# Patient Record
Sex: FEMALE | Race: WHITE | Employment: OTHER | URBAN - NONMETROPOLITAN AREA
[De-identification: names, ages, dates, MRNs, and addresses within clinical notes are randomized per-mention and may not be internally consistent; named-entity substitution may affect disease eponyms.]

---

## 2017-01-19 DIAGNOSIS — R07.89 CHEST WALL PAIN: ICD-10-CM

## 2017-01-19 RX ORDER — AMITRIPTYLINE HYDROCHLORIDE 25 MG/1
TABLET, FILM COATED ORAL
Qty: 90 TABLET | Refills: 2 | Status: SHIPPED | OUTPATIENT
Start: 2017-01-19 | End: 2017-04-25 | Stop reason: ALTCHOICE

## 2017-04-14 DIAGNOSIS — E78.00 PURE HYPERCHOLESTEROLEMIA: ICD-10-CM

## 2017-04-14 DIAGNOSIS — I10 HTN, GOAL BELOW 140/90: ICD-10-CM

## 2017-04-17 RX ORDER — VALSARTAN 320 MG/1
TABLET ORAL
Qty: 90 TABLET | Refills: 3 | Status: SHIPPED | OUTPATIENT
Start: 2017-04-17 | End: 2018-04-03 | Stop reason: SDUPTHER

## 2017-04-17 RX ORDER — SIMVASTATIN 40 MG
TABLET ORAL
Qty: 90 TABLET | Refills: 3 | OUTPATIENT
Start: 2017-04-17

## 2017-04-17 RX ORDER — SIMVASTATIN 20 MG
20 TABLET ORAL EVERY EVENING
Qty: 90 TABLET | Refills: 5 | Status: SHIPPED | OUTPATIENT
Start: 2017-04-17 | End: 2018-04-26

## 2017-04-25 ENCOUNTER — OFFICE VISIT (OUTPATIENT)
Dept: FAMILY MEDICINE CLINIC | Age: 72
End: 2017-04-25

## 2017-04-25 VITALS
TEMPERATURE: 98.3 F | RESPIRATION RATE: 14 BRPM | SYSTOLIC BLOOD PRESSURE: 120 MMHG | HEIGHT: 62 IN | DIASTOLIC BLOOD PRESSURE: 64 MMHG | WEIGHT: 198 LBS | BODY MASS INDEX: 36.44 KG/M2 | HEART RATE: 63 BPM

## 2017-04-25 DIAGNOSIS — M70.71 HIP BURSITIS, RIGHT: ICD-10-CM

## 2017-04-25 DIAGNOSIS — R73.03 PREDIABETES: ICD-10-CM

## 2017-04-25 DIAGNOSIS — E66.9 OBESITY (BMI 35.0-39.9 WITHOUT COMORBIDITY): Primary | ICD-10-CM

## 2017-04-25 DIAGNOSIS — I10 HTN, GOAL BELOW 140/90: ICD-10-CM

## 2017-04-25 DIAGNOSIS — R73.9 HYPERGLYCEMIA: ICD-10-CM

## 2017-04-25 LAB — HBA1C MFR BLD: 5.8 %

## 2017-04-25 PROCEDURE — 3017F COLORECTAL CA SCREEN DOC REV: CPT | Performed by: FAMILY MEDICINE

## 2017-04-25 PROCEDURE — 83036 HEMOGLOBIN GLYCOSYLATED A1C: CPT | Performed by: FAMILY MEDICINE

## 2017-04-25 PROCEDURE — 1036F TOBACCO NON-USER: CPT | Performed by: FAMILY MEDICINE

## 2017-04-25 PROCEDURE — G8427 DOCREV CUR MEDS BY ELIG CLIN: HCPCS | Performed by: FAMILY MEDICINE

## 2017-04-25 PROCEDURE — G8417 CALC BMI ABV UP PARAM F/U: HCPCS | Performed by: FAMILY MEDICINE

## 2017-04-25 PROCEDURE — G8399 PT W/DXA RESULTS DOCUMENT: HCPCS | Performed by: FAMILY MEDICINE

## 2017-04-25 PROCEDURE — 3014F SCREEN MAMMO DOC REV: CPT | Performed by: FAMILY MEDICINE

## 2017-04-25 PROCEDURE — 99214 OFFICE O/P EST MOD 30 MIN: CPT | Performed by: FAMILY MEDICINE

## 2017-04-25 PROCEDURE — 1123F ACP DISCUSS/DSCN MKR DOCD: CPT | Performed by: FAMILY MEDICINE

## 2017-04-25 PROCEDURE — 1090F PRES/ABSN URINE INCON ASSESS: CPT | Performed by: FAMILY MEDICINE

## 2017-04-25 PROCEDURE — 4040F PNEUMOC VAC/ADMIN/RCVD: CPT | Performed by: FAMILY MEDICINE

## 2017-04-25 RX ORDER — CHOLECALCIFEROL (VITAMIN D3) 125 MCG
5000 CAPSULE ORAL DAILY
COMMUNITY

## 2017-04-26 ENCOUNTER — TELEPHONE (OUTPATIENT)
Dept: FAMILY MEDICINE CLINIC | Age: 72
End: 2017-04-26

## 2017-04-26 DIAGNOSIS — G89.29 CHRONIC MIDLINE LOW BACK PAIN WITHOUT SCIATICA: ICD-10-CM

## 2017-04-26 DIAGNOSIS — M25.551 CHRONIC HIP PAIN, RIGHT: Primary | ICD-10-CM

## 2017-04-26 DIAGNOSIS — G89.29 CHRONIC HIP PAIN, RIGHT: Primary | ICD-10-CM

## 2017-04-26 DIAGNOSIS — M54.50 CHRONIC MIDLINE LOW BACK PAIN WITHOUT SCIATICA: ICD-10-CM

## 2017-04-27 ENCOUNTER — TELEPHONE (OUTPATIENT)
Dept: FAMILY MEDICINE CLINIC | Age: 72
End: 2017-04-27

## 2017-08-16 ENCOUNTER — NURSE TRIAGE (OUTPATIENT)
Dept: ADMINISTRATIVE | Age: 72
End: 2017-08-16

## 2017-10-22 ENCOUNTER — HOSPITAL ENCOUNTER (EMERGENCY)
Age: 72
Discharge: HOME | End: 2017-10-22
Attending: FAMILY MEDICINE
Payer: MEDICARE

## 2017-10-22 ENCOUNTER — APPOINTMENT (OUTPATIENT)
Dept: GENERAL RADIOLOGY | Age: 72
End: 2017-10-22
Payer: MEDICARE

## 2017-10-22 VITALS
HEART RATE: 68 BPM | HEIGHT: 62 IN | TEMPERATURE: 98.3 F | DIASTOLIC BLOOD PRESSURE: 57 MMHG | OXYGEN SATURATION: 96 % | RESPIRATION RATE: 18 BRPM | BODY MASS INDEX: 34.04 KG/M2 | SYSTOLIC BLOOD PRESSURE: 146 MMHG | WEIGHT: 185 LBS

## 2017-10-22 DIAGNOSIS — R07.9 CHEST PAIN, UNSPECIFIED TYPE: Primary | ICD-10-CM

## 2017-10-22 LAB
ANION GAP SERPL CALCULATED.3IONS-SCNC: 14 MEQ/L (ref 8–16)
APTT: 32.6 SECONDS (ref 22–38)
BASOPHILS # BLD: 0.7 %
BASOPHILS ABSOLUTE: 0 THOU/MM3 (ref 0–0.1)
BUN BLDV-MCNC: 13 MG/DL (ref 7–22)
CALCIUM SERPL-MCNC: 9.3 MG/DL (ref 8.5–10.5)
CHLORIDE BLD-SCNC: 101 MEQ/L (ref 98–111)
CO2: 25 MEQ/L (ref 23–33)
CREAT SERPL-MCNC: 0.4 MG/DL (ref 0.4–1.2)
D-DIMER QUANTITATIVE: 416 NG/ML FEU (ref 0–500)
EKG ATRIAL RATE: 72 BPM
EKG P AXIS: 66 DEGREES
EKG P-R INTERVAL: 206 MS
EKG Q-T INTERVAL: 410 MS
EKG QRS DURATION: 134 MS
EKG QTC CALCULATION (BAZETT): 448 MS
EKG R AXIS: 15 DEGREES
EKG T AXIS: 108 DEGREES
EKG VENTRICULAR RATE: 72 BPM
EOSINOPHIL # BLD: 3.4 %
EOSINOPHILS ABSOLUTE: 0.2 THOU/MM3 (ref 0–0.4)
GFR SERPL CREATININE-BSD FRML MDRD: > 90 ML/MIN/1.73M2
GLUCOSE BLD-MCNC: 103 MG/DL (ref 70–108)
HCT VFR BLD CALC: 41.1 % (ref 37–47)
HEMOGLOBIN: 13.8 GM/DL (ref 12–16)
INR BLD: 0.97 (ref 0.85–1.13)
LYMPHOCYTES # BLD: 35.3 %
LYMPHOCYTES ABSOLUTE: 1.8 THOU/MM3 (ref 1–4.8)
MCH RBC QN AUTO: 29.2 PG (ref 27–31)
MCHC RBC AUTO-ENTMCNC: 33.6 GM/DL (ref 33–37)
MCV RBC AUTO: 86.9 FL (ref 81–99)
MONOCYTES # BLD: 9.2 %
MONOCYTES ABSOLUTE: 0.5 THOU/MM3 (ref 0.4–1.3)
NUCLEATED RED BLOOD CELLS: 0 /100 WBC
OSMOLALITY CALCULATION: 279.8 MOSMOL/KG (ref 275–300)
PDW BLD-RTO: 14 % (ref 11.5–14.5)
PLATELET # BLD: 145 THOU/MM3 (ref 130–400)
PMV BLD AUTO: 9 MCM (ref 7.4–10.4)
POTASSIUM SERPL-SCNC: 3.6 MEQ/L (ref 3.5–5.2)
RBC # BLD: 4.73 MILL/MM3 (ref 4.2–5.4)
RBC # BLD: NORMAL 10*6/UL
SEG NEUTROPHILS: 51.4 %
SEGMENTED NEUTROPHILS ABSOLUTE COUNT: 2.6 THOU/MM3 (ref 1.8–7.7)
SODIUM BLD-SCNC: 140 MEQ/L (ref 135–145)
TROPONIN T: < 0.01 NG/ML
WBC # BLD: 5.1 THOU/MM3 (ref 4.8–10.8)

## 2017-10-22 PROCEDURE — 85610 PROTHROMBIN TIME: CPT

## 2017-10-22 PROCEDURE — 71101 X-RAY EXAM UNILAT RIBS/CHEST: CPT

## 2017-10-22 PROCEDURE — 99285 EMERGENCY DEPT VISIT HI MDM: CPT

## 2017-10-22 PROCEDURE — 85379 FIBRIN DEGRADATION QUANT: CPT

## 2017-10-22 PROCEDURE — 85025 COMPLETE CBC W/AUTO DIFF WBC: CPT

## 2017-10-22 PROCEDURE — 93005 ELECTROCARDIOGRAM TRACING: CPT

## 2017-10-22 PROCEDURE — 85730 THROMBOPLASTIN TIME PARTIAL: CPT

## 2017-10-22 PROCEDURE — 80048 BASIC METABOLIC PNL TOTAL CA: CPT

## 2017-10-22 PROCEDURE — 84484 ASSAY OF TROPONIN QUANT: CPT

## 2017-10-22 PROCEDURE — 36415 COLL VENOUS BLD VENIPUNCTURE: CPT

## 2017-10-22 PROCEDURE — 2580000003 HC RX 258: Performed by: FAMILY MEDICINE

## 2017-10-22 RX ORDER — SODIUM CHLORIDE 9 MG/ML
INJECTION, SOLUTION INTRAVENOUS CONTINUOUS
Status: DISCONTINUED | OUTPATIENT
Start: 2017-10-22 | End: 2017-10-22 | Stop reason: HOSPADM

## 2017-10-22 RX ADMIN — SODIUM CHLORIDE: 9 INJECTION, SOLUTION INTRAVENOUS at 05:21

## 2017-10-22 ASSESSMENT — PAIN SCALES - GENERAL
PAINLEVEL_OUTOF10: 5
PAINLEVEL_OUTOF10: 5

## 2017-10-22 ASSESSMENT — ENCOUNTER SYMPTOMS
COUGH: 0
WHEEZING: 0
CHEST TIGHTNESS: 0
ABDOMINAL PAIN: 0

## 2017-10-22 ASSESSMENT — PAIN DESCRIPTION - PAIN TYPE: TYPE: ACUTE PAIN

## 2017-10-22 ASSESSMENT — PAIN DESCRIPTION - DESCRIPTORS: DESCRIPTORS: ACHING

## 2017-10-22 ASSESSMENT — PAIN DESCRIPTION - LOCATION: LOCATION: CHEST

## 2017-10-22 ASSESSMENT — PAIN DESCRIPTION - FREQUENCY: FREQUENCY: CONTINUOUS

## 2017-10-22 ASSESSMENT — PAIN DESCRIPTION - ORIENTATION: ORIENTATION: LEFT

## 2017-10-22 NOTE — ED TRIAGE NOTES
Patient presents to ER room 3 with report of left sided chest pain. States that the pain has been present intermittently since 1930 last night but states the pain significantly increased after waking up for the restroom early this morning. Patient denies any shortness of breath or diaphoresis. States she was initially concerned for indigestion but now is not so sure. Respirations even and unlabored at this time. Patient states that she is a patient of Dr Cindy Carlisle and believes she had a echo performed in April.

## 2017-10-22 NOTE — ED PROVIDER NOTES
indicated that the status of her paternal uncle is unknown. She indicated that the status of her neg hx is unknown.    family history includes Cancer in her sister; Heart Disease in her father; High Blood Pressure in her paternal uncle; Other in her mother; Stroke in her maternal grandmother and mother. SOCIAL HISTORY      reports that she has never smoked. She has never used smokeless tobacco. She reports that she does not drink alcohol or use drugs. PHYSICAL EXAM     INITIAL VITALS:  height is 5' 2\" (1.575 m) and weight is 185 lb (83.9 kg). Her oral temperature is 98.3 °F (36.8 °C). Her blood pressure is 146/57 (abnormal) and her pulse is 68. Her respiration is 18 and oxygen saturation is 96%. Physical Exam   Constitutional: She is oriented to person, place, and time. She appears well-developed and well-nourished. GCS 15   HENT:   Head: Normocephalic and atraumatic. Eyes: Conjunctivae are normal. Pupils are equal, round, and reactive to light. No scleral icterus. Neck: Normal range of motion. Neck supple. No JVD present. No tracheal deviation present. No thyromegaly present. Cardiovascular: Normal rate, regular rhythm and intact distal pulses. Pulmonary/Chest: Effort normal and breath sounds normal. She has no wheezes. She exhibits no tenderness. Abdominal: Soft. Bowel sounds are normal. There is no tenderness. There is no rebound and no guarding. Musculoskeletal: Normal range of motion. She exhibits no edema or deformity. Lymphadenopathy:     She has no cervical adenopathy. Neurological: She is alert and oriented to person, place, and time. She exhibits normal muscle tone. Skin: Skin is warm and dry. No erythema. Psychiatric: She has a normal mood and affect. Her behavior is normal.   Nursing note and vitals reviewed. DIFFERENTIAL DIAGNOSIS:   Chest pain, nonexertional    Evaluate for cardiac cause, consider PE    Possible musculoskeletal    DIAGNOSTIC RESULTS     EKG:  All EKG's are interpreted by the Emergency Department Physician who either signs or Co-signs this chart in the absence of a cardiologist.  EKG interpreted by Tano Schuler MD:    EKG showed Sinus rhythm with rate 72. QRS complexes show Normal axis, 134 ms conduction. ST-T waves show Changes secondary to left bundle branch block    Compared to old EKG, no change. RADIOLOGY: non-plain film images(s) such as CT, Ultrasound and MRI are read by the radiologist.  The patient had a 5 view X-ray of the Chest and left ribs which demonstrates Normal heart and mediastinum. Lungs were clear with no pneumonia. Visualized ribs appear intact with no fracture seen per my interpretation    [x] Visualized and interpreted by me   [] Radiologist's Report Reviewed   [] Discussed with Radiologist.        LABS:   Labs Reviewed   D-DIMER, QUANTITATIVE   CBC WITH AUTO DIFFERENTIAL   PROTIME-INR   APTT   BASIC METABOLIC PANEL   TROPONIN   ANION GAP   GLOMERULAR FILTRATION RATE, ESTIMATED   OSMOLALITY       EMERGENCY DEPARTMENT COURSE:   Vitals:    Vitals:    10/22/17 0459 10/22/17 0534   BP: (!) 169/63 (!) 146/57   Pulse: 78 68   Resp: 16 18   Temp: 98.3 °F (36.8 °C)    TempSrc: Oral    SpO2: 94% 96%   Weight: 185 lb (83.9 kg)    Height: 5' 2\" (1.575 m)      5:25 AM: The patient was seen and evaluated. Nursing notes reviewed    D-dimer normal going against PE    EKG unchanged    Troponin normal    Heart cath 2/11/16 showed patent coronary arteriesSo unlikely to have progressed in the short time interval    We'll treat for chest wall pain    Discharge home         CRITICAL CARE:   none     CONSULTS:  none    PROCEDURES:  None    FINAL IMPRESSION      1.  Chest pain, unspecified type          DISPOSITION/PLAN     Discharge home    PATIENT REFERRED TO:  DO Tiffanie Gambino [id number]  [phone]    In 1 week        DISCHARGE MEDICATIONS:  New Prescriptions    No medications on file       (Please note that

## 2017-10-24 ENCOUNTER — CARE COORDINATION (OUTPATIENT)
Dept: FAMILY MEDICINE CLINIC | Age: 72
End: 2017-10-24

## 2017-10-24 NOTE — CARE COORDINATION
Ambulatory Care Coordination ED Follow up Call       Reason for ED Visit:  Chest Pain  Care Management Risk Score: CMRS 0  How are you feeling? :     improved  Patient Reports the following:  none             Contact RNCC regarding any worsening symptoms from above. Did you call your PCP prior to going to the ED? No          Post Discharge Status:  What health concerns since you left the Emergency Room?  none    Do you have wounds that you are caring for at home? No    Do you have a follow up appt scheduled? yes    Review of Instructions:                                 Do you have any questions regarding your discharge instructions?:  No  Medications:    What questions do you have about your medications? N/A  Are you taking your medications as directed? If not - why? Yes   Can you afford your medications? yes  ADLS:  Do you need assistance of any kind at home? No   What assistance is needed? N/a    I spoke with the patient re: ed visit. Patient was seen and treated for chest pain. Patients denies current chest pain or palpations. Admits to shortness of breath when getting out of bed in the mornings. No dizziness or lightheadedness. Instructed patient on importance of early symptom recognition to prevent hospitalization and ED visits. No pain. Follow up with PCP has been scheduled. I offered an appointment with CNP, but patient requested to see PCP only. I advised patient to contact PCP office if needed. No further needs at this time.             FU appts/Provider:    Future Appointments  Date Time Provider Lesley Hdz   11/7/2017 11:00 AM Prasad Ramos, [address] [place] [address]   12/4/2017 1:00 PM Cecilia Collado MD [facility]   4/26/2018 11:00 AM [facility] Due   Topic Date Due    Hepatitis C screen  1945    DTaP/Tdap/Td vaccine (1 - Tdap) 06/24/1966    Pneumococcal low/med risk (2 of 2 - PPSV23) 08/03/2016    Flu vaccine (1) 09/01/2017    Breast cancer screen  11/02/2017     Patient advised to contact PCP office to have HM items/records faxed to PCP Office directly?   yes

## 2017-11-07 ENCOUNTER — OFFICE VISIT (OUTPATIENT)
Dept: FAMILY MEDICINE CLINIC | Age: 72
End: 2017-11-07
Payer: MEDICARE

## 2017-11-07 VITALS
BODY MASS INDEX: 35.5 KG/M2 | DIASTOLIC BLOOD PRESSURE: 62 MMHG | HEART RATE: 67 BPM | WEIGHT: 188 LBS | SYSTOLIC BLOOD PRESSURE: 142 MMHG | TEMPERATURE: 98.1 F | RESPIRATION RATE: 14 BRPM | HEIGHT: 61 IN
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
## 2017-11-07 DIAGNOSIS — M25.552 LEFT HIP PAIN: Primary | ICD-10-CM

## 2017-11-07 DIAGNOSIS — Z91.81 AT HIGH RISK FOR FALLS: ICD-10-CM

## 2017-11-07 DIAGNOSIS — M70.71 BURSITIS OF RIGHT HIP, UNSPECIFIED BURSA: ICD-10-CM

## 2017-11-07 DIAGNOSIS — R07.89 CHEST WALL PAIN: ICD-10-CM

## 2017-11-07 PROCEDURE — G8484 FLU IMMUNIZE NO ADMIN: HCPCS | Performed by: FAMILY MEDICINE

## 2017-11-07 PROCEDURE — 3014F SCREEN MAMMO DOC REV: CPT | Performed by: FAMILY MEDICINE

## 2017-11-07 PROCEDURE — 4040F PNEUMOC VAC/ADMIN/RCVD: CPT | Performed by: FAMILY MEDICINE

## 2017-11-07 PROCEDURE — 1123F ACP DISCUSS/DSCN MKR DOCD: CPT | Performed by: FAMILY MEDICINE

## 2017-11-07 PROCEDURE — 1036F TOBACCO NON-USER: CPT | Performed by: FAMILY MEDICINE

## 2017-11-07 PROCEDURE — G8427 DOCREV CUR MEDS BY ELIG CLIN: HCPCS | Performed by: FAMILY MEDICINE

## 2017-11-07 PROCEDURE — 3017F COLORECTAL CA SCREEN DOC REV: CPT | Performed by: FAMILY MEDICINE

## 2017-11-07 PROCEDURE — G8417 CALC BMI ABV UP PARAM F/U: HCPCS | Performed by: FAMILY MEDICINE

## 2017-11-07 PROCEDURE — G8399 PT W/DXA RESULTS DOCUMENT: HCPCS | Performed by: FAMILY MEDICINE

## 2017-11-07 PROCEDURE — 1090F PRES/ABSN URINE INCON ASSESS: CPT | Performed by: FAMILY MEDICINE

## 2017-11-07 PROCEDURE — 99214 OFFICE O/P EST MOD 30 MIN: CPT | Performed by: FAMILY MEDICINE

## 2017-11-07 RX ORDER — OMEPRAZOLE 20 MG/1
40 CAPSULE, DELAYED RELEASE ORAL DAILY
COMMUNITY
End: 2018-05-03

## 2017-11-07 RX ORDER — PREDNISONE 20 MG/1
40 TABLET ORAL EVERY MORNING
Qty: 10 TABLET | Refills: 0 | Status: SHIPPED | OUTPATIENT
Start: 2017-11-07 | End: 2017-11-12

## 2017-11-07 ASSESSMENT — PATIENT HEALTH QUESTIONNAIRE - PHQ9
SUM OF ALL RESPONSES TO PHQ QUESTIONS 1-9: 0
1. LITTLE INTEREST OR PLEASURE IN DOING THINGS: 0
SUM OF ALL RESPONSES TO PHQ9 QUESTIONS 1 & 2: 0
2. FEELING DOWN, DEPRESSED OR HOPELESS: 0

## 2017-11-07 ASSESSMENT — ENCOUNTER SYMPTOMS
WHEEZING: 0
COUGH: 0
SHORTNESS OF BREATH: 0

## 2017-11-07 NOTE — PROGRESS NOTES
Have you changed or started any medications since your last visit including any over-the-counter medicines, vitamins, or herbal medicines? no   Are you having any side effects from any of your medications? -  no  Have you stopped taking any of your medications? Is so, why? -  no    Have you seen any other physician or provider since your last visit? Yes - Records Obtained  Have you had any other diagnostic tests since your last visit? Yes - Records Obtained  Have you been seen in the emergency room and/or had an admission to a hospital since we last saw you? Yes - Records Obtained  Have you had your routine dental cleaning in the past 6 months? no    Have you activated your [employer] account? If not, what are your barriers? Yes     Patient Care Team:  Lamar Hernández DO as PCP - [address] [place] [address] DO as PCP - [facility] Attributed Provider  Enrique Weiss CNP as Nurse Practitioner (Nurse Practitioner)  Nickie Villalpando MD as Consulting Physician (Pulmonology)    Medical History Review  Past Medical, Family, and Social History     Defer to provider.

## 2017-11-07 NOTE — LETTER
[address]  [address] [address]  Phone: [phone]  Fax: [phone]    Raymond Sims DO        November 7, 2017     Patient: Jenifer Landry   YOB: 1945   Date of Visit: 11/7/2017       To Whom It May Concern: It is my medical opinion that Kirke Pica should have a goal weight of 150lbs. If you have any questions or concerns, please don't hesitate to call.     Sincerely,        Raymond Sims DO

## 2017-11-08 ENCOUNTER — HOSPITAL ENCOUNTER (OUTPATIENT)
Dept: GENERAL RADIOLOGY | Age: 72
Discharge: HOME | End: 2017-11-08
Payer: MEDICARE

## 2017-11-08 ENCOUNTER — HOSPITAL ENCOUNTER (OUTPATIENT)
Age: 72
Discharge: HOME | End: 2017-11-08
Payer: MEDICARE

## 2017-11-08 ENCOUNTER — TELEPHONE (OUTPATIENT)
Dept: FAMILY MEDICINE CLINIC | Age: 72
End: 2017-11-08

## 2017-11-08 DIAGNOSIS — M25.552 LEFT HIP PAIN: ICD-10-CM

## 2017-11-08 PROCEDURE — 73502 X-RAY EXAM HIP UNI 2-3 VIEWS: CPT

## 2017-11-08 NOTE — TELEPHONE ENCOUNTER
----- Message from Marion Porter, DO sent at 11/8/2017 12:02 PM EST -----  XRay with arthritis changes, but otherwise looked ok.   I would like to see how she does on the prednisone

## 2017-11-09 ENCOUNTER — HOSPITAL ENCOUNTER (OUTPATIENT)
Dept: CT IMAGING | Age: 72
Discharge: HOME | End: 2017-11-09
Payer: MEDICARE

## 2017-11-09 DIAGNOSIS — R10.84 GENERALIZED ABDOMINAL PAIN: ICD-10-CM

## 2017-11-09 PROCEDURE — 74177 CT ABD & PELVIS W/CONTRAST: CPT

## 2017-11-09 PROCEDURE — 6360000004 HC RX CONTRAST MEDICATION: Performed by: NURSE PRACTITIONER

## 2017-11-09 RX ADMIN — IOHEXOL 50 ML: 240 INJECTION, SOLUTION INTRATHECAL; INTRAVASCULAR; INTRAVENOUS; ORAL at 13:30

## 2017-11-09 RX ADMIN — IOPAMIDOL 85 ML: 755 INJECTION, SOLUTION INTRAVENOUS at 13:30

## 2017-11-13 ENCOUNTER — TELEPHONE (OUTPATIENT)
Dept: FAMILY MEDICINE CLINIC | Age: 72
End: 2017-11-13

## 2017-11-13 DIAGNOSIS — E27.8 LEFT ADRENAL MASS (HCC): Primary | ICD-10-CM

## 2017-11-29 ENCOUNTER — HOSPITAL ENCOUNTER (OUTPATIENT)
Dept: MRI IMAGING | Age: 72
Discharge: HOME | End: 2017-11-29
Payer: MEDICARE

## 2017-11-29 ENCOUNTER — TELEPHONE (OUTPATIENT)
Dept: FAMILY MEDICINE CLINIC | Age: 72
End: 2017-11-29

## 2017-11-29 DIAGNOSIS — E27.8 LEFT ADRENAL MASS (HCC): ICD-10-CM

## 2017-11-29 PROCEDURE — 74181 MRI ABDOMEN W/O CONTRAST: CPT

## 2017-12-04 ENCOUNTER — OFFICE VISIT (OUTPATIENT)
Dept: SURGERY | Age: 72
End: 2017-12-04
Payer: MEDICARE

## 2017-12-04 VITALS
WEIGHT: 192.4 LBS | OXYGEN SATURATION: 93 % | TEMPERATURE: 96.8 F | RESPIRATION RATE: 16 BRPM | HEIGHT: 62 IN | DIASTOLIC BLOOD PRESSURE: 80 MMHG | SYSTOLIC BLOOD PRESSURE: 120 MMHG | BODY MASS INDEX: 35.41 KG/M2 | HEART RATE: 90 BPM

## 2017-12-04 DIAGNOSIS — Z12.39 SCREENING BREAST EXAMINATION: Primary | ICD-10-CM

## 2017-12-04 DIAGNOSIS — D05.12 DUCTAL CARCINOMA IN SITU (DCIS) OF LEFT BREAST: ICD-10-CM

## 2017-12-04 PROCEDURE — G8417 CALC BMI ABV UP PARAM F/U: HCPCS | Performed by: SURGERY

## 2017-12-04 PROCEDURE — G8484 FLU IMMUNIZE NO ADMIN: HCPCS | Performed by: SURGERY

## 2017-12-04 PROCEDURE — G8427 DOCREV CUR MEDS BY ELIG CLIN: HCPCS | Performed by: SURGERY

## 2017-12-04 PROCEDURE — 1123F ACP DISCUSS/DSCN MKR DOCD: CPT | Performed by: SURGERY

## 2017-12-04 PROCEDURE — G8399 PT W/DXA RESULTS DOCUMENT: HCPCS | Performed by: SURGERY

## 2017-12-04 PROCEDURE — 1036F TOBACCO NON-USER: CPT | Performed by: SURGERY

## 2017-12-04 PROCEDURE — 1090F PRES/ABSN URINE INCON ASSESS: CPT | Performed by: SURGERY

## 2017-12-04 PROCEDURE — 3014F SCREEN MAMMO DOC REV: CPT | Performed by: SURGERY

## 2017-12-04 PROCEDURE — 3017F COLORECTAL CA SCREEN DOC REV: CPT | Performed by: SURGERY

## 2017-12-04 PROCEDURE — 99213 OFFICE O/P EST LOW 20 MIN: CPT | Performed by: SURGERY

## 2017-12-04 PROCEDURE — 4040F PNEUMOC VAC/ADMIN/RCVD: CPT | Performed by: SURGERY

## 2017-12-04 NOTE — PROGRESS NOTES
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 Radha Delarosa Left 12/18/14    Left Simple Mastectomy with [place] Lymph Node Biopsy - Dr. Lamb [facility]  46/28/0621    incarcertated herina repair    TUBAL LIGATION      UPPER GASTROINTESTINAL ENDOSCOPY      URETHRA SURGERY      stretched       Medications  Current Outpatient Prescriptions   Medication Sig Dispense Refill    omeprazole (PRILOSEC) 20 MG delayed release capsule Take 40 mg by mouth daily      vitamin B-12 (CYANOCOBALAMIN) 500 MCG tablet Take 5,000 mcg by mouth daily       valsartan (DIOVAN) 320 MG tablet TAKE 1 TABLET DAILY 90 tablet 3    simvastatin (ZOCOR) 20 MG tablet Take 1 tablet by mouth every evening 90 tablet 5    famotidine (PEPCID) 20 MG tablet Take 20 mg by mouth 2 times daily      Cholecalciferol (VITAMIN D3) 5000 UNITS TABS Take 1 tablet by mouth      oxybutynin (DITROPAN) 5 MG tablet Take 1 tablet by mouth daily 90 tablet 3    Coenzyme Q10 (CO Q 10) 100 MG CAPS Take 200 mg by mouth daily      amLODIPine (NORVASC) 5 MG tablet Take 5 mg by mouth daily      metoprolol (TOPROL-XL) 25 MG XL tablet Take 25 mg by mouth daily      Mag Oxide-Vit D3-Turmeric (MAGNESIUM-VITAMIN D3-TURMERIC) [phone] MG-UNIT-MG CAPS Take 1 tablet by mouth daily      Lactobacillus (ULTIMATE PROBIOTIC FORMULA PO) Take by mouth daily      aspirin 81 MG tablet Take 81 mg by mouth daily      Multiple Vitamins-Minerals (MULTIVITAMIN PO) Take by mouth daily       Flaxseed, Linseed, (FLAXSEED OIL PO) Take by mouth daily       TURMERIC PO Take 500 mg by mouth 2 times daily       OLIVE LEAF EXTRACT PO Take 450 mg by mouth daily       GLUCOSAMINE-CHONDROITIN PO Take by mouth daily       Calcium Citrate-Vitamin D (CALCIUM CITRATE + D PO) Take  by mouth daily. No current facility-administered medications for this visit. Allergies  Allergies   Allergen Reactions    Acrylic Polymer [Carbomer] Hives    Coal Tar Extract Itching    Gabapentin      \"mouth thick and swollen\"    Lipitor [Atorvastatin] Other (See Comments)     Myalgia      Other Other (See Comments)     novacaine - headaches    Tape [Adhesive Tape] Rash       Family History  Family History   Problem Relation Age of Onset    Stroke Mother     Other Mother      dementia    Heart Disease Father     Cancer Sister      skin    High Blood Pressure Paternal Uncle     Stroke Maternal Grandmother     Diabetes Neg Hx        Social History  Social History     Social History    Marital status:      Spouse name: N/A    Number of children: 3    Years of education: N/A     Occupational History    Not on file. Social History Main Topics    Smoking status: Never Smoker    Smokeless tobacco: Never Used    Alcohol use No    Drug use: No    Sexual activity: Not on file     Other Topics Concern    Not on file     Social History Narrative    No narrative on file           Review of Systems  Review of Systems   Constitutional: Negative for activity change, appetite change, fatigue and unexpected weight change. HENT: Positive for sinus pressure. Negative for sore throat and trouble swallowing. Eyes: Positive for itching. Negative for pain. Respiratory: Positive for cough, chest tightness and shortness of breath. Gastrointestinal: Negative for abdominal distention, abdominal pain, blood in stool, constipation and nausea. Endocrine: Negative for polydipsia and polyuria.    Genitourinary: Negative for #1 and #2 - a single lymph node bisected, #3, 4,  and 5 - tissue representing a single lymph node trisected.  ns    B - The container is labeled Marge Lee, left breast.  Received  fresh is a simple mastectomy specimen. The specimen measures 20 x 18 x  5.5 cm.   The specimen surface is tan.  There are no discrete lesions. The nipple is grossly unremarkable. Carl Sarpy is a suture designating  lateral border.  The deep margin is inked blue.  Sections through the  specimen reveal a mainly yellow fatty cut surface.  In the upper outer  quadrant is a well circumscribed nodule with central hemorrhage.  This  nodule measures grossly 1.1 x 0.7 x 1 cm.  The nodule is 1.2 cm from  the deep margin.  There is a metallic biopsy clip within the nodule. In the lower outer quadrant is a second biopsy site measuring 1.6 x 0.7  x 1.2 cm.  This area is about 2 cm from the deep margin and 2.5 cm from  the upper outer quadrant lesion.  No additional biopsy sites or lesions  are grossly identified.  Representative sections are submitted. Cassette #1 - nipple, #2 - deep margin of the upper outer quadrant  lesion, #3 and #4 - upper outer quadrant lesion, #5 - deep margin of  the lower outer quadrant biopsy site, #6 and #7 - lower outer quadrant  biopsy site, #8 - tissue between the upper outer lesion and lower outer  lesion, #9 - upper inner quadrant, #10 - lower inner quadrant. Ss  AMG/DKR:cac    Fixative:  10% neutral buffered formalin  Tissue removal time: 1604  Tissue fixation time: 1644  Total fixation time: 27 hours and 16 minutes    Microscopic Examination:  A.  Multiple levels of the sentinel lymph nodes (2) are examined.  No  malignancy is identified.     B.   Procedure  Total mastectomy (including nipple and skin)    Lymph Node Sampling  Collyer lymph node(s)    Specimen Laterality  Left    Tumor Site  Upper outer quadrant  Lower outer quadrant    Size (Extent) of DCIS  Site #1 - Upper Outer Quadrant:  Estimated size (choose a category based on lymph nodes  received with the specimen; immunohistochemistry and/or molecular  studies are not required)    Modifier  (sn) Only sentinel node(s) evaluated.  If 6 or more sentinel nodes  and/or nonsentinel nodes are removed, this modifier should not be used. Category (pN)  pN0: No regional lymph node metastasis identified histologically      Distant Metastasis (M)  Not applicable    Additional Pathologic Findings  Specify:  Previous biopsy sites are identified. Ancillary Studies  Performed at [place] [address] ([id number], 11/21/14):   Estrogen Receptor:  Positive (98%)   Progesterone Receptor:  Negative   HER-2/shakira:  Positive (score 3+)   Ki-67:  Borderline (11%)    Comment: This patient's previous biopsy (14-IR-11 from [place] [address]) demonstrated three areas in the left breast noted by a U clip  and a bowtie clip.  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 *This test was developed and its performance characteristics determined  by [address] [place] [address] has not been cleared or  approved by the U.S. Food and Drug Administration. Pursuant to the  requirements of CLIA, this laboratory has established and verified the  test's accuracy and precision.  Additional information about this type  of test is available upon request.      EKM:jcs  [id number]  [id number]  [id number]                                                  <Sign Out Dr. Gilbert Vora M.D., F.C.A.P.       [facility]/ [address] [place] [place] [address]  Printed on:  12/23/2014  [address] [place] [address], \[facility] [place] [facility]\""  Original print date: 12/23/2014   Lab and Collection

## 2017-12-05 ASSESSMENT — ENCOUNTER SYMPTOMS
SORE THROAT: 0
CHEST TIGHTNESS: 1
TROUBLE SWALLOWING: 0
CONSTIPATION: 0
NAUSEA: 0
BLOOD IN STOOL: 0
EYE PAIN: 0
COUGH: 1
ABDOMINAL PAIN: 0
ABDOMINAL DISTENTION: 0
SINUS PRESSURE: 1
SHORTNESS OF BREATH: 1
EYE ITCHING: 1

## 2017-12-07 ENCOUNTER — HOSPITAL ENCOUNTER (OUTPATIENT)
Dept: WOMENS IMAGING | Age: 72
Discharge: HOME | End: 2017-12-07
Payer: MEDICARE

## 2017-12-07 DIAGNOSIS — Z12.39 SCREENING BREAST EXAMINATION: ICD-10-CM

## 2017-12-07 DIAGNOSIS — Z00.6 ENCOUNTER FOR EXAMINATION FOR NORMAL COMPARISON OR CONTROL IN CLINICAL RESEARCH PROGRAM: ICD-10-CM

## 2017-12-07 PROCEDURE — 77063 BREAST TOMOSYNTHESIS BI: CPT

## 2017-12-07 PROCEDURE — 3209999900 MAM COMPARISON OF OUTSIDE IMAGES

## 2017-12-09 ENCOUNTER — HOSPITAL ENCOUNTER (OUTPATIENT)
Dept: MRI IMAGING | Age: 72
Discharge: HOME | End: 2017-12-09
Payer: MEDICARE

## 2017-12-09 DIAGNOSIS — Z00.6 EXAMINATION FOR NORMAL COMPARISON FOR CLINICAL RESEARCH: ICD-10-CM

## 2017-12-09 PROCEDURE — 3209999900 MRI COMPARISON OF OUTSIDE FILMS

## 2018-04-03 DIAGNOSIS — I10 HTN, GOAL BELOW 140/90: ICD-10-CM

## 2018-04-03 RX ORDER — VALSARTAN 320 MG/1
TABLET ORAL
Qty: 90 TABLET | Refills: 3 | Status: SHIPPED | OUTPATIENT
Start: 2018-04-03 | End: 2018-07-23

## 2018-04-24 ENCOUNTER — HOSPITAL ENCOUNTER (OUTPATIENT)
Age: 73
Discharge: HOME | End: 2018-04-24
Payer: MEDICARE

## 2018-04-24 ENCOUNTER — HOSPITAL ENCOUNTER (OUTPATIENT)
Dept: PULMONOLOGY | Age: 73
Discharge: HOME | End: 2018-04-24
Payer: MEDICARE

## 2018-04-24 ENCOUNTER — HOSPITAL ENCOUNTER (OUTPATIENT)
Dept: GENERAL RADIOLOGY | Age: 73
Discharge: HOME | End: 2018-04-24
Payer: MEDICARE

## 2018-04-24 DIAGNOSIS — R06.02 SOB (SHORTNESS OF BREATH): ICD-10-CM

## 2018-04-24 PROCEDURE — 94726 PLETHYSMOGRAPHY LUNG VOLUMES: CPT

## 2018-04-24 PROCEDURE — 71046 X-RAY EXAM CHEST 2 VIEWS: CPT

## 2018-04-24 PROCEDURE — 94060 EVALUATION OF WHEEZING: CPT

## 2018-04-24 PROCEDURE — 94729 DIFFUSING CAPACITY: CPT

## 2018-04-26 ENCOUNTER — OFFICE VISIT (OUTPATIENT)
Dept: FAMILY MEDICINE CLINIC | Age: 73
End: 2018-04-26
Payer: MEDICARE

## 2018-04-26 DIAGNOSIS — I25.10 CORONARY ARTERY DISEASE INVOLVING NATIVE CORONARY ARTERY OF NATIVE HEART WITHOUT ANGINA PECTORIS: ICD-10-CM

## 2018-04-26 DIAGNOSIS — I10 HTN, GOAL BELOW 140/90: ICD-10-CM

## 2018-04-26 DIAGNOSIS — E78.00 PURE HYPERCHOLESTEROLEMIA: Primary | ICD-10-CM

## 2018-04-26 DIAGNOSIS — R39.15 URGENCY OF URINATION: ICD-10-CM

## 2018-04-26 PROCEDURE — 4040F PNEUMOC VAC/ADMIN/RCVD: CPT | Performed by: FAMILY MEDICINE

## 2018-04-26 PROCEDURE — 1123F ACP DISCUSS/DSCN MKR DOCD: CPT | Performed by: FAMILY MEDICINE

## 2018-04-26 PROCEDURE — 1036F TOBACCO NON-USER: CPT | Performed by: FAMILY MEDICINE

## 2018-04-26 PROCEDURE — G8427 DOCREV CUR MEDS BY ELIG CLIN: HCPCS | Performed by: FAMILY MEDICINE

## 2018-04-26 PROCEDURE — 99214 OFFICE O/P EST MOD 30 MIN: CPT | Performed by: FAMILY MEDICINE

## 2018-04-26 PROCEDURE — 3017F COLORECTAL CA SCREEN DOC REV: CPT | Performed by: FAMILY MEDICINE

## 2018-04-26 PROCEDURE — G8399 PT W/DXA RESULTS DOCUMENT: HCPCS | Performed by: FAMILY MEDICINE

## 2018-04-26 PROCEDURE — 1090F PRES/ABSN URINE INCON ASSESS: CPT | Performed by: FAMILY MEDICINE

## 2018-04-26 PROCEDURE — G8417 CALC BMI ABV UP PARAM F/U: HCPCS | Performed by: FAMILY MEDICINE

## 2018-04-26 PROCEDURE — G8598 ASA/ANTIPLAT THER USED: HCPCS | Performed by: FAMILY MEDICINE

## 2018-04-26 RX ORDER — ROSUVASTATIN CALCIUM 10 MG/1
10 TABLET, COATED ORAL NIGHTLY
Qty: 30 TABLET | Refills: 3 | COMMUNITY
Start: 2018-04-26 | End: 2018-07-20 | Stop reason: SINTOL

## 2018-04-26 RX ORDER — METOPROLOL SUCCINATE 25 MG/1
25 TABLET, EXTENDED RELEASE ORAL DAILY
Qty: 90 TABLET | Refills: 3 | Status: SHIPPED | OUTPATIENT
Start: 2018-04-26 | End: 2019-06-12 | Stop reason: SDUPTHER

## 2018-05-03 DIAGNOSIS — R39.15 URGENCY OF URINATION: ICD-10-CM

## 2018-05-03 RX ORDER — OXYBUTYNIN CHLORIDE 10 MG/1
10 TABLET, EXTENDED RELEASE ORAL DAILY
COMMUNITY
End: 2021-06-30

## 2018-07-20 ENCOUNTER — NURSE ONLY (OUTPATIENT)
Dept: LAB | Age: 73
End: 2018-07-20

## 2018-07-20 ENCOUNTER — OFFICE VISIT (OUTPATIENT)
Dept: FAMILY MEDICINE CLINIC | Age: 73
End: 2018-07-20
Payer: MEDICARE

## 2018-07-20 VITALS
HEIGHT: 63 IN | TEMPERATURE: 98.3 F | RESPIRATION RATE: 16 BRPM | WEIGHT: 206.2 LBS | BODY MASS INDEX: 36.54 KG/M2 | HEART RATE: 77 BPM | DIASTOLIC BLOOD PRESSURE: 74 MMHG | SYSTOLIC BLOOD PRESSURE: 126 MMHG

## 2018-07-20 DIAGNOSIS — M25.50 POLYARTHRALGIA: Primary | ICD-10-CM

## 2018-07-20 DIAGNOSIS — R68.89 MULTIPLE COMPLAINTS: ICD-10-CM

## 2018-07-20 DIAGNOSIS — M79.10 MYALGIA: ICD-10-CM

## 2018-07-20 DIAGNOSIS — M25.50 POLYARTHRALGIA: ICD-10-CM

## 2018-07-20 LAB
ALBUMIN SERPL-MCNC: 4.3 G/DL (ref 3.5–5.1)
ALP BLD-CCNC: 81 U/L (ref 38–126)
ALT SERPL-CCNC: 26 U/L (ref 11–66)
ANION GAP SERPL CALCULATED.3IONS-SCNC: 12 MEQ/L (ref 8–16)
AST SERPL-CCNC: 32 U/L (ref 5–40)
BILIRUB SERPL-MCNC: 0.7 MG/DL (ref 0.3–1.2)
BUN BLDV-MCNC: 12 MG/DL (ref 7–22)
CALCIUM SERPL-MCNC: 10 MG/DL (ref 8.5–10.5)
CHLORIDE BLD-SCNC: 104 MEQ/L (ref 98–111)
CO2: 26 MEQ/L (ref 23–33)
CREAT SERPL-MCNC: 0.6 MG/DL (ref 0.4–1.2)
GFR SERPL CREATININE-BSD FRML MDRD: > 90 ML/MIN/1.73M2
GLUCOSE BLD-MCNC: 110 MG/DL (ref 70–108)
POTASSIUM SERPL-SCNC: 4.1 MEQ/L (ref 3.5–5.2)
SODIUM BLD-SCNC: 142 MEQ/L (ref 135–145)
TOTAL CK: 76 U/L (ref 30–135)
TOTAL PROTEIN: 7.8 G/DL (ref 6.1–8)

## 2018-07-20 PROCEDURE — 1036F TOBACCO NON-USER: CPT | Performed by: NURSE PRACTITIONER

## 2018-07-20 PROCEDURE — G8427 DOCREV CUR MEDS BY ELIG CLIN: HCPCS | Performed by: NURSE PRACTITIONER

## 2018-07-20 PROCEDURE — 1090F PRES/ABSN URINE INCON ASSESS: CPT | Performed by: NURSE PRACTITIONER

## 2018-07-20 PROCEDURE — 4040F PNEUMOC VAC/ADMIN/RCVD: CPT | Performed by: NURSE PRACTITIONER

## 2018-07-20 PROCEDURE — G8598 ASA/ANTIPLAT THER USED: HCPCS | Performed by: NURSE PRACTITIONER

## 2018-07-20 PROCEDURE — G8417 CALC BMI ABV UP PARAM F/U: HCPCS | Performed by: NURSE PRACTITIONER

## 2018-07-20 PROCEDURE — 3017F COLORECTAL CA SCREEN DOC REV: CPT | Performed by: NURSE PRACTITIONER

## 2018-07-20 PROCEDURE — 1123F ACP DISCUSS/DSCN MKR DOCD: CPT | Performed by: NURSE PRACTITIONER

## 2018-07-20 PROCEDURE — 99214 OFFICE O/P EST MOD 30 MIN: CPT | Performed by: NURSE PRACTITIONER

## 2018-07-20 PROCEDURE — G8399 PT W/DXA RESULTS DOCUMENT: HCPCS | Performed by: NURSE PRACTITIONER

## 2018-07-20 PROCEDURE — 1101F PT FALLS ASSESS-DOCD LE1/YR: CPT | Performed by: NURSE PRACTITIONER

## 2018-07-20 NOTE — PROGRESS NOTES
Chito Barillas is a 72 y.o. female that presents for Lower Back Pain (right hip. right ankle pain started 2 weeks ago no mech of injury, pt thought it could be her cholesterol med) and Headache (started last week dull, blurred vision in th am gradually gets better as the day goes on)        HPI:    Charis Akins has been having bilateral leg pains and muscle cramps. She does also have lower back pain, a lot of gas. When she goes to have a BM the pain in her back gets better. She also had some left upper arm pain. Headaches in the mornings and sometimes throughout the day. Also felt lightheaded. Vision is blurred in the mornings, usually later in the day it goes away. She has been to the eye doctor and she discussed this with them. Has been on the Crestor for about 3 months now, wonders if most of these complaints are due to the Crestor. She has been on Simvastatin in the past, and she tolerated it well. Dr. Bailey Kawasaki wanted it changed to the Crestor. He also doubled her BP medications. She is concerned that with the pain in her hip being relieved when she has a BM, concerned about colon cancer. She reports that she is having normal BM. She does take probiotics and this does help her bowels move regular, and the BM are soft. HTN    Does patient check BP regularly at home? - Yes - 175 systolic at the highest, usually in the 120's  Current Medication regimen - Diovan, Metoprolol  Tolerating medications well? - yes    Shortness of breath or chest pain? Yes - some episodes of SOB at night when she tries to lie down, she'll go lay down in the recliner and get some phlegm out of her throat and she'll feel better. Headache or visual complaints? Yes - some headaches and visual complaints  Neurologic changes like confusion? No  Extremity edema?  No    BP Readings from Last 3 Encounters:   07/20/18 126/74   12/04/17 120/80   11/07/17 (!) 142/62         I have reviewed the patient's past medical history, past surgical

## 2018-07-23 ENCOUNTER — TELEPHONE (OUTPATIENT)
Dept: FAMILY MEDICINE CLINIC | Age: 73
End: 2018-07-23

## 2018-07-23 ENCOUNTER — NURSE ONLY (OUTPATIENT)
Dept: LAB | Age: 73
End: 2018-07-23

## 2018-07-23 DIAGNOSIS — I10 HTN, GOAL BELOW 140/90: Primary | ICD-10-CM

## 2018-07-23 LAB
ALBUMIN SERPL-MCNC: 4.4 G/DL (ref 3.5–5.1)
ALP BLD-CCNC: 88 U/L (ref 38–126)
ALT SERPL-CCNC: 22 U/L (ref 11–66)
ANION GAP SERPL CALCULATED.3IONS-SCNC: 13 MEQ/L (ref 8–16)
AST SERPL-CCNC: 24 U/L (ref 5–40)
BILIRUB SERPL-MCNC: 0.4 MG/DL (ref 0.3–1.2)
BILIRUBIN DIRECT: < 0.2 MG/DL (ref 0–0.3)
BUN BLDV-MCNC: 11 MG/DL (ref 7–22)
CALCIUM SERPL-MCNC: 9.5 MG/DL (ref 8.5–10.5)
CHLORIDE BLD-SCNC: 102 MEQ/L (ref 98–111)
CHOLESTEROL, TOTAL: 197 MG/DL (ref 100–199)
CO2: 27 MEQ/L (ref 23–33)
CREAT SERPL-MCNC: 0.6 MG/DL (ref 0.4–1.2)
GFR SERPL CREATININE-BSD FRML MDRD: > 90 ML/MIN/1.73M2
GLUCOSE BLD-MCNC: 99 MG/DL (ref 70–108)
HDLC SERPL-MCNC: 77 MG/DL
LDL CHOLESTEROL CALCULATED: 94 MG/DL
POTASSIUM SERPL-SCNC: 4.3 MEQ/L (ref 3.5–5.2)
SODIUM BLD-SCNC: 142 MEQ/L (ref 135–145)
TOTAL PROTEIN: 7.2 G/DL (ref 6.1–8)
TRIGL SERPL-MCNC: 128 MG/DL (ref 0–199)

## 2018-07-23 RX ORDER — LOSARTAN POTASSIUM 100 MG/1
100 TABLET ORAL DAILY
Qty: 90 TABLET | Refills: 3 | Status: SHIPPED | OUTPATIENT
Start: 2018-07-23 | End: 2018-10-10 | Stop reason: SDUPTHER

## 2018-08-02 ENCOUNTER — OFFICE VISIT (OUTPATIENT)
Dept: FAMILY MEDICINE CLINIC | Age: 73
End: 2018-08-02
Payer: MEDICARE

## 2018-08-02 VITALS
SYSTOLIC BLOOD PRESSURE: 138 MMHG | TEMPERATURE: 98.1 F | BODY MASS INDEX: 36.86 KG/M2 | RESPIRATION RATE: 16 BRPM | WEIGHT: 208 LBS | HEIGHT: 63 IN | HEART RATE: 83 BPM | DIASTOLIC BLOOD PRESSURE: 86 MMHG

## 2018-08-02 DIAGNOSIS — I25.10 CORONARY ARTERY DISEASE INVOLVING NATIVE CORONARY ARTERY OF NATIVE HEART WITHOUT ANGINA PECTORIS: Primary | ICD-10-CM

## 2018-08-02 DIAGNOSIS — E78.00 PURE HYPERCHOLESTEROLEMIA: ICD-10-CM

## 2018-08-02 PROCEDURE — 3017F COLORECTAL CA SCREEN DOC REV: CPT | Performed by: FAMILY MEDICINE

## 2018-08-02 PROCEDURE — 1036F TOBACCO NON-USER: CPT | Performed by: FAMILY MEDICINE

## 2018-08-02 PROCEDURE — 1123F ACP DISCUSS/DSCN MKR DOCD: CPT | Performed by: FAMILY MEDICINE

## 2018-08-02 PROCEDURE — 99213 OFFICE O/P EST LOW 20 MIN: CPT | Performed by: FAMILY MEDICINE

## 2018-08-02 PROCEDURE — G8417 CALC BMI ABV UP PARAM F/U: HCPCS | Performed by: FAMILY MEDICINE

## 2018-08-02 PROCEDURE — G8598 ASA/ANTIPLAT THER USED: HCPCS | Performed by: FAMILY MEDICINE

## 2018-08-02 PROCEDURE — 4040F PNEUMOC VAC/ADMIN/RCVD: CPT | Performed by: FAMILY MEDICINE

## 2018-08-02 PROCEDURE — G8399 PT W/DXA RESULTS DOCUMENT: HCPCS | Performed by: FAMILY MEDICINE

## 2018-08-02 PROCEDURE — 1101F PT FALLS ASSESS-DOCD LE1/YR: CPT | Performed by: FAMILY MEDICINE

## 2018-08-02 PROCEDURE — 1090F PRES/ABSN URINE INCON ASSESS: CPT | Performed by: FAMILY MEDICINE

## 2018-08-02 PROCEDURE — G8427 DOCREV CUR MEDS BY ELIG CLIN: HCPCS | Performed by: FAMILY MEDICINE

## 2018-08-02 RX ORDER — SIMVASTATIN 10 MG
10 TABLET ORAL EVERY EVENING
Qty: 90 TABLET | Refills: 3 | Status: ON HOLD | OUTPATIENT
Start: 2018-08-02 | End: 2019-01-17

## 2018-08-02 NOTE — PROGRESS NOTES
Nanci Bai is a 72 y.o. female that presents for Follow-up (Pt presents for a 2 week f/u for myaligias/ joint pain. Pt states she is doing much better. )        HPI:    Lety was seen 2 weeks ago with diffuse muscle aching and headaches. She felt like this was due to crestor. This was stopped and she is noting that her symptoms are improving. Her headache has resolved at this standpoint. States that her legs are doing better, but still not all the way resolved. She does have a history of mild CAD. She was previously on simvastatin and states that she tolerate it well.       Health Maintenance   Topic Date Due    Hepatitis C screen  1945    DTaP/Tdap/Td vaccine (1 - Tdap) 06/24/1966    Shingles Vaccine (1 of 2 - 2 Dose Series) 06/24/1997    A1C test (Diabetic or Prediabetic)  04/25/2018    Flu vaccine (1) 09/01/2018    Breast cancer screen  12/07/2018    Potassium monitoring  07/23/2019    Creatinine monitoring  07/23/2019    Lipid screen  07/23/2023    Colon cancer screen colonoscopy  10/07/2024    DEXA (modify frequency per FRAX score)  Completed    Pneumococcal low/med risk  Completed       Past Medical History:   Diagnosis Date    Acid reflux     Arthritis     Back pain     Cancer ([address]     breast    Constipation     Hyperlipidemia     Hypertension     Kidney cysts     Lung mass 2013    left 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 Judy Givens Left 12/18/14    Left Simple Mastectomy with [place] Lymph Node Biopsy - Dr. Anthony Lua  87/99/3415    incarcertated herina repair    TUBAL LIGATION      UPPER GASTROINTESTINAL ENDOSCOPY      URETHRA SURGERY      stretched       Social History   Substance Use Topics    Smoking status: Never Smoker    Smokeless tobacco: Never Used    Alcohol use No       Family History   Problem Relation Age of Onset    Stroke Mother     Other Mother         dementia    Heart Disease Father     Cancer Sister         skin    High Blood Pressure Paternal Uncle     Stroke Maternal Grandmother     Diabetes Neg Hx          I have reviewed the patient's past medical history, past surgical history, allergies, medications, social and family history and I have made updates where appropriate. ROS        PHYSICAL EXAM:  /86   Pulse 83   Temp 98.1 °F (36.7 °C)   Resp 16   Ht 5' 2.5\" (1.588 m)   Wt 208 lb (94.3 kg)   BMI 37.44 kg/m²     General Appearance: well developed and well- nourished, in no acute distress  Head: normocephalic and atraumatic  ENT: external ear and ear canal normal bilaterally, nose without deformity, nasal mucosa and turbinates normal without polyps, oropharynx normal, dentition is normal for age, no lip or gum lesions noted  Neck: supple and non-tender without mass, no thyromegaly or thyroid nodules, no cervical lymphadenopathy  Pulmonary/Chest: clear to auscultation bilaterally- no wheezes, rales or rhonchi, normal air movement, no respiratory distress or retractions  Cardiovascular: normal rate, regular rhythm, normal S1 and S2, no murmurs, rubs, clicks, or gallops, distal pulses intact  Extremities: no cyanosis, clubbing or edema of the lower extremities  Psych:  Normal affect without evidence of depression or anxiety, insight and judgement are appropriate, memory appears intact  Skin: warm and dry, no rash or erythema      ASSESSMENT & PLAN  Jose Samuel was seen today for follow-up. Diagnoses and all orders for this visit:    Coronary artery disease involving native coronary artery of native heart without angina pectoris  -     simvastatin (ZOCOR) 10 MG tablet;  Take 1 tablet by mouth every evening    Pure hypercholesterolemia    -Will start simvastatin which she tolerated previously

## 2018-08-02 NOTE — PROGRESS NOTES
Visit Information    Have you changed or started any medications since your last visit including any over-the-counter medicines, vitamins, or herbal medicines? no   Are you having any side effects from any of your medications? -  no  Have you stopped taking any of your medications? Is so, why? -  yes - see med list    Have you seen any other physician or provider since your last visit? Yes - Records Obtained  Have you had any other diagnostic tests since your last visit? Yes - Records Obtained  Have you been seen in the emergency room and/or had an admission to a hospital since we last saw you? No  Have you had your routine dental cleaning in the past 6 months? yes - routine    Have you activated your [employer] account? If not, what are your barriers?  Yes     Patient Care Team:  Faibo Quiñonez DO as PCP - General  Fabio Quiñonez DO as PCP - S Attributed Provider  TESS Saucedo - CNP as Nurse Practitioner (Nurse Practitioner)  Zabrina De La Rosa MD as Consulting Physician (Pulmonology)    Medical History Review  Past Medical, Family, and Social History reviewed and does contribute to the patient presenting condition    Health Maintenance   Topic Date Due    Hepatitis C screen  1945    DTaP/Tdap/Td vaccine (1 - Tdap) 06/24/1966    Shingles Vaccine (1 of 2 - 2 Dose Series) 06/24/1997    A1C test (Diabetic or Prediabetic)  04/25/2018    Flu vaccine (1) 09/01/2018    Breast cancer screen  12/07/2018    Potassium monitoring  07/23/2019    Creatinine monitoring  07/23/2019    Lipid screen  07/23/2023    Colon cancer screen colonoscopy  10/07/2024    DEXA (modify frequency per FRAX score)  Completed    Pneumococcal low/med risk  Completed

## 2018-09-27 ENCOUNTER — OFFICE VISIT (OUTPATIENT)
Dept: FAMILY MEDICINE CLINIC | Age: 73
End: 2018-09-27
Payer: MEDICARE

## 2018-09-27 VITALS
TEMPERATURE: 97.6 F | RESPIRATION RATE: 16 BRPM | HEIGHT: 62 IN | HEART RATE: 68 BPM | SYSTOLIC BLOOD PRESSURE: 142 MMHG | DIASTOLIC BLOOD PRESSURE: 70 MMHG | WEIGHT: 213 LBS | BODY MASS INDEX: 39.2 KG/M2

## 2018-09-27 DIAGNOSIS — R51.9 RECURRENT OCCIPITAL HEADACHE: Primary | ICD-10-CM

## 2018-09-27 DIAGNOSIS — Z23 NEED FOR INFLUENZA VACCINATION: ICD-10-CM

## 2018-09-27 PROCEDURE — 1036F TOBACCO NON-USER: CPT | Performed by: FAMILY MEDICINE

## 2018-09-27 PROCEDURE — G8598 ASA/ANTIPLAT THER USED: HCPCS | Performed by: FAMILY MEDICINE

## 2018-09-27 PROCEDURE — G8399 PT W/DXA RESULTS DOCUMENT: HCPCS | Performed by: FAMILY MEDICINE

## 2018-09-27 PROCEDURE — G8427 DOCREV CUR MEDS BY ELIG CLIN: HCPCS | Performed by: FAMILY MEDICINE

## 2018-09-27 PROCEDURE — 1090F PRES/ABSN URINE INCON ASSESS: CPT | Performed by: FAMILY MEDICINE

## 2018-09-27 PROCEDURE — 99213 OFFICE O/P EST LOW 20 MIN: CPT | Performed by: FAMILY MEDICINE

## 2018-09-27 PROCEDURE — 3017F COLORECTAL CA SCREEN DOC REV: CPT | Performed by: FAMILY MEDICINE

## 2018-09-27 PROCEDURE — 90686 IIV4 VACC NO PRSV 0.5 ML IM: CPT | Performed by: FAMILY MEDICINE

## 2018-09-27 PROCEDURE — G0008 ADMIN INFLUENZA VIRUS VAC: HCPCS | Performed by: FAMILY MEDICINE

## 2018-09-27 PROCEDURE — G8417 CALC BMI ABV UP PARAM F/U: HCPCS | Performed by: FAMILY MEDICINE

## 2018-09-27 PROCEDURE — 4040F PNEUMOC VAC/ADMIN/RCVD: CPT | Performed by: FAMILY MEDICINE

## 2018-09-27 PROCEDURE — 1101F PT FALLS ASSESS-DOCD LE1/YR: CPT | Performed by: FAMILY MEDICINE

## 2018-09-27 PROCEDURE — 1123F ACP DISCUSS/DSCN MKR DOCD: CPT | Performed by: FAMILY MEDICINE

## 2018-09-27 RX ORDER — TIZANIDINE 2 MG/1
2 TABLET ORAL EVERY 8 HOURS PRN
Qty: 90 TABLET | Refills: 0 | Status: SHIPPED | OUTPATIENT
Start: 2018-09-27 | End: 2018-12-03 | Stop reason: ALTCHOICE

## 2018-09-27 NOTE — PROGRESS NOTES
CHOLECYSTECTOMY      COLONOSCOPY      ENDOMETRIAL BIOPSY      HERNIA REPAIR  47/37/5939    incarcertated herina repair    TUBAL LIGATION      UPPER GASTROINTESTINAL ENDOSCOPY      URETHRA SURGERY      stretched       Social History   Substance Use Topics    Smoking status: Never Smoker    Smokeless tobacco: Never Used    Alcohol use No       Family History   Problem Relation Age of Onset    Stroke Mother     Other Mother         dementia    Heart Disease Father     Cancer Sister         skin    High Blood Pressure Paternal Uncle     Stroke Maternal Grandmother     Diabetes Neg Hx          I have reviewed the patient's past medical history, past surgical history, allergies, medications, social and family history and I have made updates where appropriate.     ROS        PHYSICAL EXAM:  BP (!) 142/70   Pulse 68   Temp 97.6 °F (36.4 °C) (Oral)   Resp 16   Ht 5' 2\" (1.575 m)   Wt 213 lb (96.6 kg)   BMI 38.96 kg/m²     General Appearance: well developed and well- nourished, in no acute distress  Head: normocephalic and atraumatic  ENT: external ear and ear canal normal bilaterally, nose without deformity, nasal mucosa and turbinates normal without polyps, oropharynx normal, dentition is normal for age, no lip or gum lesions noted  Neck: supple and non-tender without mass, no thyromegaly or thyroid nodules, no cervical lymphadenopathy  Pulmonary/Chest: clear to auscultation bilaterally- no wheezes, rales or rhonchi, normal air movement, no respiratory distress or retractions  Cardiovascular: normal rate, regular rhythm, normal S1 and S2, no murmurs, rubs, clicks, or gallops, distal pulses intact  Abdomen: soft, non-tender, non-distended, no rebound or guarding, no masses or hernias noted, no hepatospleenomegaly  Extremities: no cyanosis, clubbing or edema of the lower extremities  Psych:  Normal affect without evidence of depression or anxiety, insight and judgement are appropriate, memory appears intact  Skin: warm and dry, no rash or erythema      ASSESSMENT & PLAN  Aftab Swann was seen today for other, other, headache, other and other. Diagnoses and all orders for this visit:    Recurrent occipital headache    Need for influenza vaccination  -     INFLUENZA, QUADV, 3 YRS AND OLDER, IM, PF, PREFILL SYR OR SDV, 0.5ML (FLUZONE QUADV, PF)    Other orders  -     tiZANidine (ZANAFLEX) 2 MG tablet; Take 1 tablet by mouth every 8 hours as needed (neck pain, headaches)    -No red flag sx  -Suspect this is more related to neck pain, will trial doing zanaflex and see if this helps with the symptoms.  -Will call in 2 weeks, if sx persist, will obtain imaging  -Patient advised to call immediately or go to ER if any worsening of symptoms      Return if symptoms worsen or fail to improve. Controlled Substances Monitoring:                     Aftab Swann received counseling on the following healthy behaviors: medication adherence  Reviewed prior labs and health maintenance. Continue current medications, diet and exercise. Discussed use, benefit, and side effects of prescribed medications. Barriers to medication compliance addressed. Patient given educational materials - see patient instructions. All patient questions answered. Patient voiced understanding.

## 2018-10-10 DIAGNOSIS — I10 HTN, GOAL BELOW 140/90: ICD-10-CM

## 2018-10-10 RX ORDER — LOSARTAN POTASSIUM 100 MG/1
100 TABLET ORAL DAILY
Qty: 90 TABLET | Refills: 3 | Status: SHIPPED | OUTPATIENT
Start: 2018-10-10 | End: 2019-09-09 | Stop reason: SDUPTHER

## 2018-10-11 ENCOUNTER — TELEPHONE (OUTPATIENT)
Dept: FAMILY MEDICINE CLINIC | Age: 73
End: 2018-10-11

## 2018-12-03 ENCOUNTER — OFFICE VISIT (OUTPATIENT)
Dept: SURGERY | Age: 73
End: 2018-12-03
Payer: MEDICARE

## 2018-12-03 VITALS
RESPIRATION RATE: 16 BRPM | SYSTOLIC BLOOD PRESSURE: 130 MMHG | HEART RATE: 73 BPM | OXYGEN SATURATION: 94 % | HEIGHT: 61 IN | WEIGHT: 221.4 LBS | TEMPERATURE: 97.3 F | BODY MASS INDEX: 41.8 KG/M2 | DIASTOLIC BLOOD PRESSURE: 70 MMHG

## 2018-12-03 DIAGNOSIS — D05.12 DUCTAL CARCINOMA IN SITU (DCIS) OF LEFT BREAST: Primary | ICD-10-CM

## 2018-12-03 PROCEDURE — 3017F COLORECTAL CA SCREEN DOC REV: CPT | Performed by: SURGERY

## 2018-12-03 PROCEDURE — 1123F ACP DISCUSS/DSCN MKR DOCD: CPT | Performed by: SURGERY

## 2018-12-03 PROCEDURE — G8399 PT W/DXA RESULTS DOCUMENT: HCPCS | Performed by: SURGERY

## 2018-12-03 PROCEDURE — 1101F PT FALLS ASSESS-DOCD LE1/YR: CPT | Performed by: SURGERY

## 2018-12-03 PROCEDURE — G8427 DOCREV CUR MEDS BY ELIG CLIN: HCPCS | Performed by: SURGERY

## 2018-12-03 PROCEDURE — G8417 CALC BMI ABV UP PARAM F/U: HCPCS | Performed by: SURGERY

## 2018-12-03 PROCEDURE — G8482 FLU IMMUNIZE ORDER/ADMIN: HCPCS | Performed by: SURGERY

## 2018-12-03 PROCEDURE — G8598 ASA/ANTIPLAT THER USED: HCPCS | Performed by: SURGERY

## 2018-12-03 PROCEDURE — 4040F PNEUMOC VAC/ADMIN/RCVD: CPT | Performed by: SURGERY

## 2018-12-03 PROCEDURE — 1036F TOBACCO NON-USER: CPT | Performed by: SURGERY

## 2018-12-03 PROCEDURE — 1090F PRES/ABSN URINE INCON ASSESS: CPT | Performed by: SURGERY

## 2018-12-03 PROCEDURE — 99213 OFFICE O/P EST LOW 20 MIN: CPT | Performed by: SURGERY

## 2018-12-05 ASSESSMENT — ENCOUNTER SYMPTOMS
CHEST TIGHTNESS: 1
COUGH: 0
SHORTNESS OF BREATH: 1
SORE THROAT: 0
BLOOD IN STOOL: 0
EYE ITCHING: 1
BACK PAIN: 0
ABDOMINAL DISTENTION: 0
SINUS PRESSURE: 0
NAUSEA: 0
TROUBLE SWALLOWING: 0
CONSTIPATION: 0
EYE PAIN: 0
ABDOMINAL PAIN: 0

## 2018-12-05 NOTE — PROGRESS NOTES
Linseed, (FLAXSEED OIL PO) Take by mouth daily       TURMERIC PO Take 500 mg by mouth 2 times daily       OLIVE LEAF EXTRACT PO Take 450 mg by mouth daily       GLUCOSAMINE-CHONDROITIN PO Take by mouth daily       Calcium Citrate-Vitamin D (CALCIUM CITRATE + D PO) Take  by mouth daily. No current facility-administered medications for this visit. Allergies  Allergies   Allergen Reactions    Acrylic Polymer [Carbomer] Hives    Coal Tar Extract Itching    Crestor [Rosuvastatin Calcium]      Myalgias    Gabapentin      \"mouth thick and swollen\"    Lipitor [Atorvastatin] Other (See Comments)     Myalgia      Myrbetriq [Mirabegron]     Other Other (See Comments)     novacaine - headaches    Tape [Adhesive Tape] Rash       Family History  Family History   Problem Relation Age of Onset    Stroke Mother     Other Mother         dementia    Heart Disease Father     Cancer Sister         skin    High Blood Pressure Paternal Uncle     Stroke Maternal Grandmother     Diabetes Neg Hx        Social History  Social History     Social History    Marital status:      Spouse name: N/A    Number of children: 3    Years of education: N/A     Occupational History    Not on file. Social History Main Topics    Smoking status: Never Smoker    Smokeless tobacco: Never Used    Alcohol use No    Drug use: No    Sexual activity: Not on file     Other Topics Concern    Not on file     Social History Narrative    No narrative on file           Review of Systems  Review of Systems   Constitutional: Positive for appetite change, fatigue and unexpected weight change. Negative for activity change. HENT: Negative for sinus pressure, sore throat and trouble swallowing. Eyes: Positive for itching. Negative for pain. Respiratory: Positive for chest tightness and shortness of breath. Negative for cough. Cardiovascular: Positive for chest pain and palpitations.    Gastrointestinal: Negative for Sampling  [facility] lymph node(s)    Specimen Laterality  Left    Tumor Site  Upper outer quadrant  Lower outer quadrant    Size (Extent) of DCIS  Site #1 - Upper Outer Quadrant:  Estimated size (extent) of DCIS (greatest dimension using gross and  microscopic evaluation): at least 2.2 cm    Site #2 - Lower Outer Quadrant:  Estimated size (extent) of DCIS (greatest dimension using gross and  microscopic evaluation): at least 1.6 cm    *Note:  Between the upper outer quadrant and lower outer quadrant  lesions, there is a distance of approximately 2.5 cm.  A representative  section taken between these two lesions (slide #8) demonstrates DCIS. Thus the extent of the DCIS in the specimen is most likely greater than  the larger lesion.     Histologic Type  Ductal carcinoma in situ.  Classified as Tis (DCIS) or Tis (Paget)        Architectural Patterns  [place]edo  Cribriform  Micropapillary  Papillary  Solid    Nuclear Grade  Grade III (high)    Necrosis  Present, focal (small foci or single cell necrosis)  Present, central (expansive \"comedo\" necrosis)    Margins  Site #1 - Upper Outer Quadrant:  Margin(s) uninvolved by DCIS   Distance from closest margin:   1.2 cm              Specify closest margin:  deep    Site #2 - Lower Outer Quadrant:  Margin(s) uninvolved by DCIS   Distance from closest margin:   2 cm              Specify closest margin:  deep    Lymph Nodes (required only if lymph nodes are present in the specimen)  Number of sentinel nodes examined:  2  Total number of nodes examined (sentinel and nonsentinel):  2  Number of lymph nodes with macrometastases ( > 0.2 cm):  0  Number of lymph nodes with micrometastases ( > 0.2 mm to 0.2 cm and/or  > 200 cells):  0  Number of lymph nodes with isolated tumor cells ( < 0.2 mm and =200  cells):  0    Method of Evaluation of [facility] Lymph Nodes  H&E, multiple levels    Pathologic Staging (pTNM)    Primary Tumor (pT)  pTis (DCIS): Ductal carcinoma in situ    * p63

## 2018-12-20 ENCOUNTER — HOSPITAL ENCOUNTER (OUTPATIENT)
Dept: WOMENS IMAGING | Age: 73
Discharge: HOME | End: 2018-12-20
Payer: MEDICARE

## 2018-12-20 DIAGNOSIS — Z12.31 VISIT FOR SCREENING MAMMOGRAM: ICD-10-CM

## 2018-12-20 PROCEDURE — 77067 SCR MAMMO BI INCL CAD: CPT

## 2018-12-20 PROCEDURE — 77063 BREAST TOMOSYNTHESIS BI: CPT

## 2018-12-27 ENCOUNTER — HOSPITAL ENCOUNTER (OUTPATIENT)
Dept: WOMENS IMAGING | Age: 73
Discharge: HOME | End: 2018-12-27
Payer: MEDICARE

## 2018-12-27 DIAGNOSIS — Z78.0 POSTMENOPAUSE: ICD-10-CM

## 2018-12-27 PROCEDURE — 77080 DXA BONE DENSITY AXIAL: CPT

## 2019-01-03 ENCOUNTER — NURSE ONLY (OUTPATIENT)
Dept: LAB | Age: 74
End: 2019-01-03

## 2019-01-03 LAB
CALCIUM SERPL-MCNC: 9.3 MG/DL (ref 8.5–10.5)
VITAMIN D 25-HYDROXY: 43 NG/ML (ref 30–100)

## 2019-01-16 ENCOUNTER — HOSPITAL ENCOUNTER (OUTPATIENT)
Age: 74
Setting detail: OBSERVATION
Discharge: HOME | End: 2019-01-17
Attending: INTERNAL MEDICINE | Admitting: INTERNAL MEDICINE
Payer: MEDICARE

## 2019-01-16 ENCOUNTER — APPOINTMENT (OUTPATIENT)
Dept: GENERAL RADIOLOGY | Age: 74
End: 2019-01-16
Payer: MEDICARE

## 2019-01-16 ENCOUNTER — NURSE TRIAGE (OUTPATIENT)
Dept: ADMINISTRATIVE | Age: 74
End: 2019-01-16

## 2019-01-16 DIAGNOSIS — I25.10 CORONARY ARTERY DISEASE INVOLVING NATIVE CORONARY ARTERY OF NATIVE HEART WITHOUT ANGINA PECTORIS: ICD-10-CM

## 2019-01-16 DIAGNOSIS — R07.9 CHEST PAIN, UNSPECIFIED TYPE: Primary | ICD-10-CM

## 2019-01-16 LAB
ALBUMIN SERPL-MCNC: 4.3 G/DL (ref 3.5–5.1)
ALP BLD-CCNC: 86 U/L (ref 38–126)
ALT SERPL-CCNC: 23 U/L (ref 11–66)
ANION GAP SERPL CALCULATED.3IONS-SCNC: 12 MEQ/L (ref 8–16)
AST SERPL-CCNC: 27 U/L (ref 5–40)
BACTERIA: NORMAL /HPF
BASOPHILS # BLD: 0.6 %
BASOPHILS ABSOLUTE: 0 THOU/MM3 (ref 0–0.1)
BILIRUB SERPL-MCNC: 0.5 MG/DL (ref 0.3–1.2)
BILIRUBIN DIRECT: < 0.2 MG/DL (ref 0–0.3)
BILIRUBIN URINE: NEGATIVE
BLOOD, URINE: NEGATIVE
BUN BLDV-MCNC: 10 MG/DL (ref 7–22)
CALCIUM SERPL-MCNC: 9.7 MG/DL (ref 8.5–10.5)
CASTS 2: NORMAL /LPF
CASTS UA: NORMAL /LPF
CHARACTER, URINE: CLEAR
CHLORIDE BLD-SCNC: 103 MEQ/L (ref 98–111)
CO2: 25 MEQ/L (ref 23–33)
COLOR: NORMAL
CREAT SERPL-MCNC: 0.5 MG/DL (ref 0.4–1.2)
CRYSTALS, UA: NORMAL
D-DIMER QUANTITATIVE: 431 NG/ML FEU (ref 0–500)
EKG ATRIAL RATE: 81 BPM
EKG P AXIS: 63 DEGREES
EKG P-R INTERVAL: 188 MS
EKG Q-T INTERVAL: 406 MS
EKG QRS DURATION: 146 MS
EKG QTC CALCULATION (BAZETT): 471 MS
EKG R AXIS: -26 DEGREES
EKG T AXIS: 102 DEGREES
EKG VENTRICULAR RATE: 81 BPM
EOSINOPHIL # BLD: 1.8 %
EOSINOPHILS ABSOLUTE: 0.1 THOU/MM3 (ref 0–0.4)
EPITHELIAL CELLS, UA: NORMAL /HPF
ERYTHROCYTE [DISTWIDTH] IN BLOOD BY AUTOMATED COUNT: 13.6 % (ref 11.5–14.5)
ERYTHROCYTE [DISTWIDTH] IN BLOOD BY AUTOMATED COUNT: 45.7 FL (ref 35–45)
GFR SERPL CREATININE-BSD FRML MDRD: > 90 ML/MIN/1.73M2
GLUCOSE BLD-MCNC: 93 MG/DL (ref 70–108)
GLUCOSE URINE: NEGATIVE MG/DL
HCT VFR BLD CALC: 43 % (ref 37–47)
HEMOGLOBIN: 13.8 GM/DL (ref 12–16)
IMMATURE GRANS (ABS): 0.02 THOU/MM3 (ref 0–0.07)
IMMATURE GRANULOCYTES: 0.3 %
INR BLD: 0.93 (ref 0.85–1.13)
KETONES, URINE: NEGATIVE
LEUKOCYTE ESTERASE, URINE: NEGATIVE
LIPASE: 36.5 U/L (ref 5.6–51.3)
LYMPHOCYTES # BLD: 31.4 %
LYMPHOCYTES ABSOLUTE: 2 THOU/MM3 (ref 1–4.8)
MCH RBC QN AUTO: 29.4 PG (ref 26–33)
MCHC RBC AUTO-ENTMCNC: 32.1 GM/DL (ref 32.2–35.5)
MCV RBC AUTO: 91.7 FL (ref 81–99)
MISCELLANEOUS 2: NORMAL
MONOCYTES # BLD: 10.6 %
MONOCYTES ABSOLUTE: 0.7 THOU/MM3 (ref 0.4–1.3)
NITRITE, URINE: NEGATIVE
NUCLEATED RED BLOOD CELLS: 0 /100 WBC
OSMOLALITY CALCULATION: 278.1 MOSMOL/KG (ref 275–300)
PH UA: 7.5
PLATELET # BLD: 177 THOU/MM3 (ref 130–400)
PMV BLD AUTO: 9.6 FL (ref 9.4–12.4)
POTASSIUM SERPL-SCNC: 3.7 MEQ/L (ref 3.5–5.2)
PRO-BNP: 37.9 PG/ML (ref 0–900)
PROTEIN UA: NEGATIVE
RBC # BLD: 4.69 MILL/MM3 (ref 4.2–5.4)
RBC URINE: NORMAL /HPF
RENAL EPITHELIAL, UA: NORMAL
SEG NEUTROPHILS: 55.3 %
SEGMENTED NEUTROPHILS ABSOLUTE COUNT: 3.6 THOU/MM3 (ref 1.8–7.7)
SODIUM BLD-SCNC: 140 MEQ/L (ref 135–145)
SPECIFIC GRAVITY, URINE: < 1.005 (ref 1–1.03)
TOTAL PROTEIN: 7.4 G/DL (ref 6.1–8)
TROPONIN T: < 0.01 NG/ML
TROPONIN T: < 0.01 NG/ML
UROBILINOGEN, URINE: 0.2 EU/DL
WBC # BLD: 6.5 THOU/MM3 (ref 4.8–10.8)
WBC UA: NORMAL /HPF
YEAST: NORMAL

## 2019-01-16 PROCEDURE — 36415 COLL VENOUS BLD VENIPUNCTURE: CPT

## 2019-01-16 PROCEDURE — 93005 ELECTROCARDIOGRAM TRACING: CPT | Performed by: INTERNAL MEDICINE

## 2019-01-16 PROCEDURE — 85025 COMPLETE CBC W/AUTO DIFF WBC: CPT

## 2019-01-16 PROCEDURE — 6370000000 HC RX 637 (ALT 250 FOR IP): Performed by: INTERNAL MEDICINE

## 2019-01-16 PROCEDURE — 85379 FIBRIN DEGRADATION QUANT: CPT

## 2019-01-16 PROCEDURE — 71101 X-RAY EXAM UNILAT RIBS/CHEST: CPT

## 2019-01-16 PROCEDURE — 6360000002 HC RX W HCPCS: Performed by: INTERNAL MEDICINE

## 2019-01-16 PROCEDURE — G0378 HOSPITAL OBSERVATION PER HR: HCPCS

## 2019-01-16 PROCEDURE — 99285 EMERGENCY DEPT VISIT HI MDM: CPT

## 2019-01-16 PROCEDURE — 87086 URINE CULTURE/COLONY COUNT: CPT

## 2019-01-16 PROCEDURE — 83880 ASSAY OF NATRIURETIC PEPTIDE: CPT

## 2019-01-16 PROCEDURE — 81001 URINALYSIS AUTO W/SCOPE: CPT

## 2019-01-16 PROCEDURE — 85610 PROTHROMBIN TIME: CPT

## 2019-01-16 PROCEDURE — 96372 THER/PROPH/DIAG INJ SC/IM: CPT

## 2019-01-16 PROCEDURE — 80076 HEPATIC FUNCTION PANEL: CPT

## 2019-01-16 PROCEDURE — 83690 ASSAY OF LIPASE: CPT

## 2019-01-16 PROCEDURE — 80048 BASIC METABOLIC PNL TOTAL CA: CPT

## 2019-01-16 PROCEDURE — 84484 ASSAY OF TROPONIN QUANT: CPT

## 2019-01-16 PROCEDURE — 93010 ELECTROCARDIOGRAM REPORT: CPT | Performed by: INTERNAL MEDICINE

## 2019-01-16 RX ORDER — SIMVASTATIN 10 MG
10 TABLET ORAL EVERY EVENING
Status: DISCONTINUED | OUTPATIENT
Start: 2019-01-16 | End: 2019-01-17

## 2019-01-16 RX ORDER — OXYBUTYNIN CHLORIDE 10 MG/1
10 TABLET, EXTENDED RELEASE ORAL DAILY
Status: DISCONTINUED | OUTPATIENT
Start: 2019-01-16 | End: 2019-01-17 | Stop reason: HOSPADM

## 2019-01-16 RX ORDER — ASPIRIN 81 MG/1
81 TABLET ORAL DAILY
Status: DISCONTINUED | OUTPATIENT
Start: 2019-01-17 | End: 2019-01-17 | Stop reason: HOSPADM

## 2019-01-16 RX ORDER — METOPROLOL SUCCINATE 25 MG/1
25 TABLET, EXTENDED RELEASE ORAL DAILY
Status: DISCONTINUED | OUTPATIENT
Start: 2019-01-16 | End: 2019-01-17 | Stop reason: HOSPADM

## 2019-01-16 RX ORDER — FAMOTIDINE 20 MG/1
20 TABLET, FILM COATED ORAL ONCE
Status: COMPLETED | OUTPATIENT
Start: 2019-01-16 | End: 2019-01-16

## 2019-01-16 RX ORDER — AMLODIPINE BESYLATE 5 MG/1
5 TABLET ORAL DAILY
Status: DISCONTINUED | OUTPATIENT
Start: 2019-01-16 | End: 2019-01-17 | Stop reason: HOSPADM

## 2019-01-16 RX ORDER — LOSARTAN POTASSIUM 100 MG/1
100 TABLET ORAL DAILY
Status: DISCONTINUED | OUTPATIENT
Start: 2019-01-16 | End: 2019-01-17 | Stop reason: HOSPADM

## 2019-01-16 RX ADMIN — ENOXAPARIN SODIUM 40 MG: 40 INJECTION SUBCUTANEOUS at 17:25

## 2019-01-16 RX ADMIN — SIMVASTATIN 10 MG: 10 TABLET, FILM COATED ORAL at 20:44

## 2019-01-16 RX ADMIN — FAMOTIDINE 20 MG: 20 TABLET, FILM COATED ORAL at 17:25

## 2019-01-16 ASSESSMENT — ENCOUNTER SYMPTOMS
EYE DISCHARGE: 0
SORE THROAT: 0
NAUSEA: 0
RHINORRHEA: 0
DIARRHEA: 0
BACK PAIN: 0
WHEEZING: 0
VOMITING: 0
COUGH: 0
SHORTNESS OF BREATH: 0
ABDOMINAL PAIN: 0
EYE PAIN: 0

## 2019-01-16 ASSESSMENT — PAIN SCALES - GENERAL
PAINLEVEL_OUTOF10: 0
PAINLEVEL_OUTOF10: 2

## 2019-01-16 ASSESSMENT — PAIN DESCRIPTION - LOCATION: LOCATION: BACK

## 2019-01-16 ASSESSMENT — PAIN DESCRIPTION - ORIENTATION: ORIENTATION: MID

## 2019-01-16 ASSESSMENT — PAIN DESCRIPTION - PAIN TYPE: TYPE: ACUTE PAIN

## 2019-01-17 ENCOUNTER — TELEPHONE (OUTPATIENT)
Dept: FAMILY MEDICINE CLINIC | Age: 74
End: 2019-01-17

## 2019-01-17 ENCOUNTER — APPOINTMENT (OUTPATIENT)
Dept: NON INVASIVE DIAGNOSTICS | Age: 74
End: 2019-01-17
Payer: MEDICARE

## 2019-01-17 VITALS
WEIGHT: 225 LBS | SYSTOLIC BLOOD PRESSURE: 123 MMHG | HEIGHT: 62 IN | HEART RATE: 68 BPM | BODY MASS INDEX: 41.41 KG/M2 | TEMPERATURE: 98.3 F | RESPIRATION RATE: 14 BRPM | DIASTOLIC BLOOD PRESSURE: 72 MMHG | OXYGEN SATURATION: 91 %

## 2019-01-17 LAB
ALBUMIN SERPL-MCNC: 3.9 G/DL (ref 3.5–5.1)
ALP BLD-CCNC: 74 U/L (ref 38–126)
ALT SERPL-CCNC: 22 U/L (ref 11–66)
ANION GAP SERPL CALCULATED.3IONS-SCNC: 14 MEQ/L (ref 8–16)
AST SERPL-CCNC: 29 U/L (ref 5–40)
BASOPHILS # BLD: 0.6 %
BASOPHILS ABSOLUTE: 0 THOU/MM3 (ref 0–0.1)
BILIRUB SERPL-MCNC: 0.7 MG/DL (ref 0.3–1.2)
BUN BLDV-MCNC: 12 MG/DL (ref 7–22)
CALCIUM SERPL-MCNC: 9.2 MG/DL (ref 8.5–10.5)
CHLORIDE BLD-SCNC: 104 MEQ/L (ref 98–111)
CHOLESTEROL, TOTAL: 222 MG/DL (ref 100–199)
CO2: 21 MEQ/L (ref 23–33)
CREAT SERPL-MCNC: 0.6 MG/DL (ref 0.4–1.2)
EOSINOPHIL # BLD: 2.6 %
EOSINOPHILS ABSOLUTE: 0.2 THOU/MM3 (ref 0–0.4)
ERYTHROCYTE [DISTWIDTH] IN BLOOD BY AUTOMATED COUNT: 13.9 % (ref 11.5–14.5)
ERYTHROCYTE [DISTWIDTH] IN BLOOD BY AUTOMATED COUNT: 46.9 FL (ref 35–45)
GFR SERPL CREATININE-BSD FRML MDRD: > 90 ML/MIN/1.73M2
GLUCOSE BLD-MCNC: 106 MG/DL (ref 70–108)
HCT VFR BLD CALC: 43.3 % (ref 37–47)
HDLC SERPL-MCNC: 65 MG/DL
HEMOGLOBIN: 13.6 GM/DL (ref 12–16)
IMMATURE GRANS (ABS): 0.02 THOU/MM3 (ref 0–0.07)
IMMATURE GRANULOCYTES: 0.3 %
LDL CHOLESTEROL CALCULATED: 121 MG/DL
LV EF: 53 %
LVEF MODALITY: NORMAL
LYMPHOCYTES # BLD: 33.7 %
LYMPHOCYTES ABSOLUTE: 2.2 THOU/MM3 (ref 1–4.8)
MCH RBC QN AUTO: 29.1 PG (ref 26–33)
MCHC RBC AUTO-ENTMCNC: 31.4 GM/DL (ref 32.2–35.5)
MCV RBC AUTO: 92.7 FL (ref 81–99)
MONOCYTES # BLD: 10.1 %
MONOCYTES ABSOLUTE: 0.7 THOU/MM3 (ref 0.4–1.3)
NUCLEATED RED BLOOD CELLS: 0 /100 WBC
ORGANISM: ABNORMAL
PLATELET # BLD: 188 THOU/MM3 (ref 130–400)
PMV BLD AUTO: 9.9 FL (ref 9.4–12.4)
POTASSIUM SERPL-SCNC: 3.9 MEQ/L (ref 3.5–5.2)
RBC # BLD: 4.67 MILL/MM3 (ref 4.2–5.4)
SEG NEUTROPHILS: 52.7 %
SEGMENTED NEUTROPHILS ABSOLUTE COUNT: 3.4 THOU/MM3 (ref 1.8–7.7)
SODIUM BLD-SCNC: 139 MEQ/L (ref 135–145)
TOTAL PROTEIN: 6.9 G/DL (ref 6.1–8)
TRIGL SERPL-MCNC: 182 MG/DL (ref 0–199)
URINE CULTURE REFLEX: ABNORMAL
WBC # BLD: 6.5 THOU/MM3 (ref 4.8–10.8)

## 2019-01-17 PROCEDURE — 6360000002 HC RX W HCPCS

## 2019-01-17 PROCEDURE — 3430000000 HC RX DIAGNOSTIC RADIOPHARMACEUTICAL: Performed by: INTERNAL MEDICINE

## 2019-01-17 PROCEDURE — 85025 COMPLETE CBC W/AUTO DIFF WBC: CPT

## 2019-01-17 PROCEDURE — A9500 TC99M SESTAMIBI: HCPCS | Performed by: INTERNAL MEDICINE

## 2019-01-17 PROCEDURE — 93017 CV STRESS TEST TRACING ONLY: CPT | Performed by: INTERNAL MEDICINE

## 2019-01-17 PROCEDURE — G0378 HOSPITAL OBSERVATION PER HR: HCPCS

## 2019-01-17 PROCEDURE — 99204 OFFICE O/P NEW MOD 45 MIN: CPT | Performed by: INTERNAL MEDICINE

## 2019-01-17 PROCEDURE — 80053 COMPREHEN METABOLIC PANEL: CPT

## 2019-01-17 PROCEDURE — 93306 TTE W/DOPPLER COMPLETE: CPT

## 2019-01-17 PROCEDURE — 96372 THER/PROPH/DIAG INJ SC/IM: CPT

## 2019-01-17 PROCEDURE — 36415 COLL VENOUS BLD VENIPUNCTURE: CPT

## 2019-01-17 PROCEDURE — 6360000002 HC RX W HCPCS: Performed by: INTERNAL MEDICINE

## 2019-01-17 PROCEDURE — 6370000000 HC RX 637 (ALT 250 FOR IP): Performed by: INTERNAL MEDICINE

## 2019-01-17 PROCEDURE — 80061 LIPID PANEL: CPT

## 2019-01-17 PROCEDURE — 78452 HT MUSCLE IMAGE SPECT MULT: CPT

## 2019-01-17 RX ORDER — SIMVASTATIN 20 MG
20 TABLET ORAL EVERY EVENING
Status: DISCONTINUED | OUTPATIENT
Start: 2019-01-17 | End: 2019-01-17 | Stop reason: HOSPADM

## 2019-01-17 RX ORDER — SIMVASTATIN 10 MG
20 TABLET ORAL EVERY EVENING
Qty: 90 TABLET | Refills: 0
Start: 2019-01-17 | End: 2019-02-12 | Stop reason: SDUPTHER

## 2019-01-17 RX ADMIN — AMLODIPINE BESYLATE 5 MG: 5 TABLET ORAL at 08:20

## 2019-01-17 RX ADMIN — Medication 11 MILLICURIE: at 11:15

## 2019-01-17 RX ADMIN — Medication 34.5 MILLICURIE: at 12:15

## 2019-01-17 RX ADMIN — ASPIRIN 81 MG: 81 TABLET, COATED ORAL at 13:41

## 2019-01-17 RX ADMIN — Medication 30 ML: at 13:41

## 2019-01-17 RX ADMIN — ENOXAPARIN SODIUM 40 MG: 40 INJECTION SUBCUTANEOUS at 13:41

## 2019-01-17 RX ADMIN — OXYBUTYNIN CHLORIDE 10 MG: 10 TABLET, EXTENDED RELEASE ORAL at 08:20

## 2019-01-17 ASSESSMENT — PAIN SCALES - GENERAL
PAINLEVEL_OUTOF10: 2
PAINLEVEL_OUTOF10: 2
PAINLEVEL_OUTOF10: 0

## 2019-01-17 ASSESSMENT — PAIN DESCRIPTION - PAIN TYPE: TYPE: ACUTE PAIN

## 2019-01-17 ASSESSMENT — PAIN DESCRIPTION - LOCATION
LOCATION: HEAD
LOCATION: CHEST

## 2019-01-23 ENCOUNTER — OFFICE VISIT (OUTPATIENT)
Dept: FAMILY MEDICINE CLINIC | Age: 74
End: 2019-01-23
Payer: MEDICARE

## 2019-01-23 VITALS
BODY MASS INDEX: 40.18 KG/M2 | WEIGHT: 226.8 LBS | SYSTOLIC BLOOD PRESSURE: 142 MMHG | RESPIRATION RATE: 16 BRPM | DIASTOLIC BLOOD PRESSURE: 80 MMHG | HEART RATE: 96 BPM | HEIGHT: 63 IN | TEMPERATURE: 98.2 F

## 2019-01-23 DIAGNOSIS — R07.89 ATYPICAL CHEST PAIN: Primary | ICD-10-CM

## 2019-01-23 DIAGNOSIS — E66.01 MORBID OBESITY WITH BMI OF 40.0-44.9, ADULT (HCC): ICD-10-CM

## 2019-01-23 DIAGNOSIS — G89.28 POST-MASTECTOMY PAIN SYNDROME: ICD-10-CM

## 2019-01-23 PROCEDURE — 99495 TRANSJ CARE MGMT MOD F2F 14D: CPT | Performed by: FAMILY MEDICINE

## 2019-01-23 RX ORDER — DULOXETIN HYDROCHLORIDE 30 MG/1
30 CAPSULE, DELAYED RELEASE ORAL DAILY
Qty: 30 CAPSULE | Refills: 3 | Status: SHIPPED | OUTPATIENT
Start: 2019-01-23 | End: 2019-01-23 | Stop reason: SDUPTHER

## 2019-01-23 RX ORDER — DULOXETIN HYDROCHLORIDE 30 MG/1
30 CAPSULE, DELAYED RELEASE ORAL DAILY
Qty: 30 CAPSULE | Refills: 0 | Status: SHIPPED | OUTPATIENT
Start: 2019-01-23 | End: 2019-02-12 | Stop reason: ALTCHOICE

## 2019-01-23 ASSESSMENT — PATIENT HEALTH QUESTIONNAIRE - PHQ9
2. FEELING DOWN, DEPRESSED OR HOPELESS: 0
SUM OF ALL RESPONSES TO PHQ QUESTIONS 1-9: 0
SUM OF ALL RESPONSES TO PHQ9 QUESTIONS 1 & 2: 0
1. LITTLE INTEREST OR PLEASURE IN DOING THINGS: 0
SUM OF ALL RESPONSES TO PHQ QUESTIONS 1-9: 0

## 2019-01-24 ENCOUNTER — NURSE ONLY (OUTPATIENT)
Dept: LAB | Age: 74
End: 2019-01-24

## 2019-01-29 ENCOUNTER — TELEPHONE (OUTPATIENT)
Dept: FAMILY MEDICINE CLINIC | Age: 74
End: 2019-01-29

## 2019-02-12 ENCOUNTER — OFFICE VISIT (OUTPATIENT)
Dept: CARDIOLOGY CLINIC | Age: 74
End: 2019-02-12
Payer: MEDICARE

## 2019-02-12 VITALS
WEIGHT: 221 LBS | SYSTOLIC BLOOD PRESSURE: 152 MMHG | BODY MASS INDEX: 39.15 KG/M2 | HEART RATE: 74 BPM | DIASTOLIC BLOOD PRESSURE: 78 MMHG

## 2019-02-12 DIAGNOSIS — Z90.12 HISTORY OF LEFT MASTECTOMY: ICD-10-CM

## 2019-02-12 DIAGNOSIS — Z85.3 HISTORY OF BREAST CANCER: ICD-10-CM

## 2019-02-12 DIAGNOSIS — I10 ESSENTIAL HYPERTENSION: ICD-10-CM

## 2019-02-12 DIAGNOSIS — I25.10 CORONARY ARTERY DISEASE INVOLVING NATIVE CORONARY ARTERY OF NATIVE HEART WITHOUT ANGINA PECTORIS: Primary | ICD-10-CM

## 2019-02-12 DIAGNOSIS — E78.5 HYPERLIPIDEMIA, UNSPECIFIED HYPERLIPIDEMIA TYPE: ICD-10-CM

## 2019-02-12 DIAGNOSIS — R07.89 ATYPICAL CHEST PAIN: ICD-10-CM

## 2019-02-12 PROCEDURE — 99213 OFFICE O/P EST LOW 20 MIN: CPT | Performed by: NURSE PRACTITIONER

## 2019-02-12 PROCEDURE — G8417 CALC BMI ABV UP PARAM F/U: HCPCS | Performed by: NURSE PRACTITIONER

## 2019-02-12 PROCEDURE — G8399 PT W/DXA RESULTS DOCUMENT: HCPCS | Performed by: NURSE PRACTITIONER

## 2019-02-12 PROCEDURE — G8598 ASA/ANTIPLAT THER USED: HCPCS | Performed by: NURSE PRACTITIONER

## 2019-02-12 PROCEDURE — 3017F COLORECTAL CA SCREEN DOC REV: CPT | Performed by: NURSE PRACTITIONER

## 2019-02-12 PROCEDURE — 1123F ACP DISCUSS/DSCN MKR DOCD: CPT | Performed by: NURSE PRACTITIONER

## 2019-02-12 PROCEDURE — 1101F PT FALLS ASSESS-DOCD LE1/YR: CPT | Performed by: NURSE PRACTITIONER

## 2019-02-12 PROCEDURE — 1090F PRES/ABSN URINE INCON ASSESS: CPT | Performed by: NURSE PRACTITIONER

## 2019-02-12 PROCEDURE — G8482 FLU IMMUNIZE ORDER/ADMIN: HCPCS | Performed by: NURSE PRACTITIONER

## 2019-02-12 PROCEDURE — 4040F PNEUMOC VAC/ADMIN/RCVD: CPT | Performed by: NURSE PRACTITIONER

## 2019-02-12 PROCEDURE — 1036F TOBACCO NON-USER: CPT | Performed by: NURSE PRACTITIONER

## 2019-02-12 PROCEDURE — G8427 DOCREV CUR MEDS BY ELIG CLIN: HCPCS | Performed by: NURSE PRACTITIONER

## 2019-02-12 RX ORDER — SIMVASTATIN 10 MG
20 TABLET ORAL EVERY EVENING
Qty: 90 TABLET | Refills: 3 | Status: SHIPPED | OUTPATIENT
Start: 2019-02-12 | End: 2019-02-18 | Stop reason: SDUPTHER

## 2019-02-14 DIAGNOSIS — I25.10 CORONARY ARTERY DISEASE INVOLVING NATIVE CORONARY ARTERY OF NATIVE HEART WITHOUT ANGINA PECTORIS: ICD-10-CM

## 2019-02-14 RX ORDER — SIMVASTATIN 20 MG
20 TABLET ORAL EVERY EVENING
Qty: 90 TABLET | Refills: 3 | Status: CANCELLED | OUTPATIENT
Start: 2019-02-14

## 2019-02-18 ENCOUNTER — OFFICE VISIT (OUTPATIENT)
Dept: FAMILY MEDICINE CLINIC | Age: 74
End: 2019-02-18
Payer: MEDICARE

## 2019-02-18 VITALS
WEIGHT: 229 LBS | HEART RATE: 72 BPM | BODY MASS INDEX: 40.57 KG/M2 | SYSTOLIC BLOOD PRESSURE: 130 MMHG | DIASTOLIC BLOOD PRESSURE: 76 MMHG | HEIGHT: 63 IN | RESPIRATION RATE: 16 BRPM | TEMPERATURE: 98.2 F

## 2019-02-18 DIAGNOSIS — G62.9 NEUROPATHY: Primary | ICD-10-CM

## 2019-02-18 DIAGNOSIS — I25.10 CORONARY ARTERY DISEASE INVOLVING NATIVE CORONARY ARTERY OF NATIVE HEART WITHOUT ANGINA PECTORIS: ICD-10-CM

## 2019-02-18 DIAGNOSIS — G89.18 POST-MASTECTOMY PAIN: ICD-10-CM

## 2019-02-18 PROCEDURE — 1101F PT FALLS ASSESS-DOCD LE1/YR: CPT | Performed by: FAMILY MEDICINE

## 2019-02-18 PROCEDURE — 99213 OFFICE O/P EST LOW 20 MIN: CPT | Performed by: FAMILY MEDICINE

## 2019-02-18 PROCEDURE — 1036F TOBACCO NON-USER: CPT | Performed by: FAMILY MEDICINE

## 2019-02-18 PROCEDURE — 1123F ACP DISCUSS/DSCN MKR DOCD: CPT | Performed by: FAMILY MEDICINE

## 2019-02-18 PROCEDURE — 4040F PNEUMOC VAC/ADMIN/RCVD: CPT | Performed by: FAMILY MEDICINE

## 2019-02-18 PROCEDURE — 3017F COLORECTAL CA SCREEN DOC REV: CPT | Performed by: FAMILY MEDICINE

## 2019-02-18 PROCEDURE — G8482 FLU IMMUNIZE ORDER/ADMIN: HCPCS | Performed by: FAMILY MEDICINE

## 2019-02-18 PROCEDURE — G8598 ASA/ANTIPLAT THER USED: HCPCS | Performed by: FAMILY MEDICINE

## 2019-02-18 PROCEDURE — G8399 PT W/DXA RESULTS DOCUMENT: HCPCS | Performed by: FAMILY MEDICINE

## 2019-02-18 PROCEDURE — G8427 DOCREV CUR MEDS BY ELIG CLIN: HCPCS | Performed by: FAMILY MEDICINE

## 2019-02-18 PROCEDURE — 1090F PRES/ABSN URINE INCON ASSESS: CPT | Performed by: FAMILY MEDICINE

## 2019-02-18 PROCEDURE — G8417 CALC BMI ABV UP PARAM F/U: HCPCS | Performed by: FAMILY MEDICINE

## 2019-02-18 RX ORDER — OMEPRAZOLE 20 MG/1
40 CAPSULE, DELAYED RELEASE ORAL DAILY
COMMUNITY
End: 2021-06-22 | Stop reason: SDUPTHER

## 2019-02-19 RX ORDER — SIMVASTATIN 20 MG
20 TABLET ORAL EVERY EVENING
Qty: 90 TABLET | Refills: 3 | Status: SHIPPED | OUTPATIENT
Start: 2019-02-19 | End: 2020-02-14

## 2019-02-20 ENCOUNTER — OFFICE VISIT (OUTPATIENT)
Dept: RHEUMATOLOGY | Age: 74
End: 2019-02-20
Payer: MEDICARE

## 2019-02-20 VITALS
WEIGHT: 230.8 LBS | OXYGEN SATURATION: 94 % | DIASTOLIC BLOOD PRESSURE: 74 MMHG | SYSTOLIC BLOOD PRESSURE: 136 MMHG | BODY MASS INDEX: 40.89 KG/M2 | HEART RATE: 83 BPM | HEIGHT: 63 IN

## 2019-02-20 DIAGNOSIS — M85.89 OSTEOPENIA OF MULTIPLE SITES: Primary | ICD-10-CM

## 2019-02-20 DIAGNOSIS — R53.83 OTHER FATIGUE: ICD-10-CM

## 2019-02-20 PROCEDURE — 99214 OFFICE O/P EST MOD 30 MIN: CPT | Performed by: NURSE PRACTITIONER

## 2019-02-20 PROCEDURE — G8482 FLU IMMUNIZE ORDER/ADMIN: HCPCS | Performed by: NURSE PRACTITIONER

## 2019-02-20 PROCEDURE — G8417 CALC BMI ABV UP PARAM F/U: HCPCS | Performed by: NURSE PRACTITIONER

## 2019-02-20 PROCEDURE — 4040F PNEUMOC VAC/ADMIN/RCVD: CPT | Performed by: NURSE PRACTITIONER

## 2019-02-20 PROCEDURE — G8427 DOCREV CUR MEDS BY ELIG CLIN: HCPCS | Performed by: NURSE PRACTITIONER

## 2019-02-20 PROCEDURE — 1036F TOBACCO NON-USER: CPT | Performed by: NURSE PRACTITIONER

## 2019-02-20 PROCEDURE — 1090F PRES/ABSN URINE INCON ASSESS: CPT | Performed by: NURSE PRACTITIONER

## 2019-02-20 PROCEDURE — 3017F COLORECTAL CA SCREEN DOC REV: CPT | Performed by: NURSE PRACTITIONER

## 2019-02-20 PROCEDURE — 1101F PT FALLS ASSESS-DOCD LE1/YR: CPT | Performed by: NURSE PRACTITIONER

## 2019-02-20 PROCEDURE — G8399 PT W/DXA RESULTS DOCUMENT: HCPCS | Performed by: NURSE PRACTITIONER

## 2019-02-20 PROCEDURE — 1123F ACP DISCUSS/DSCN MKR DOCD: CPT | Performed by: NURSE PRACTITIONER

## 2019-02-20 PROCEDURE — G8598 ASA/ANTIPLAT THER USED: HCPCS | Performed by: NURSE PRACTITIONER

## 2019-02-20 ASSESSMENT — ENCOUNTER SYMPTOMS
CONSTIPATION: 1
PHOTOPHOBIA: 0
COUGH: 0
SHORTNESS OF BREATH: 1
TROUBLE SWALLOWING: 1
EYE REDNESS: 0
ABDOMINAL PAIN: 1
BACK PAIN: 1
EYES NEGATIVE: 1
EYE PAIN: 0

## 2019-02-21 ENCOUNTER — TELEPHONE (OUTPATIENT)
Dept: RHEUMATOLOGY | Age: 74
End: 2019-02-21

## 2019-02-21 ENCOUNTER — NURSE ONLY (OUTPATIENT)
Dept: LAB | Age: 74
End: 2019-02-21

## 2019-02-21 DIAGNOSIS — M85.89 OSTEOPENIA OF MULTIPLE SITES: ICD-10-CM

## 2019-02-21 DIAGNOSIS — R53.83 OTHER FATIGUE: ICD-10-CM

## 2019-02-21 LAB
C-REACTIVE PROTEIN: 0.51 MG/DL (ref 0–1)
SEDIMENTATION RATE, ERYTHROCYTE: 17 MM/HR (ref 0–20)
TSH SERPL DL<=0.05 MIU/L-ACNC: 1.72 UIU/ML (ref 0.4–4.2)

## 2019-02-25 ENCOUNTER — HOSPITAL ENCOUNTER (OUTPATIENT)
Age: 74
Setting detail: SPECIMEN
Discharge: HOME | End: 2019-02-25
Payer: MEDICARE

## 2019-02-25 PROCEDURE — 82340 ASSAY OF CALCIUM IN URINE: CPT

## 2019-02-25 PROCEDURE — 84300 ASSAY OF URINE SODIUM: CPT

## 2019-02-25 PROCEDURE — 82570 ASSAY OF URINE CREATININE: CPT

## 2019-02-26 DIAGNOSIS — M85.89 OSTEOPENIA OF MULTIPLE SITES: ICD-10-CM

## 2019-02-26 LAB
CALCIUM URINE: 108 MG/24HR (ref 100–240)
CALCIUM URINE: 6.3 MG/DL
CREATININE URINE: 1.2 GM/24HR
CREATININE URINE: 70.9 MG/DL
HOURS COLLECTED: 24 HRS
SODIUM URINE: 123 MEQ/24HR (ref 75–200)
SODIUM URINE: 72 MEQ/L
URINE VOLUME MEASURE: 1710 ML
URINE VOLUME, 24 HOUR: 1710 ML
URINE VOLUME, 24 HOUR: 1710 ML

## 2019-02-26 PROCEDURE — 81050 URINALYSIS VOLUME MEASURE: CPT

## 2019-03-12 ENCOUNTER — TELEPHONE (OUTPATIENT)
Dept: FAMILY MEDICINE CLINIC | Age: 74
End: 2019-03-12

## 2019-06-12 ENCOUNTER — OFFICE VISIT (OUTPATIENT)
Dept: CARDIOLOGY CLINIC | Age: 74
End: 2019-06-12
Payer: MEDICARE

## 2019-06-12 VITALS
WEIGHT: 228 LBS | HEIGHT: 62 IN | DIASTOLIC BLOOD PRESSURE: 72 MMHG | HEART RATE: 72 BPM | BODY MASS INDEX: 41.96 KG/M2 | SYSTOLIC BLOOD PRESSURE: 142 MMHG

## 2019-06-12 DIAGNOSIS — I10 ESSENTIAL HYPERTENSION: Primary | ICD-10-CM

## 2019-06-12 DIAGNOSIS — E78.5 HYPERLIPIDEMIA, UNSPECIFIED HYPERLIPIDEMIA TYPE: ICD-10-CM

## 2019-06-12 PROCEDURE — 99213 OFFICE O/P EST LOW 20 MIN: CPT | Performed by: INTERNAL MEDICINE

## 2019-06-12 PROCEDURE — 1036F TOBACCO NON-USER: CPT | Performed by: INTERNAL MEDICINE

## 2019-06-12 PROCEDURE — 4040F PNEUMOC VAC/ADMIN/RCVD: CPT | Performed by: INTERNAL MEDICINE

## 2019-06-12 PROCEDURE — 1090F PRES/ABSN URINE INCON ASSESS: CPT | Performed by: INTERNAL MEDICINE

## 2019-06-12 PROCEDURE — G8427 DOCREV CUR MEDS BY ELIG CLIN: HCPCS | Performed by: INTERNAL MEDICINE

## 2019-06-12 PROCEDURE — G8598 ASA/ANTIPLAT THER USED: HCPCS | Performed by: INTERNAL MEDICINE

## 2019-06-12 PROCEDURE — 1123F ACP DISCUSS/DSCN MKR DOCD: CPT | Performed by: INTERNAL MEDICINE

## 2019-06-12 PROCEDURE — G8417 CALC BMI ABV UP PARAM F/U: HCPCS | Performed by: INTERNAL MEDICINE

## 2019-06-12 PROCEDURE — 3017F COLORECTAL CA SCREEN DOC REV: CPT | Performed by: INTERNAL MEDICINE

## 2019-06-12 PROCEDURE — G8399 PT W/DXA RESULTS DOCUMENT: HCPCS | Performed by: INTERNAL MEDICINE

## 2019-06-12 RX ORDER — METOPROLOL SUCCINATE 25 MG/1
25 TABLET, EXTENDED RELEASE ORAL DAILY
Qty: 90 TABLET | Refills: 3 | Status: SHIPPED | OUTPATIENT
Start: 2019-06-12 | End: 2020-06-08

## 2019-06-12 NOTE — PROGRESS NOTES
and neck pain. Neurological: Negative for numbness and headaches. Psychiatric/Behavioral: Negative for agitation, confusion, decreased concentration and dysphoric mood. Objective:     Ht 5' 2\" (1.575 m)   Wt 228 lb (103.4 kg)   BMI 41.70 kg/m²     Wt Readings from Last 3 Encounters:   06/12/19 228 lb (103.4 kg)   02/20/19 230 lb 12.8 oz (104.7 kg)   02/18/19 229 lb (103.9 kg)     BP Readings from Last 3 Encounters:   02/20/19 136/74   02/18/19 130/76   02/12/19 (!) 152/78       Nursing note and vitals reviewed. Physical Exam   Constitutional: Oriented to person, place, and time. Appears well-developed and well-nourished. HENT:   Head: Normocephalic and atraumatic. Eyes: EOM are normal. Pupils are equal, round, and reactive to light. Neck: Normal range of motion. Neck supple. No JVD present. Cardiovascular: Normal rate, regular rhythm, normal heart sounds and intact distal pulses. No murmur heard. Pulmonary/Chest: Effort normal and breath sounds normal. No respiratory distress. No wheezes. No rales. Abdominal: Soft. Bowel sounds are normal. No distension. There is no tenderness. Musculoskeletal: Normal range of motion. No edema. Neurological: Alert and oriented to person, place, and time. No cranial nerve deficit. Coordination normal.   Skin: Skin is warm and dry. Psychiatric: Normal mood and affect.        Lab Results   Component Value Date    CKTOTAL 76 07/20/2018       Lab Results   Component Value Date    WBC 6.5 01/17/2019    RBC 4.67 01/17/2019    HGB 13.6 01/17/2019    HCT 43.3 01/17/2019    MCV 92.7 01/17/2019    MCH 29.1 01/17/2019    MCHC 31.4 01/17/2019    RDW 13.9 04/06/2018     01/17/2019    MPV 9.9 01/17/2019       Lab Results   Component Value Date     01/17/2019    K 3.9 01/17/2019     01/17/2019    CO2 21 01/17/2019    BUN 12 01/17/2019    LABALBU 3.9 01/17/2019    CREATININE 0.6 01/17/2019    CALCIUM 9.2 01/17/2019    LABGLOM >90 01/17/2019 GLUCOSE 106 01/17/2019    GLUCOSE 93 04/06/2018       Lab Results   Component Value Date    ALKPHOS 74 01/17/2019    ALT 22 01/17/2019    AST 29 01/17/2019    PROT 6.9 01/17/2019    BILITOT 0.7 01/17/2019    BILIDIR <0.2 01/16/2019    LABALBU 3.9 01/17/2019       No results found for: MG    Lab Results   Component Value Date    INR 0.93 01/16/2019    INR 0.97 10/22/2017         Lab Results   Component Value Date    LABA1C 5.8 04/25/2017       Lab Results   Component Value Date    TRIG 182 01/17/2019    HDL 65 01/17/2019    LDLCALC 121 01/17/2019    LDLDIRECT 142 04/06/2018       Lab Results   Component Value Date    TSH 1.720 02/21/2019         Testing Reviewed:      I have individually reviewed the cardiac test below:    ECHO:   Results for orders placed during the hospital encounter of 01/16/19   Echocardiogram 2D/ M-Mode/ Colorflow/ Do    Narrative Transthoracic Echocardiography Report (TTE)     Demographics      Patient Name   Isidro Console Gender              Female                  M      MR #           [id number]         Race                                                       Ethnicity      Account #      [de-identified]         Room Number         0020      Accession      [id number]         Date of Study       01/17/2019   Number      Date of Birth  1945        Referring Physician Ridge Gonzalez MD      Age            72 year(s)        Cesia Gold MD                                    Physician     Procedure    Type of Study      TTE procedure:ECHOCARDIOGRAM COMPLETE 2D W DOPPLER W COLOR. Procedure Date  Date: 01/17/2019 Start: 10:41 AM    Study Location: Bedside  Technical Quality: Adequate visualization    Indications:Chest pain.     Additional Medical History:Chest pain, Hyperlipidemia, Hypertension,  Abnormal stress, Breast cancer, Left mastectomy    Patient Status: Routine    Height: 61.81 inches Weight: 224.87 pounds BSA: 2 m^2 BMI: 41.38 kg/m^2    BP: 106/60 mmHg    Allergies    - See Epic. Conclusions      Summary   Ejection fraction was estimated at 50-55%. There were no regional left ventricular wall motion abnormalities and wall   thickness was within normal limits. Doppler parameters were consistent with abnormal left ventricular   relaxation (grade 1 diastolic dysfunction). There was trace aortic regurgitation. There was mild tricuspid regurgitation. IVC is within normal limits with normal respiratory phasic changes. Signature      ----------------------------------------------------------------   Electronically signed by Luci Woodard MD (Interpreting   physician) on 01/17/2019 at 04:57 PM   ----------------------------------------------------------------      Findings      Mitral Valve   The mitral valve structure was normal with normal leaflet separation. DOPPLER: The transmitral velocity was within the normal range with no   evidence for mitral stenosis. There was no evidence of mitral   regurgitation. Aortic Valve   The aortic valve was trileaflet with normal thickness and cuspal   separation. DOPPLER: Transaortic velocity was within the normal range with   no evidence of aortic stenosis. There was trace aortic regurgitation. Tricuspid Valve   The tricuspid valve structure was normal with normal leaflet separation. DOPPLER: There was no evidence of tricuspid stenosis. There was mild   tricuspid regurgitation. Pulmonic Valve   The pulmonic valve leaflets were not well seen. DOPPLER: The transpulmonic   velocity was within the normal range with mild regurgitation. Left Atrium   Left atrial size was normal.      Left Ventricle   Normal left ventricle size and systolic function.  Ejection fraction was estimated at 50-55%. There were no regional left ventricular wall motion   abnormalities and wall thickness was within normal limits. Doppler parameters were consistent with abnormal left ventricular   relaxation (grade 1 diastolic dysfunction). Right Atrium   Right atrial size was normal.      Right Ventricle   The right ventricular size was normal with normal systolic function and   wall thickness. Pericardial Effusion   The pericardium was normal in appearance with no evidence of a pericardial   effusion. Pleural Effusion   No evidence of pleural effusion. Aorta / Great Vessels   IVC is within normal limits with normal respiratory phasic changes.      M-Mode/2D Measurements & Calculations      LV Diastolic    LV Systolic Dimension: 3  AV Cusp Separation: 2 cmLA   Dimension: 4.1  cm                        Dimension: 2.6 cmAO Root   cm              LV Volume Diastolic: 64.7 Dimension: 3 cmLA Area: 17.6   LV FS:26.8 %    ml                        cm^2   LV PW           LV Volume Systolic: 35 ml   Diastolic: 0.7  LV EDV/LV EDV Index: 74.2   cm              ml/37 m^2LV ESV/LV ESV   Septum          Index: 35 ml/18 m^2       RV Diastolic Dimension: 2.5 cm   Diastolic: 1 cm EF Calculated: 52.8 %                                             LA/Aorta: 0.87                                                LA volume/Index: 52.8 ml /26m^2     Doppler Measurements & Calculations      MV Peak E-Wave: 75.1 cm/s  AV Peak Velocity: 148 LVOT Peak Velocity: 118   MV Peak A-Wave: 96.1 cm/s  cm/s                  cm/s   MV E/A Ratio: 0.78         AV Peak Gradient:     LVOT Peak Gradient: 6   MV Peak Gradient: 2.26     8.76 mmHg             mmHg   mmHg                                                    TV Peak E-Wave: 62.9 cm/s   MV Deceleration Time: 232                        TV Peak A-Wave: 61.6 cm/s   msec                              AV P1/2t: 556 msec    TV Peak Gradient: 1.58                              IVRT: 99 msec         mmHg   MV E' Septal Velocity: 7.8                       TR Velocity:271 cm/s   cm/s                                             TR Gradient:29.38 mmHg   MV A' Septal Velocity:     AV DVI (Vmax):0.8     PV Peak Velocity: 104   10.1 cm/s                                        cm/s   MV E' Lateral Velocity:                          PV Peak Gradient: 4.33   5.8 cm/s                                         mmHg   MV A' Lateral Velocity:   12.5 cm/s   E/E' septal: 9.63                                NV ED Velocity: 122 cm/s   E/E' lateral: 12.95     [email or web address] [id number]        Assessment/Plan   HTN  HLD  Lexiscan negative for ischemia  Preserved EF  LBBB  No major issues since hospitalization. Check vitals and monitor. Continue ASA, CCB, BB, Losartan. Discussed diet/exercise/BP/weight loss/health lifestyle choices/lipids; the patient understands the goals and will try to comply.     Disposition:  12 month         Electronically signed by Sky Leon MD   6/12/2019 at 2:37 PM

## 2019-07-15 ENCOUNTER — OFFICE VISIT (OUTPATIENT)
Dept: FAMILY MEDICINE CLINIC | Age: 74
End: 2019-07-15
Payer: MEDICARE

## 2019-07-15 VITALS
WEIGHT: 229.2 LBS | RESPIRATION RATE: 16 BRPM | DIASTOLIC BLOOD PRESSURE: 70 MMHG | TEMPERATURE: 97.6 F | BODY MASS INDEX: 42.18 KG/M2 | HEIGHT: 62 IN | SYSTOLIC BLOOD PRESSURE: 124 MMHG | HEART RATE: 76 BPM

## 2019-07-15 DIAGNOSIS — Z00.00 ROUTINE GENERAL MEDICAL EXAMINATION AT A HEALTH CARE FACILITY: Primary | ICD-10-CM

## 2019-07-15 PROCEDURE — 1123F ACP DISCUSS/DSCN MKR DOCD: CPT | Performed by: FAMILY MEDICINE

## 2019-07-15 PROCEDURE — 3017F COLORECTAL CA SCREEN DOC REV: CPT | Performed by: FAMILY MEDICINE

## 2019-07-15 PROCEDURE — 4040F PNEUMOC VAC/ADMIN/RCVD: CPT | Performed by: FAMILY MEDICINE

## 2019-07-15 PROCEDURE — G0439 PPPS, SUBSEQ VISIT: HCPCS | Performed by: FAMILY MEDICINE

## 2019-07-15 PROCEDURE — G8598 ASA/ANTIPLAT THER USED: HCPCS | Performed by: FAMILY MEDICINE

## 2019-07-15 ASSESSMENT — PATIENT HEALTH QUESTIONNAIRE - PHQ9
SUM OF ALL RESPONSES TO PHQ QUESTIONS 1-9: 0
SUM OF ALL RESPONSES TO PHQ QUESTIONS 1-9: 0

## 2019-07-15 ASSESSMENT — LIFESTYLE VARIABLES: HOW OFTEN DO YOU HAVE A DRINK CONTAINING ALCOHOL: 0

## 2019-07-15 NOTE — PATIENT INSTRUCTIONS
Personalized Preventive Plan for Milena Ron - 7/15/2019  Medicare offers a range of preventive health benefits. Some of the tests and screenings are paid in full while other may be subject to a deductible, co-insurance, and/or copay. Some of these benefits include a comprehensive review of your medical history including lifestyle, illnesses that may run in your family, and various assessments and screenings as appropriate. After reviewing your medical record and screening and assessments performed today your provider may have ordered immunizations, labs, imaging, and/or referrals for you. A list of these orders (if applicable) as well as your Preventive Care list are included within your After Visit Summary for your review. Other Preventive Recommendations:    · A preventive eye exam performed by an eye specialist is recommended every 1-2 years to screen for glaucoma; cataracts, macular degeneration, and other eye disorders. · A preventive dental visit is recommended every 6 months. · Try to get at least 150 minutes of exercise per week or 10,000 steps per day on a pedometer . · Order or download the FREE \"Exercise & Physical Activity: Your Everyday Guide\" from [employer]. Call 3-240.127.1141 or search [employer] online. · You need 9590-9570 mg of calcium and 5599-3224 IU of vitamin D per day. It is possible to meet your calcium requirement with diet alone, but a vitamin D supplement is usually necessary to meet this goal.  · When exposed to the sun, use a sunscreen that protects against both UVA and UVB radiation with an SPF of 30 or greater. Reapply every 2 to 3 hours or after sweating, drying off with a towel, or swimming. · Always wear a seat belt when traveling in a car. Always wear a helmet when riding a bicycle or motorcycle.

## 2019-08-19 ENCOUNTER — OFFICE VISIT (OUTPATIENT)
Dept: FAMILY MEDICINE CLINIC | Age: 74
End: 2019-08-19
Payer: MEDICARE

## 2019-08-19 VITALS
RESPIRATION RATE: 18 BRPM | WEIGHT: 230 LBS | HEART RATE: 72 BPM | SYSTOLIC BLOOD PRESSURE: 132 MMHG | HEIGHT: 62 IN | TEMPERATURE: 97.7 F | BODY MASS INDEX: 42.33 KG/M2 | DIASTOLIC BLOOD PRESSURE: 65 MMHG

## 2019-08-19 DIAGNOSIS — M77.50 TENDONITIS OF FOOT: Primary | ICD-10-CM

## 2019-08-19 DIAGNOSIS — R21 RASH: ICD-10-CM

## 2019-08-19 PROCEDURE — 99213 OFFICE O/P EST LOW 20 MIN: CPT | Performed by: FAMILY MEDICINE

## 2019-08-19 PROCEDURE — G8598 ASA/ANTIPLAT THER USED: HCPCS | Performed by: FAMILY MEDICINE

## 2019-08-19 PROCEDURE — G8417 CALC BMI ABV UP PARAM F/U: HCPCS | Performed by: FAMILY MEDICINE

## 2019-08-19 PROCEDURE — G8399 PT W/DXA RESULTS DOCUMENT: HCPCS | Performed by: FAMILY MEDICINE

## 2019-08-19 PROCEDURE — 4040F PNEUMOC VAC/ADMIN/RCVD: CPT | Performed by: FAMILY MEDICINE

## 2019-08-19 PROCEDURE — 1090F PRES/ABSN URINE INCON ASSESS: CPT | Performed by: FAMILY MEDICINE

## 2019-08-19 PROCEDURE — 1036F TOBACCO NON-USER: CPT | Performed by: FAMILY MEDICINE

## 2019-08-19 PROCEDURE — 1123F ACP DISCUSS/DSCN MKR DOCD: CPT | Performed by: FAMILY MEDICINE

## 2019-08-19 PROCEDURE — G8427 DOCREV CUR MEDS BY ELIG CLIN: HCPCS | Performed by: FAMILY MEDICINE

## 2019-08-19 PROCEDURE — 3017F COLORECTAL CA SCREEN DOC REV: CPT | Performed by: FAMILY MEDICINE

## 2019-08-19 RX ORDER — PREDNISONE 10 MG/1
TABLET ORAL
Qty: 30 TABLET | Refills: 0 | Status: SHIPPED | OUTPATIENT
Start: 2019-08-19 | End: 2019-09-23

## 2019-08-19 NOTE — PROGRESS NOTES
rubs, clicks, or gallops, distal pulses intact  Extremities: no cyanosis, clubbing or edema of the lower extremities  Psych:  Normal affect without evidence of depression oranxiety, insight and judgement are appropriate, memory appears intact  Skin: warm and dry, +hyperpigmented patch with scaling on the left proximal forearm, no pain noted  Physical Exam   Musculoskeletal:        Right foot: There is normal range of motion, no tenderness, no bony tenderness, no swelling and no deformity. ASSESSMENT & PLAN  Uri Mckeon was seen today for 6 month follow-up, tingling and other. Diagnoses and all orders for this visit:    Tendonitis of foot  -     predniSONE (DELTASONE) 10 MG tablet; Take 4 tabs PO x 3 days, then take 3 tabs PO x 3 days, then take 2 tabs PO x 3 days, then take 1 tab PO x 3 days    Rash  -     betamethasone valerate (VALISONE) 0.1 % cream; Apply topically 2 times daily PRN. -foot pain consistent with tendonitis, advised on Ice, conservative care. Will do prednisone taper, advised to call if sx persisting, next step would be podiatry referral.  -Trial steroid cream for rash, if sx persist, will obtain bx    Return in about 6 months (around 2/19/2020). Controlled Substance Monitoring:    Acute and Chronic Pain Monitoring:   No flowsheet data found. Uri Mckeon received counseling on the following healthy behaviors: medication adherence  Reviewed prior labs and health maintenance. Continue current medications, diet and exercise. Discussed use, benefit, and side effects of prescribed medications. Barriers to medication compliance addressed. Patient given educational materials - see patient instructions. All patient questions answered. Patient voiced understanding.

## 2019-08-26 ENCOUNTER — NURSE ONLY (OUTPATIENT)
Dept: LAB | Age: 74
End: 2019-08-26

## 2019-08-26 LAB
ALBUMIN SERPL-MCNC: 4.5 G/DL (ref 3.5–5.1)
ALP BLD-CCNC: 86 U/L (ref 38–126)
ALT SERPL-CCNC: 23 U/L (ref 11–66)
AST SERPL-CCNC: 28 U/L (ref 5–40)
BILIRUB SERPL-MCNC: 0.4 MG/DL (ref 0.3–1.2)
BILIRUBIN DIRECT: < 0.2 MG/DL (ref 0–0.3)
TOTAL PROTEIN: 7.3 G/DL (ref 6.1–8)

## 2019-09-09 ENCOUNTER — TELEPHONE (OUTPATIENT)
Dept: FAMILY MEDICINE CLINIC | Age: 74
End: 2019-09-09

## 2019-09-09 DIAGNOSIS — M77.50 TENDONITIS OF FOOT: Primary | ICD-10-CM

## 2019-09-09 DIAGNOSIS — I10 HTN, GOAL BELOW 140/90: ICD-10-CM

## 2019-09-09 RX ORDER — AMLODIPINE BESYLATE 5 MG/1
5 TABLET ORAL DAILY
Qty: 90 TABLET | Refills: 3 | Status: SHIPPED | OUTPATIENT
Start: 2019-09-09 | End: 2020-06-30 | Stop reason: SDUPTHER

## 2019-09-09 RX ORDER — LOSARTAN POTASSIUM 100 MG/1
100 TABLET ORAL DAILY
Qty: 90 TABLET | Refills: 3 | Status: SHIPPED | OUTPATIENT
Start: 2019-09-09 | End: 2020-06-30 | Stop reason: SDUPTHER

## 2019-09-09 NOTE — TELEPHONE ENCOUNTER
Patient called and stated that while she was taking the prednisone for her feet the pain was better. Now that she's off of it the pain is coming back again, especially in the right foot. Please advise. [employer] if needed. DOLV 8/19/19.

## 2019-09-23 ENCOUNTER — OFFICE VISIT (OUTPATIENT)
Dept: FAMILY MEDICINE CLINIC | Age: 74
End: 2019-09-23
Payer: MEDICARE

## 2019-09-23 VITALS
SYSTOLIC BLOOD PRESSURE: 142 MMHG | TEMPERATURE: 98.4 F | BODY MASS INDEX: 39.4 KG/M2 | HEIGHT: 64 IN | OXYGEN SATURATION: 94 % | DIASTOLIC BLOOD PRESSURE: 89 MMHG | WEIGHT: 230.8 LBS | RESPIRATION RATE: 16 BRPM | HEART RATE: 72 BPM

## 2019-09-23 DIAGNOSIS — J06.9 VIRAL UPPER RESPIRATORY TRACT INFECTION: Primary | ICD-10-CM

## 2019-09-23 PROCEDURE — 1090F PRES/ABSN URINE INCON ASSESS: CPT | Performed by: FAMILY MEDICINE

## 2019-09-23 PROCEDURE — 99213 OFFICE O/P EST LOW 20 MIN: CPT | Performed by: FAMILY MEDICINE

## 2019-09-23 PROCEDURE — G8427 DOCREV CUR MEDS BY ELIG CLIN: HCPCS | Performed by: FAMILY MEDICINE

## 2019-09-23 PROCEDURE — 1123F ACP DISCUSS/DSCN MKR DOCD: CPT | Performed by: FAMILY MEDICINE

## 2019-09-23 PROCEDURE — 4040F PNEUMOC VAC/ADMIN/RCVD: CPT | Performed by: FAMILY MEDICINE

## 2019-09-23 PROCEDURE — 3017F COLORECTAL CA SCREEN DOC REV: CPT | Performed by: FAMILY MEDICINE

## 2019-09-23 PROCEDURE — G8399 PT W/DXA RESULTS DOCUMENT: HCPCS | Performed by: FAMILY MEDICINE

## 2019-09-23 PROCEDURE — G8417 CALC BMI ABV UP PARAM F/U: HCPCS | Performed by: FAMILY MEDICINE

## 2019-09-23 PROCEDURE — 1036F TOBACCO NON-USER: CPT | Performed by: FAMILY MEDICINE

## 2019-09-23 PROCEDURE — G8598 ASA/ANTIPLAT THER USED: HCPCS | Performed by: FAMILY MEDICINE

## 2019-09-23 RX ORDER — GUAIFENESIN AND CODEINE PHOSPHATE 100; 10 MG/5ML; MG/5ML
5 SOLUTION ORAL EVERY 6 HOURS PRN
Qty: 120 ML | Refills: 0 | Status: SHIPPED | OUTPATIENT
Start: 2019-09-23 | End: 2019-09-30

## 2019-12-05 ENCOUNTER — OFFICE VISIT (OUTPATIENT)
Dept: SURGERY | Age: 74
End: 2019-12-05
Payer: MEDICARE

## 2019-12-05 VITALS
SYSTOLIC BLOOD PRESSURE: 138 MMHG | TEMPERATURE: 97.1 F | OXYGEN SATURATION: 92 % | BODY MASS INDEX: 42.71 KG/M2 | WEIGHT: 232.1 LBS | HEART RATE: 70 BPM | HEIGHT: 62 IN | DIASTOLIC BLOOD PRESSURE: 70 MMHG | RESPIRATION RATE: 18 BRPM

## 2019-12-05 DIAGNOSIS — Z12.31 SCREENING MAMMOGRAM, ENCOUNTER FOR: ICD-10-CM

## 2019-12-05 DIAGNOSIS — D05.12 DUCTAL CARCINOMA IN SITU (DCIS) OF LEFT BREAST: Primary | ICD-10-CM

## 2019-12-05 PROCEDURE — G8427 DOCREV CUR MEDS BY ELIG CLIN: HCPCS | Performed by: SURGERY

## 2019-12-05 PROCEDURE — G8482 FLU IMMUNIZE ORDER/ADMIN: HCPCS | Performed by: SURGERY

## 2019-12-05 PROCEDURE — G8399 PT W/DXA RESULTS DOCUMENT: HCPCS | Performed by: SURGERY

## 2019-12-05 PROCEDURE — 99213 OFFICE O/P EST LOW 20 MIN: CPT | Performed by: SURGERY

## 2019-12-05 PROCEDURE — 4040F PNEUMOC VAC/ADMIN/RCVD: CPT | Performed by: SURGERY

## 2019-12-05 PROCEDURE — 1090F PRES/ABSN URINE INCON ASSESS: CPT | Performed by: SURGERY

## 2019-12-05 PROCEDURE — G8598 ASA/ANTIPLAT THER USED: HCPCS | Performed by: SURGERY

## 2019-12-05 PROCEDURE — G8417 CALC BMI ABV UP PARAM F/U: HCPCS | Performed by: SURGERY

## 2019-12-05 PROCEDURE — 3017F COLORECTAL CA SCREEN DOC REV: CPT | Performed by: SURGERY

## 2019-12-05 PROCEDURE — 1123F ACP DISCUSS/DSCN MKR DOCD: CPT | Performed by: SURGERY

## 2019-12-05 PROCEDURE — 1036F TOBACCO NON-USER: CPT | Performed by: SURGERY

## 2019-12-05 ASSESSMENT — ENCOUNTER SYMPTOMS
EYE PAIN: 0
NAUSEA: 0
ABDOMINAL PAIN: 0
COUGH: 0
ABDOMINAL DISTENTION: 0
BLOOD IN STOOL: 0
SINUS PRESSURE: 0
CONSTIPATION: 0
BACK PAIN: 0
TROUBLE SWALLOWING: 0
SORE THROAT: 0

## 2019-12-08 ASSESSMENT — ENCOUNTER SYMPTOMS
CHEST TIGHTNESS: 0
EYE ITCHING: 0
SHORTNESS OF BREATH: 0

## 2020-01-02 ENCOUNTER — HOSPITAL ENCOUNTER (OUTPATIENT)
Dept: WOMENS IMAGING | Age: 75
Discharge: HOME | End: 2020-01-02
Payer: MEDICARE

## 2020-01-02 PROCEDURE — 77067 SCR MAMMO BI INCL CAD: CPT

## 2020-02-16 RX ORDER — SIMVASTATIN 20 MG
TABLET ORAL
Qty: 90 TABLET | Refills: 1 | Status: SHIPPED | OUTPATIENT
Start: 2020-02-16 | End: 2021-06-30 | Stop reason: SDUPTHER

## 2020-02-18 ENCOUNTER — NURSE ONLY (OUTPATIENT)
Dept: LAB | Age: 75
End: 2020-02-18

## 2020-02-18 ENCOUNTER — OFFICE VISIT (OUTPATIENT)
Dept: FAMILY MEDICINE CLINIC | Age: 75
End: 2020-02-18
Payer: MEDICARE

## 2020-02-18 VITALS
HEIGHT: 61 IN | RESPIRATION RATE: 12 BRPM | BODY MASS INDEX: 44.75 KG/M2 | SYSTOLIC BLOOD PRESSURE: 137 MMHG | WEIGHT: 237 LBS | TEMPERATURE: 97.8 F | HEART RATE: 80 BPM | DIASTOLIC BLOOD PRESSURE: 85 MMHG

## 2020-02-18 PROBLEM — E66.01 MORBID OBESITY WITH BMI OF 40.0-44.9, ADULT (HCC): Status: ACTIVE | Noted: 2020-02-18

## 2020-02-18 PROBLEM — I42.2 OTHER HYPERTROPHIC CARDIOMYOPATHY (HCC): Status: ACTIVE | Noted: 2020-02-18

## 2020-02-18 LAB
ANION GAP SERPL CALCULATED.3IONS-SCNC: 15 MEQ/L (ref 8–16)
AVERAGE GLUCOSE: 129 MG/DL (ref 70–126)
BUN BLDV-MCNC: 10 MG/DL (ref 7–22)
CALCIUM SERPL-MCNC: 9.7 MG/DL (ref 8.5–10.5)
CHLORIDE BLD-SCNC: 103 MEQ/L (ref 98–111)
CHOLESTEROL, TOTAL: 220 MG/DL (ref 100–199)
CO2: 22 MEQ/L (ref 23–33)
CREAT SERPL-MCNC: 0.6 MG/DL (ref 0.4–1.2)
GFR SERPL CREATININE-BSD FRML MDRD: > 90 ML/MIN/1.73M2
GLUCOSE BLD-MCNC: 122 MG/DL (ref 70–108)
HBA1C MFR BLD: 6.3 % (ref 4.4–6.4)
HDLC SERPL-MCNC: 57 MG/DL
LDL CHOLESTEROL CALCULATED: 120 MG/DL
POTASSIUM SERPL-SCNC: 4.1 MEQ/L (ref 3.5–5.2)
SODIUM BLD-SCNC: 140 MEQ/L (ref 135–145)
TRIGL SERPL-MCNC: 213 MG/DL (ref 0–199)

## 2020-02-18 PROCEDURE — G8427 DOCREV CUR MEDS BY ELIG CLIN: HCPCS | Performed by: FAMILY MEDICINE

## 2020-02-18 PROCEDURE — 99214 OFFICE O/P EST MOD 30 MIN: CPT | Performed by: FAMILY MEDICINE

## 2020-02-18 PROCEDURE — G8482 FLU IMMUNIZE ORDER/ADMIN: HCPCS | Performed by: FAMILY MEDICINE

## 2020-02-18 PROCEDURE — G8399 PT W/DXA RESULTS DOCUMENT: HCPCS | Performed by: FAMILY MEDICINE

## 2020-02-18 PROCEDURE — 1090F PRES/ABSN URINE INCON ASSESS: CPT | Performed by: FAMILY MEDICINE

## 2020-02-18 PROCEDURE — G8417 CALC BMI ABV UP PARAM F/U: HCPCS | Performed by: FAMILY MEDICINE

## 2020-02-18 PROCEDURE — 1123F ACP DISCUSS/DSCN MKR DOCD: CPT | Performed by: FAMILY MEDICINE

## 2020-02-18 PROCEDURE — 3017F COLORECTAL CA SCREEN DOC REV: CPT | Performed by: FAMILY MEDICINE

## 2020-02-18 PROCEDURE — 4040F PNEUMOC VAC/ADMIN/RCVD: CPT | Performed by: FAMILY MEDICINE

## 2020-02-18 PROCEDURE — 1036F TOBACCO NON-USER: CPT | Performed by: FAMILY MEDICINE

## 2020-02-18 RX ORDER — FLUTICASONE PROPIONATE 50 MCG
2 SPRAY, SUSPENSION (ML) NASAL DAILY
Qty: 1 BOTTLE | Refills: 0 | Status: SHIPPED | OUTPATIENT
Start: 2020-02-18 | End: 2020-06-30

## 2020-02-18 ASSESSMENT — PATIENT HEALTH QUESTIONNAIRE - PHQ9
SUM OF ALL RESPONSES TO PHQ QUESTIONS 1-9: 1
1. LITTLE INTEREST OR PLEASURE IN DOING THINGS: 1
2. FEELING DOWN, DEPRESSED OR HOPELESS: 0
SUM OF ALL RESPONSES TO PHQ QUESTIONS 1-9: 1
SUM OF ALL RESPONSES TO PHQ9 QUESTIONS 1 & 2: 1

## 2020-02-18 NOTE — PROGRESS NOTES
Jeffery Cervantes is a 69 y.o. female that presents for     Chief Complaint   Patient presents with    6 Month Follow-Up     wanting to discuss testing for kidney stone in 2017   [facility] Health Maintenance     saw testing due on My Chart wanting to discuss what she needs to complete    Tingling     tingling in back of neck and both hands for past 2 months     Nasal Congestion     waking in the night with phlegm in throat        /85   Pulse 80   Temp 97.8 °F (36.6 °C) (Oral)   Resp 12   Ht 5' 1\" (1.549 m)   Wt 237 lb (107.5 kg)   BMI 44.78 kg/m²       HPI:    States that she has a tingling sensation in the back of the neck and in her hands for the past 6+ months. Sx are intermittent. No pain associated with this. Worse with using her hands. Has a history of CTS bilaterally. Notes that she has a lot of nasal congestion with post nasal drainage. HTN    Does patient check BP regularly at home? - No  Current Medication regimen - metoprolol, losartan, norvasc  Tolerating medications well? - yes    Shortness of breath or chest pain? No  Headache or visual complaints? No  Neurologic changes like confusion? No  Extremity edema?  No    BP Readings from Last 3 Encounters:   02/18/20 137/85   12/05/19 138/70   09/23/19 (!) 142/89         Health Maintenance   Topic Date Due    Hepatitis C screen  1945    DTaP/Tdap/Td vaccine (1 - Tdap) 06/24/1958    Shingles Vaccine (2 of 3) 06/23/2014    A1C test (Diabetic or Prediabetic)  04/25/2018    Lipid screen  01/17/2020    Potassium monitoring  01/17/2020    Creatinine monitoring  01/17/2020    Annual Wellness Visit (AWV)  07/15/2020    Breast cancer screen  01/02/2021    Colon cancer screen colonoscopy  09/10/2029    DEXA (modify frequency per FRAX score)  Completed    Flu vaccine  Completed    Pneumococcal 65+ years Vaccine  Completed    Hepatitis A vaccine  Aged Out    Hepatitis B vaccine  Aged Out    Hib vaccine  Aged C/ Travis Robe 21 understanding.

## 2020-06-01 ENCOUNTER — TELEPHONE (OUTPATIENT)
Dept: FAMILY MEDICINE CLINIC | Age: 75
End: 2020-06-01

## 2020-06-01 NOTE — TELEPHONE ENCOUNTER
Pt called stating at her last office visit there was discussion about getting a referral to orthopedic for the carpal tunnel. At that time pt had stated she wanted to wait. Pt is ready to be referred to ortho. Please advise.

## 2020-06-08 RX ORDER — METOPROLOL SUCCINATE 25 MG/1
TABLET, EXTENDED RELEASE ORAL
Qty: 90 TABLET | Refills: 3 | Status: SHIPPED | OUTPATIENT
Start: 2020-06-08 | End: 2020-06-30 | Stop reason: SDUPTHER

## 2020-06-30 ENCOUNTER — OFFICE VISIT (OUTPATIENT)
Dept: CARDIOLOGY CLINIC | Age: 75
End: 2020-06-30
Payer: MEDICARE

## 2020-06-30 PROCEDURE — 99213 OFFICE O/P EST LOW 20 MIN: CPT | Performed by: NURSE PRACTITIONER

## 2020-06-30 PROCEDURE — G8417 CALC BMI ABV UP PARAM F/U: HCPCS | Performed by: NURSE PRACTITIONER

## 2020-06-30 PROCEDURE — 3017F COLORECTAL CA SCREEN DOC REV: CPT | Performed by: NURSE PRACTITIONER

## 2020-06-30 PROCEDURE — G8427 DOCREV CUR MEDS BY ELIG CLIN: HCPCS | Performed by: NURSE PRACTITIONER

## 2020-06-30 PROCEDURE — 93000 ELECTROCARDIOGRAM COMPLETE: CPT | Performed by: NURSE PRACTITIONER

## 2020-06-30 PROCEDURE — 1036F TOBACCO NON-USER: CPT | Performed by: NURSE PRACTITIONER

## 2020-06-30 PROCEDURE — 1090F PRES/ABSN URINE INCON ASSESS: CPT | Performed by: NURSE PRACTITIONER

## 2020-06-30 PROCEDURE — 4040F PNEUMOC VAC/ADMIN/RCVD: CPT | Performed by: NURSE PRACTITIONER

## 2020-06-30 PROCEDURE — G8399 PT W/DXA RESULTS DOCUMENT: HCPCS | Performed by: NURSE PRACTITIONER

## 2020-06-30 PROCEDURE — 1123F ACP DISCUSS/DSCN MKR DOCD: CPT | Performed by: NURSE PRACTITIONER

## 2020-06-30 RX ORDER — METOPROLOL SUCCINATE 25 MG/1
25 TABLET, EXTENDED RELEASE ORAL DAILY
Qty: 90 TABLET | Refills: 3 | Status: SHIPPED | OUTPATIENT
Start: 2020-06-30 | End: 2021-07-07 | Stop reason: SDUPTHER

## 2020-06-30 RX ORDER — AMLODIPINE BESYLATE 5 MG/1
5 TABLET ORAL DAILY
Qty: 90 TABLET | Refills: 3 | Status: SHIPPED | OUTPATIENT
Start: 2020-06-30 | End: 2021-07-07 | Stop reason: SDUPTHER

## 2020-06-30 RX ORDER — LOSARTAN POTASSIUM 100 MG/1
100 TABLET ORAL DAILY
Qty: 90 TABLET | Refills: 3 | Status: SHIPPED | OUTPATIENT
Start: 2020-06-30 | End: 2021-07-07 | Stop reason: SDUPTHER

## 2020-06-30 NOTE — PROGRESS NOTES
[facility] PROFESSIONAL SERVICES  [facility] [place] [id number] [facility]   [address] [address]   Dept: [phone]   Dept Fax: [phone]   Loc: [phone]      Chief Complaint   Patient presents with    Follow-up     Year F/U in 69 y/o female with history of HTN, HLP, preserved EF, known LBBB, and left sided mastectomy. Denies chest pain, palpitations, sob, YASMANI, lightheadedness, dizziness or syncope.      Cardiologist:  Dr. Taqueria Ruiz:   No fever, no chills, No fatigue or weight loss  Pulmonary:    No dyspnea, no wheezing  Cardiac:    Denies recent chest pain   GI:     No nausea or vomiting, no abdominal pain  Neuro:    No dizziness or light headedness  Musculoskeletal:  No recent active issues  Extremities:   No edema, good peripheral pulses      Past Medical History:   Diagnosis Date    Acid reflux     Arthritis     Back pain     Cancer ([address]     breast left    Constipation     Hyperlipidemia     Hypertension     Kidney cysts     Lung mass 2013    left lung no longer there (when they went to do biopsy it was gone)    Ulcer        Allergies   Allergen Reactions    Acrylic Polymer [Carbomer] Hives    Coal Tar Extract Itching    Crestor [Rosuvastatin Calcium]      Myalgias    Gabapentin      \"mouth thick and swollen\"    Lipitor [Atorvastatin] Other (See Comments)     Myalgia      Myrbetriq [Mirabegron]     Other Other (See Comments)     novacaine - headaches    Duloxetine Other (See Comments) and Nausea Only     Lightheaded, leg spasms, upset stomach    Tape Kelsie Gallus Tape] Rash       Current Outpatient Medications   Medication Sig Dispense Refill    amLODIPine (NORVASC) 5 MG tablet Take 1 tablet by mouth daily 90 tablet 3    losartan (COZAAR) 100 MG tablet Take 1 tablet by mouth daily 90 tablet 3    metoprolol succinate (TOPROL XL) 25 MG extended release tablet Take 1 tablet by mouth daily 90 tablet 3    simvastatin (ZOCOR) 20 MG tablet TAKE 1 TABLET EVERY EVENING 90 tablet 1    betamethasone valerate (VALISONE) 0.1 % cream Apply topically 2 times daily PRN. 1 Tube 1    Calcium Citrate-Vitamin D (CALCIUM CITRATE+D3 PO) Take by mouth daily      NONFORMULARY Flaxseed oil      Glucosamine-Chondroit-Vit C-Mn (GLUCOSAMINE CHONDR 1500 COMPLX PO) Take by mouth      NONFORMULARY Magnesium- Vit D3 - Turmeric  1 daily      NONFORMULARY Olive Leaf Extract 450 mg daily      NONFORMULARY Turmeric PO      omeprazole (PRILOSEC) 20 MG delayed release capsule Take 40 mg by mouth daily      oxybutynin (DITROPAN-XL) 10 MG extended release tablet Take 10 mg by mouth daily      vitamin B-12 (CYANOCOBALAMIN) 500 MCG tablet Take 5,000 mcg by mouth daily       Coenzyme Q10 (CO Q 10) 100 MG CAPS Take 200 mg by mouth daily      Lactobacillus (ULTIMATE PROBIOTIC FORMULA PO) Take by mouth daily      aspirin 81 MG tablet Take 81 mg by mouth daily      Multiple Vitamins-Minerals (MULTIVITAMIN PO) Take by mouth daily        No current facility-administered medications for this visit.         Social History     Socioeconomic History    Marital status:      Spouse name: Not on file    Number of children: 3    Years of education: Not on file    Highest education level: Not on file   Occupational History    Not on file   Social Needs    Financial resource strain: Not on file    Food insecurity     Worry: Not on file     Inability: Not on file   [employer] needs     Medical: Not on file     Non-medical: Not on file   Tobacco Use    Smoking status: Never Smoker    Smokeless tobacco: Never Used   Substance and Sexual Activity    Alcohol use: No    Drug use: No    Sexual activity: Not on file   Lifestyle    Physical activity     Days per week: Not on file     Minutes per session: Not on file    Stress: Not on file   Relationships    Social connections     Talks on phone: Not on file     Gets together: Not on file     Attends Adventism service: Not on file Active member of club or organization: Not on file     Attends meetings of clubs or organizations: Not on file     Relationship status: Not on file    Intimate partner violence     Fear of current or ex partner: Not on file     Emotionally abused: Not on file     Physically abused: Not on file     Forced sexual activity: Not on file   Other Topics Concern    Not on file   Social History Narrative    Not on file       Family History   Problem Relation Age of Onset    Stroke Mother     Other Mother         dementia    Osteoporosis Mother     Heart Disease Father     Cancer Sister         skin    High Blood Pressure Paternal Uncle     Stroke Maternal Grandmother     Other Maternal Grandmother         dementia    Diabetes Neg Hx        Blood pressure (!) 142/79, pulse 71, height 5' 1.5\" (1.562 m), weight 234 lb (106.1 kg), not currently breastfeeding. General:   Well developed, well nourished  Lungs:   Clear to auscultation  Heart:    Normal S1 S2, No murmur, rubs, or gallops  Abdomen:   Soft, non tender, no organomegalies, positive bowel sounds  Extremities:   No edema, no cyanosis, good peripheral pulses  Neurological:   Awake, alert, oriented. No obvious focal deficits  Musculoskeletal:  No obvious deformities    EKG: SR no acute abnormalities       Diagnosis Orders   1. SOB (shortness of breath)  EKG 12 Lead   2. HTN, goal below 140/90  losartan (COZAAR) 100 MG tablet       Orders Placed This Encounter   Procedures    EKG 12 Lead     Order Specific Question:   Reason for Exam?     Answer: Other   HTN  HLP  Lexiscan negative for ischemia  Preserved EF  Known LBBB  No chest pain or sob. No YASMANI. HR, BP controlled  Continue norvasc, asa, losartan, toprol, zocor   Follows with PCP for lipids, hepatic panel     Discussed use, benefit, and side effects of prescribed medications. All patient questions answered. Pt voiced understanding. Instructed to continue current medications, diet and exercise.  Continue risk factor modification and medical management. Patient agreed with treatment plan. Follow up as directed.     Continue Dr Khoa Lucero current treatment plan  Follow up with Dr Brittaney Emerson as scheduled or sooner if needed

## 2020-07-01 VITALS
WEIGHT: 234 LBS | DIASTOLIC BLOOD PRESSURE: 79 MMHG | HEART RATE: 71 BPM | HEIGHT: 62 IN | SYSTOLIC BLOOD PRESSURE: 142 MMHG | BODY MASS INDEX: 43.06 KG/M2

## 2020-07-10 ENCOUNTER — HOSPITAL ENCOUNTER (OUTPATIENT)
Dept: PHYSICAL THERAPY | Age: 75
Setting detail: THERAPIES SERIES
Discharge: HOME | End: 2020-07-10
Payer: MEDICARE

## 2020-07-10 PROCEDURE — 97162 PT EVAL MOD COMPLEX 30 MIN: CPT

## 2020-07-10 PROCEDURE — 97110 THERAPEUTIC EXERCISES: CPT

## 2020-07-10 NOTE — FLOWSHEET NOTE
** PLEASE SIGN, DATE AND TIME CERTIFICATION BELOW AND RETURN TO [facility] OUTPATIENT REHABILITATION (FAX #: [phone]). ATTEST/CO-SIGN IF ACCESSING VIA [employer]. THANK YOU.**    I certify that I have examined the patient below and determined that Physical Medicine and Rehabilitation service is necessary and that I approve the established plan of care for up to 90 days or as specifically noted. Attestation, signature or co-signature of physician indicates approval of certification requirements.    ________________________ ____________ __________  Physician Signature   Date   Time  [address] [facility]  PHYSICAL THERAPY  [x] EVALUATION  [] DAILY NOTE [] PROGRESS NOTE [] DISCHARGE NOTE    [x] [address] [facility]   [] [place] [address]    [] [address] [facility]   [] Luisa Yu    Date: 7/10/2020  Patient Name:  Jorge Orozco  : 1945  MRN: [id number]    Referring Practitioner Thomasine Riedel, PA-C   Diagnosis Spondylolisthesis, lumbar region [M43.16]    Treatment Diagnosis Back pain with standing and walking   Date of Evaluation 7/10/20    Additional Pertinent History Breast cancer with left mastectomy, HTN, back pain, shortness of breath, hypertrophic cardiomyopathy. Bundle branch block. Functional Outcome Measure Used Modified Oswestry   Functional Outcome Score 34%      Insurance: Payor: Kalpana Pacheco /  /  / , Merline Palin   Authorization Information: Unlimited visits based on medical necessity, no precertification, aquatic yes, modalities yes, iontophoresis, HP and CP are not covered. Visit # 1, 1/10 for progress note   Visits Allowed:    Recertification Date:    Physician Follow-Up:    PT isometrics and strengthening with modalities as needed 2-3x per week for 6 weeks. SUBJECTIVE: Notes pain at right lateral lumbar sacral region > left. Notes pain along right>left inguinal region as well. Sometimes aching along right lateral thigh. Standing, walking, grocery shopping, walking on unlevel ground grass/garden can flare the symptoms up. Note also with putting dishes into sink / rotating type movement can also flare her up. Notes sometimes when she goes up a step it feels like hip will give out. Descending steps she goes down backwards due to fear of leg going out from under her. Prefers to sit vs standing and walking. Sleeping usually on left side or sleeping in recliner chair. Medication: Ibuprofen as needed. Usually in pm before she goes to bed. Patient has been to chiropractor in October, November for 15 treatments for adjustments, TENS, US. Noted some ease of symptoms but not lasting. Social/Functional History and Current Status:  Medications and Allergies have been reviewed and are listed on Medical History Questionnaire. Jacqueline Borrero lives with spouse in a single story home with stairs and no handrail to enter. Task Previous Current   ADLs  Independent Independent   Ambulation Independent Independent   Transfers Modified Independent Modified Independent   Recreation Independent Independent   Community Integration Independent Independent   Driving Active  Active    Work Occupation: retired  Retired Retired enjoys baking, crafts, gardening, Ipad games     OBJECTIVE:    Pain: 2-3/10 sitting, 4/10 with walking into clinic area. Palpation Note tenderness at right lumbosacral region>left,    Observation Note mild gaurding with walking into clinic with decreased wt shift through RLE. Intermittent sharp pain lumbar with walking. Better in AM Worse as day goes on as reported by patient. Posture Note increased sacral angle in standing, accentuated lumbar lordosis. Note iliac crest level with equal wt shift through LEs. Note patient stands on lateral border of feet with slight supination. Moderate forward head and neck. Right shoulder lower than left. Patient right hand dominant.         Range of Motion Note 40% restriction in lumbar flexion 10-12 inches fingertips from floor. Hip flexion with SKTC right 100 degrees. Left 100 degrees, SLR right 45 degrees, left 60 degrees, hip ER/IR WNLS,     Strength Strength RLE/LLE WNLS 5/5. Core strength decreased with decreased ability to maintain optimal lumbar neutral decreased awarenss. Decreased core engagement. Sensation  tingling bilateral hands/fingers in AM. Intermittent through day. Sensation in LEs WNLS. Bed Mobility Modified independent. Transfers Modified independent use of armrests of chair to sit<>stand   Ambulation Patient ambulates with decreased wt shift through RLE. Slight forward flexed trunk           Special Tests Negative SLR test right/left. Movement testing: repeated no change in symptoms endrange pain with flexion and extension lumbar spine. TREATMENT   Precautions: History of cancer, no US, spondylolisthesis NO EXTENSION flexion responder. Pain: 2/10 right inguinal 0/10 back pain    X in shaded column indicates activity completed today   Modalities Parameters/  Location  Notes   HP with interferential estim Bilateral lumbar sacral  Head of bed inclined slightly 30 degrees. Manual Therapy Time/Technique  Notes   Manual therapy: muscle inhibition. Bilateral lumbar region,sacral                  Exercise/Intervention Sets/Sec  Notes   Abdominal isometric 1 5x x Exhale on contraction, inhale on relaxation of abdominal muscles. Marches (dead bugs)  1 10x  x Core engagement with only hip articulation. No movement of lumbar spine. Core work: hooklying                     Flexibility: SKCHUCK, Hemant KTAISHA       Hamstring stretch                     Nustep         Specific Interventions Next Treatment: HP with interferential estim bilateral lumbosacral region, manual therapy with massage muscle inhibition to bilateral thoracic/lumbar paraspinals. Core engagement and strengthening ex.  Flexibility with SKTC, Diag KTC, hamstring stretch. Nustep/     Activity/Treatment Tolerance:  [x]  Patient tolerated treatment well  []  Patient limited by fatigue  [x]  Patient limited by pain   []  Patient limited by medical complications  []  Other:     Assessment: Patient referred to PT with diagnosis of spondylolisthesis. Patient has increased sacral angle and accentuated compression and lordotic curve of lumbar spine. Note muscle tightness bilateral lumbar region along paraspinals. Note decreased core strength, LE proximal strength with decreased ease with transfers sit<>stand. Will follow 2x per week to address deficits. Body Structures/Functions/Activity Limitations: impaired activity tolerance, impaired ROM, impaired strength and pain  Prognosis: good    GOALS:  Patient Goal: Patient would like to have less back pain and be able to move better with tolerating standing and walking more. Short Term Goal Time Frame: 4 weeks  Short Term Goals:  1. Patient to report of decreased pain levels with standing and walking from 4-6/10 to no greater than 2/10.   2. Patient to demonstrate improved forward flexion with fingertips from 12 inches from floor to 6 inches fingertips from floor. 3. Patient to demonstrate increased hamstring flexibility with RLE 45 degrees to 60 degrees with increased ease with mobility. 4.Patient to demonstrate increase proximal LE strength as well as core strength with increased stability and strength at abdominals trunk muscles. .  Long Term Goal Time Frame: 8 weeks  Long Term Goals:  1. Patient to demonstrate knowledge of proper body mechanics in order to decrease stress on lumbar spine. 2. Oswestry 34% to 20%   3. Patient to demonstrate independent in HEP with decreased pain levels, improved functional mobility, and increased tolerance for standing and walking.      Patient mariel Hamlin (marches)    [x]  HEP/Education Completed: Plan of Care,   []  No new Education completed  []  Reviewed Prior HEP      []  Patient verbalized and/or demonstrated understanding of education provided. []  Patient unable to verbalize and/or demonstrate understanding of education provided. Will continue education. []  Barriers to learning: none    PLAN:  Treatment Recommendations: Strengthening, Range of Motion, Functional Mobility Training, Transfer Training, Endurance Training, Gait Training, Neuromuscular Re-education, Manual Therapy - Soft Tissue Mobilization, Pain Management, Home Exercise Program, Patient Education and Aquatics    [x]  Plan of care initiated. Plan to see patient 2x times per week for 8 weeks to address the treatment planned outlined above.   []  Continue with current plan of care  []  Modify plan of care as follows:    []  Hold pending physician visit  []  Discharge    Time In 1302   Time Out 1343       Timed Code Minutes: Minutes Units   ADL (98680)     Aquatics (84149)     Gait (97416)     Manual Therapy (46639)     Massage (19120)     Neuro (70777)     Th. Activities (23782)     Th. Exercise (17160) 15 1   Ultrasound (43592)     Ionto (46409)     Manual E-Stim (78314)          TOTAL SESSION TIME: 41 min       Electronically Signed by: Tamar Aviles

## 2020-07-14 ENCOUNTER — HOSPITAL ENCOUNTER (OUTPATIENT)
Dept: PHYSICAL THERAPY | Age: 75
Setting detail: THERAPIES SERIES
Discharge: HOME | End: 2020-07-14
Payer: MEDICARE

## 2020-07-14 PROCEDURE — 97110 THERAPEUTIC EXERCISES: CPT

## 2020-07-14 PROCEDURE — 97032 APPL MODALITY 1+ESTIM EA 15: CPT

## 2020-07-14 NOTE — FLOWSHEET NOTE
[address] [facility]  PHYSICAL THERAPY  [] EVALUATION  [x] DAILY NOTE [] PROGRESS NOTE [] DISCHARGE NOTE    [x] OUTPATIENT REHABILITATION CENTER [facility]   [] [place] [address]    [] [facility]   [] Ramona Day    Date: 2020  Patient Name:  Noemi Pringle  : 1945  MRN: [id number]    Referring Practitioner Lexii Delcid PA-C   Diagnosis Spondylolisthesis, lumbar region [M43.16]    Treatment Diagnosis Back pain with standing and walking   Date of Evaluation 7/10/20    Additional Pertinent History Breast cancer with left mastectomy, HTN, back pain, shortness of breath, hypertrophic cardiomyopathy. Bundle branch block. Functional Outcome Measure Used Modified Oswestry   Functional Outcome Score 34%      Insurance: Payor: Cali Barton /  /  / ,  Nikhil Garcia   Authorization Information: Unlimited visits based on medical necessity, no precertification, aquatic yes, modalities yes, iontophoresis, HP and CP are not covered. Visit # 2, 2/10 for progress note   Visits Allowed:    Recertification Date:    Physician Follow-Up:    PT isometrics and strengthening with modalities as needed 2-3x per week for 6 weeks. SUBJECTIVE: Reports having L hip pain today in addition to LB pain      TREATMENT   Precautions: History of cancer, no US, spondylolisthesis NO EXTENSION flexion responder. Pain: 2/10 right inguinal 0/10 back pain    X in shaded column indicates activity completed today   Modalities Parameters/  Location  Notes   HP with interferential estim Bilateral lumbar sacral x Head of bed inclined slightly 30 degrees. Manual Therapy Time/Technique  Notes   Manual therapy: muscle inhibition. Bilateral lumbar region,sacral                  Exercise/Intervention Sets/Sec  Notes   NuStep  X 5 min  x Load 5       .            Core work: hooklying:glute sets, pelvic tilts, abdominal sets  X 10 ea x    Marching  X 10 ea x    LTR   X 10 ea way x Flexibility: SKTC, Diag KTC 3 x 15 sec ea  x    Hamstring stretch 3 x 15 sec ea  x                           Specific Intervention manual therapy with massage muscle inhibition to bilateral thoracic; /lumbar paraspinals. Core engagement and strengthening ex. Activity/Treatment Tolerance:  [x]  Patient tolerated treatment well  []  Patient limited by fatigue  []  Patient limited by pain   []  Patient limited by medical complications  []  Other:     Assessment:Reports reduced pain after session    GOALS:  Patient Goal: Patient would like to have less back pain and be able to move better with tolerating standing and walking more. Short Term Goal Time Frame: 4 weeks  Short Term Goals:  1. Patient to report of decreased pain levels with standing and walking from 4-6/10 to no greater than 2/10.   2. Patient to demonstrate improved forward flexion with fingertips from 12 inches from floor to 6 inches fingertips from floor. 3. Patient to demonstrate increased hamstring flexibility with RLE 45 degrees to 60 degrees with increased ease with mobility. 4.Patient to demonstrate increase proximal LE strength as well as core strength with increased stability and strength at abdominals trunk muscles. .  Long Term Goal Time Frame: 8 weeks  Long Term Goals:  1. Patient to demonstrate knowledge of proper body mechanics in order to decrease stress on lumbar spine. 2. Oswestry 34% to 20%   3. Patient to demonstrate independent in HEP with decreased pain levels, improved functional mobility, and increased tolerance for standing and walking. Patient Loomismariel Tijerina ([facility])    [x]  HEP/Education Completed: Plan of Care,   []  No new Education completed  []  Reviewed Prior HEP      []  Patient verbalized and/or demonstrated understanding of education provided. []  Patient unable to verbalize and/or demonstrate understanding of education provided. Will continue education.   []  Barriers to learning: none    PLAN:  Treatment Recommendations: Strengthening, Range of Motion, Functional Mobility Training, Transfer Training, Endurance Training, Gait Training, Neuromuscular Re-education, Manual Therapy - Soft Tissue Mobilization, Pain Management, Home Exercise Program, Patient Education and Aquatics    []  Plan of care initiated. Plan to see patient 2x times per week for 8 weeks to address the treatment planned outlined above.   [x]  Continue with current plan of care  []  Modify plan of care as follows:    []  Hold pending physician visit  []  Discharge    Time In 1130   Time Out 1210       Timed Code Minutes: Minutes Units   ADL (45020)     Aquatics (32108)     Gait (62804)     Manual Therapy (05898)     Massage (04998)     Neuro (11788)     Th. Activities (66163)     Th. Exercise (23203) 25 2   Ultrasound (71998)     Ionto (59324)     Manual E-Stim (70444) 15 1        TOTAL SESSION TIME: 40 min       Electronically Signed by: Luis Enrique Russ

## 2020-07-16 ENCOUNTER — HOSPITAL ENCOUNTER (OUTPATIENT)
Dept: PHYSICAL THERAPY | Age: 75
Setting detail: THERAPIES SERIES
Discharge: HOME | End: 2020-07-16
Payer: MEDICARE

## 2020-07-16 PROCEDURE — 97110 THERAPEUTIC EXERCISES: CPT

## 2020-07-16 PROCEDURE — 97032 APPL MODALITY 1+ESTIM EA 15: CPT

## 2020-07-16 NOTE — FLOWSHEET NOTE
Flexibility: SKTC, Diag KTC 3 x 15 sec ea  x    Hamstring stretch 3 x 15 sec ea  x At steps                          Specific Intervention manual therapy with massage muscle inhibition to bilateral thoracic; /lumbar paraspinals. Core engagement and strengthening ex. Activity/Treatment Tolerance:  [x]  Patient tolerated treatment well  []  Patient limited by fatigue  []  Patient limited by pain   []  Patient limited by medical complications  []  Other:     Assessment:Reports reduced pain after session    GOALS:  Patient Goal: Patient would like to have less back pain and be able to move better with tolerating standing and walking more. Short Term Goal Time Frame: 4 weeks  Short Term Goals:  1. Patient to report of decreased pain levels with standing and walking from 4-6/10 to no greater than 2/10.   2. Patient to demonstrate improved forward flexion with fingertips from 12 inches from floor to 6 inches fingertips from floor. 3. Patient to demonstrate increased hamstring flexibility with RLE 45 degrees to 60 degrees with increased ease with mobility. 4.Patient to demonstrate increase proximal LE strength as well as core strength with increased stability and strength at abdominals trunk muscles. .  Long Term Goal Time Frame: 8 weeks  Long Term Goals:  1. Patient to demonstrate knowledge of proper body mechanics in order to decrease stress on lumbar spine. 2. Oswestry 34% to 20%   3. Patient to demonstrate independent in HEP with decreased pain levels, improved functional mobility, and increased tolerance for standing and walking. Patient mariel [place] (marches)    [x]  HEP/Education Completed: Plan of Care,   []  No new Education completed  []  Reviewed Prior HEP      []  Patient verbalized and/or demonstrated understanding of education provided. []  Patient unable to verbalize and/or demonstrate understanding of education provided. Will continue education.   []  Barriers to learning: none    PLAN:  Treatment Recommendations: Strengthening, Range of Motion, Functional Mobility Training, Transfer Training, Endurance Training, Gait Training, Neuromuscular Re-education, Manual Therapy - Soft Tissue Mobilization, Pain Management, Home Exercise Program, Patient Education and Aquatics    []  Plan of care initiated. Plan to see patient 2x times per week for 8 weeks to address the treatment planned outlined above.   [x]  Continue with current plan of care  []  Modify plan of care as follows:    []  Hold pending physician visit  []  Discharge    Time In 1130   Time Out 1210       Timed Code Minutes: Minutes Units   ADL (33257)     Aquatics (56134)     Gait (79480)     Manual Therapy (70482)     Massage (35125)     Neuro (30596)     Th. Activities (08784)     Th. Exercise (70522) 25 2   Ultrasound (55890)     Ionto (95800)     Manual E-Stim (01957) 15 1        TOTAL SESSION TIME: 40 min       Electronically Signed by: Naveed Gómez

## 2020-07-21 ENCOUNTER — HOSPITAL ENCOUNTER (OUTPATIENT)
Dept: PHYSICAL THERAPY | Age: 75
Setting detail: THERAPIES SERIES
Discharge: HOME | End: 2020-07-21
Payer: MEDICARE

## 2020-07-21 PROCEDURE — 97110 THERAPEUTIC EXERCISES: CPT

## 2020-07-21 NOTE — PROGRESS NOTES
[facility] [facility]  PHYSICAL THERAPY  [] EVALUATION  [x] DAILY NOTE [] PROGRESS NOTE [] DISCHARGE NOTE    [x] [facility] [place]   [] [place] [address]    [] [facility]   [] Shamika Ambrosio    Date: 2020  Patient Name:  Thao Dueñas  : 1945  MRN: [id number]    Referring Practitioner Geoff Barros PA-C   Diagnosis Spondylolisthesis, lumbar region [M43.16]    Treatment Diagnosis Back pain with standing and walking   Date of Evaluation 7/10/20    Additional Pertinent History Breast cancer with left mastectomy, HTN, back pain, shortness of breath, hypertrophic cardiomyopathy. Bundle branch block. Functional Outcome Measure Used Modified Oswestry   Functional Outcome Score 34%      Insurance: Payor: Kasia Rios /  /  / [employer]   Authorization Information: Unlimited visits based on medical necessity, no precertification, aquatic yes, modalities yes, iontophoresis, HP and CP are not covered. Visit # 4, 4/10 for progress note   Visits Allowed:    Recertification Date:    Physician Follow-Up:    PT isometrics and strengthening with modalities as needed 2-3x per week for 6 weeks. SUBJECTIVE: Patient reports of no change in therapy. Noting more symptoms at back following sessions and notes by end of day can hardly get around. TREATMENT   Precautions: History of cancer, no US, spondylolisthesis NO EXTENSION flexion responder. Pain:  3/10 back pain knee pain with turning /twisting 3/10     X in shaded column indicates activity completed today   Modalities Parameters/  Location  Notes   HP with interferential estim Bilateral lumbar sacral, intensity 12  Head of bed inclined slightly 30 degrees. Manual Therapy Time/Technique  Notes   Manual therapy: muscle inhibition.   Bilateral lumbar region,sacral                  Exercise/Intervention Sets/Sec  Notes   NuStep  X 5 min  x Load 3 with heat pack to lumbar spine.         .           Core work: hooklying:glute sets, pelvic tilts, abdominal sets  X 10 ea     Marching  X 10 ea     LTR   X 10 ea way     Flexibility: SKTC, Diag KTC 3 x 15 sec ea      Hamstring stretch 3 x 15 sec ea   At steps   Seated core work:    x HP to lumbar spine and 2 inch step under feet   Marches   x10 x    Hip adduction with ball   x10  x    Hip abducton with resistance band  x10 x Orange band   Seated shoulder flexion, abduction, horizontal abduction,  x10  x    Standing core work   x    Heelraises  x10 x    Marches   x10 x                         Seated forward flexion for pain management Hold 15 -30 seconds 5x  Lean of forearms on thighs. Specific Intervention manual therapy with massage muscle inhibition to bilateral thoracic; /lumbar paraspinals. Core engagement and strengthening ex. Discussed therap pool with patient. Activity/Treatment Tolerance:Limited session tolerance with modifications made due to left knee and back pain. [x]  Patient tolerated treatment well  []  Patient limited by fatigue  [x]  Patient limited by pain   []  Patient limited by medical complications  []  Other:     Assessment:Reports pain remained 2-3/10 without increased pain. Modification to seated stabilization ex this session and 2 standing stabilization ex tolerated only due to knee and back pain. Patient to call physician regarding knee pain. Discussed with patient she may benefit from therapeutic pool sessions to manage symptoms and progress in core work instead of land based visits due to pain at back and knee. GOALS:  Patient Goal: Patient would like to have less back pain and be able to move better with tolerating standing and walking more. Short Term Goal Time Frame: 4 weeks  Short Term Goals:  1.  Patient to report of decreased pain levels with standing and walking from 4-6/10 to no greater than 2/10.   2. Patient to demonstrate improved forward flexion with fingertips from 12

## 2020-07-21 NOTE — PROGRESS NOTES
[address] [facility]  PHYSICAL THERAPY  [] EVALUATION  [x] DAILY NOTE [] PROGRESS NOTE [] DISCHARGE NOTE    [x] [facility] [place]   [] [place] [address]    [] [facility]   [] Garett December    Date: 2020  Patient Name:  Kendrick Aguilera  : 1945  MRN: [id number]    Referring Practitioner Mateus Quiroz PA-C   Diagnosis Spondylolisthesis, lumbar region [M43.16]    Treatment Diagnosis Back pain with standing and walking   Date of Evaluation 7/10/20    Additional Pertinent History Breast cancer with left mastectomy, HTN, back pain, shortness of breath, hypertrophic cardiomyopathy. Bundle branch block. Functional Outcome Measure Used Modified Oswestry   Functional Outcome Score 34%      Insurance: Payor: Dutch Matos /  /  / [address]   Authorization Information: Unlimited visits based on medical necessity, no precertification, aquatic yes, modalities yes, iontophoresis, HP and CP are not covered. Visit # 4, 4/10 for progress note   Visits Allowed:    Recertification Date:    Physician Follow-Up:    PT isometrics and strengthening with modalities as needed 2-3x per week for 6 weeks. SUBJECTIVE: Patient reports of no change in therapy. Noting more symptoms at back following sessions and notes by end of day can hardly get around. TREATMENT   Precautions: History of cancer, no US, spondylolisthesis NO EXTENSION flexion responder. Pain:  3/10 back pain knee pain with turning /twisting 3/10     X in shaded column indicates activity completed today   Modalities Parameters/  Location  Notes   HP with interferential estim Bilateral lumbar sacral, intensity 12  Head of bed inclined slightly 30 degrees. Manual Therapy Time/Technique  Notes   Manual therapy: muscle inhibition.   Bilateral lumbar region,sacral                  Exercise/Intervention Sets/Sec  Notes   NuStep  X 5 min  x Load 3 with heat pack to lumbar spine.         .           Core work: hooklying:glute sets, pelvic tilts, abdominal sets  X 10 ea     Marching  X 10 ea     LTR   X 10 ea way     Flexibility: SKTC, Diag KTC 3 x 15 sec ea      Hamstring stretch 3 x 15 sec ea   At steps   Seated core work:     HP to lumbar spine and 2 inch step under feet   Marches   x10 x    Hip adduction with ball   x10  x    Hip abducton with resistance band  x10 x Orange band   Seated shoulder flexion, abduction, horizontal abduction,  x10  x    Standing core work       Heelraises  x10     Marches   x10                          Seated forward flexion for pain management Hold 15 -30 seconds 5x  Lean of forearms on thighs. Specific Intervention manual therapy with massage muscle inhibition to bilateral thoracic; /lumbar paraspinals. Core engagement and strengthening ex. Activity/Treatment Tolerance:  [x]  Patient tolerated treatment well  []  Patient limited by fatigue  [x]  Patient limited by pain   []  Patient limited by medical complications  []  Other:     Assessment:Reports reduced pain after session    GOALS:  Patient Goal: Patient would like to have less back pain and be able to move better with tolerating standing and walking more. Short Term Goal Time Frame: 4 weeks  Short Term Goals:  1. Patient to report of decreased pain levels with standing and walking from 4-6/10 to no greater than 2/10.   2. Patient to demonstrate improved forward flexion with fingertips from 12 inches from floor to 6 inches fingertips from floor. 3. Patient to demonstrate increased hamstring flexibility with RLE 45 degrees to 60 degrees with increased ease with mobility. 4.Patient to demonstrate increase proximal LE strength as well as core strength with increased stability and strength at abdominals trunk muscles. .  Long Term Goal Time Frame: 8 weeks  Long Term Goals:  1.  Patient to demonstrate knowledge of proper body mechanics in order to decrease stress on lumbar

## 2020-07-24 ENCOUNTER — APPOINTMENT (OUTPATIENT)
Dept: PHYSICAL THERAPY | Age: 75
End: 2020-07-24
Payer: MEDICARE

## 2020-07-24 NOTE — PROGRESS NOTES
[facility]  Therapy Contact Note      Date: 2020  Patient Name: Alfreda Sandoval        MRN: [id number]    Account Number: [de-identified]  : 1945  (70 y.o.)  Gender: female           Patient contacted PT on 2020 and would like to hold PT this week till follow up with physician. 2 appointments for this week taken off schedule. Patient will resume PT next week depending on follow up with physician.     Christian Squibb, [address]

## 2020-07-27 ENCOUNTER — APPOINTMENT (OUTPATIENT)
Dept: PHYSICAL THERAPY | Age: 75
End: 2020-07-27
Payer: MEDICARE

## 2020-07-31 ENCOUNTER — HOSPITAL ENCOUNTER (OUTPATIENT)
Dept: PHYSICAL THERAPY | Age: 75
Setting detail: THERAPIES SERIES
Discharge: HOME | End: 2020-07-31
Payer: MEDICARE

## 2020-07-31 NOTE — DISCHARGE SUMMARY
[facility]    Patient Name: Chen Burden        CSN: [id number]   YOB: 1945  Gender: female          Patient is discharged from Physical Therapy services at this time. See last note for details related to results of therapy and goal achievement. Reason for discharge: Patient placed on hold till follow up with physician. Patient contacted PT on 7/31/2020 and patient to be discharged from PT due to torn meniscus at knee.  Obie Smith, [address]

## 2021-04-16 ENCOUNTER — HOSPITAL ENCOUNTER (OUTPATIENT)
Dept: MRI IMAGING | Age: 76
Discharge: HOME | End: 2021-04-16

## 2021-04-16 DIAGNOSIS — Z00.6 EXAMINATION FOR NORMAL COMPARISON OR CONTROL IN CLINICAL RESEARCH: ICD-10-CM

## 2021-04-21 ENCOUNTER — HOSPITAL ENCOUNTER (OUTPATIENT)
Dept: WOMENS IMAGING | Age: 76
Discharge: HOME | End: 2021-04-21
Payer: MEDICARE

## 2021-04-21 DIAGNOSIS — Z12.31 VISIT FOR SCREENING MAMMOGRAM: ICD-10-CM

## 2021-04-21 PROCEDURE — 77065 DX MAMMO INCL CAD UNI: CPT

## 2021-04-21 PROCEDURE — 77063 BREAST TOMOSYNTHESIS BI: CPT

## 2021-04-27 ENCOUNTER — HOSPITAL ENCOUNTER (OUTPATIENT)
Dept: WOMENS IMAGING | Age: 76
Discharge: HOME | End: 2021-04-27
Payer: MEDICARE

## 2021-04-27 DIAGNOSIS — Z78.0 POSTMENOPAUSAL: ICD-10-CM

## 2021-04-27 DIAGNOSIS — M85.80 OSTEOPENIA, UNSPECIFIED LOCATION: ICD-10-CM

## 2021-04-27 PROCEDURE — 77080 DXA BONE DENSITY AXIAL: CPT

## 2021-06-07 ENCOUNTER — TELEPHONE (OUTPATIENT)
Dept: FAMILY MEDICINE CLINIC | Age: 76
End: 2021-06-07

## 2021-06-07 DIAGNOSIS — E78.5 HYPERLIPIDEMIA, UNSPECIFIED HYPERLIPIDEMIA TYPE: ICD-10-CM

## 2021-06-07 DIAGNOSIS — E66.9 OBESITY (BMI 35.0-39.9 WITHOUT COMORBIDITY): ICD-10-CM

## 2021-06-07 DIAGNOSIS — I10 HTN, GOAL BELOW 140/90: Primary | ICD-10-CM

## 2021-06-07 NOTE — TELEPHONE ENCOUNTER
Future Appointments   Date Time Provider Lesley Andreina   6/22/2021  1:00 PM DO MARIO Farias [facility] [place]   6/30/2021  2:15 PM [facility]         Appointment scheduled as above.

## 2021-06-07 NOTE — TELEPHONE ENCOUNTER
----- Message from Kita Enamorado sent at 6/7/2021 11:53 AM EDT -----  Subject: Appointment Request    Reason for Call: Routine Physical Exam    QUESTIONS  Type of Appointment? Established Patient  Reason for appointment request? Other - Message saying Beatrice Russ does not book   for this appointment  Additional Information for Provider? Patient wanting to book an   appointment for a full check up w/ some bloodwork to go over A1C, calcium   and cholesterol. 15th or 30th would not be a good time. ---------------------------------------------------------------------------  --------------  Katheryn BESS  What is the best way for the office to contact you? OK to leave message on   voicemail  Preferred Call Back Phone Number? [phone]  ---------------------------------------------------------------------------  --------------  SCRIPT ANSWERS  Relationship to Patient? Self  Appointment reason? Well Care/Follow Ups  Select a Well Care/Follow Ups appointment reason? Adult Physical Exam   [Medicare Annual Wellness, AWV, PAP, Pelvic]  (If the patient has Medicare as their primary insurance coverage ask this   question) Are you requesting a Medicare Annual Wellness Visit? No  (Is the patient requesting a pap smear with their physical exam?)? No  (Is the patient requesting their annual physical and does not need PAP or   AWV per above?)? Yes   Have you been diagnosed with, awaiting test results for, or told that you   are suspected of having COVID-19 (Coronavirus)? (If patient has tested   negative or was tested as a requirement for work, school, or travel and   not based on symptoms, answer no)? No  Do you currently have flu-like symptoms including fever or chills, cough,   shortness of breath, difficulty breathing, or new loss of taste or smell? No  Have you had close contact with someone with COVID-19 in the last 14 days? No  (Service Expert  click yes below to proceed with [email or web address] As Usual   Scheduling)?  Yes

## 2021-06-07 NOTE — TELEPHONE ENCOUNTER
----- Message from Manjeet Siddiqi sent at 6/7/2021 11:55 AM EDT -----  Subject: Referral Request    QUESTIONS   Reason for referral request? Wanting to check cholesterol, a1c and calcium     Has the physician seen you for this condition before? No   Preferred Specialist (if applicable)? Do you already have an appointment scheduled? No  Additional Information for Provider? Wanting the calcium results by the   15th.  ---------------------------------------------------------------------------  --------------  CALL BACK INFO  What is the best way for the office to contact you? Do not leave any   message, patient will call back for answer  Preferred Call Back Phone Number?  [phone]

## 2021-06-09 ENCOUNTER — HOSPITAL ENCOUNTER (OUTPATIENT)
Age: 76
Discharge: HOME | End: 2021-06-09
Payer: MEDICARE

## 2021-06-09 LAB
ALBUMIN SERPL-MCNC: 4.1 G/DL (ref 3.5–5.1)
ALP BLD-CCNC: 98 U/L (ref 38–126)
ALT SERPL-CCNC: 49 U/L (ref 11–66)
ANION GAP SERPL CALCULATED.3IONS-SCNC: 10 MEQ/L (ref 8–16)
AST SERPL-CCNC: 60 U/L (ref 5–40)
AVERAGE GLUCOSE: 135 MG/DL (ref 70–126)
BASOPHILS # BLD: 1.1 %
BASOPHILS ABSOLUTE: 0.1 THOU/MM3 (ref 0–0.1)
BILIRUB SERPL-MCNC: 0.6 MG/DL (ref 0.3–1.2)
BUN BLDV-MCNC: 10 MG/DL (ref 7–22)
CALCIUM SERPL-MCNC: 9.8 MG/DL (ref 8.5–10.5)
CHLORIDE BLD-SCNC: 103 MEQ/L (ref 98–111)
CHOLESTEROL, TOTAL: 199 MG/DL (ref 100–199)
CO2: 25 MEQ/L (ref 23–33)
CREAT SERPL-MCNC: 0.5 MG/DL (ref 0.4–1.2)
EOSINOPHIL # BLD: 3.6 %
EOSINOPHILS ABSOLUTE: 0.2 THOU/MM3 (ref 0–0.4)
ERYTHROCYTE [DISTWIDTH] IN BLOOD BY AUTOMATED COUNT: 14 % (ref 11.5–14.5)
ERYTHROCYTE [DISTWIDTH] IN BLOOD BY AUTOMATED COUNT: 47.5 FL (ref 35–45)
GFR SERPL CREATININE-BSD FRML MDRD: > 90 ML/MIN/1.73M2
GLUCOSE BLD-MCNC: 113 MG/DL (ref 70–108)
HBA1C MFR BLD: 6.5 % (ref 4.4–6.4)
HCT VFR BLD CALC: 44.7 % (ref 37–47)
HDLC SERPL-MCNC: 62 MG/DL
HEMOGLOBIN: 13.9 GM/DL (ref 12–16)
IMMATURE GRANS (ABS): 0.01 THOU/MM3 (ref 0–0.07)
IMMATURE GRANULOCYTES: 0.2 %
LDL CHOLESTEROL CALCULATED: 105 MG/DL
LYMPHOCYTES # BLD: 35.5 %
LYMPHOCYTES ABSOLUTE: 2.2 THOU/MM3 (ref 1–4.8)
MCH RBC QN AUTO: 28.6 PG (ref 26–33)
MCHC RBC AUTO-ENTMCNC: 31.1 GM/DL (ref 32.2–35.5)
MCV RBC AUTO: 92 FL (ref 81–99)
MONOCYTES # BLD: 7.7 %
MONOCYTES ABSOLUTE: 0.5 THOU/MM3 (ref 0.4–1.3)
NUCLEATED RED BLOOD CELLS: 0 /100 WBC
PLATELET # BLD: 150 THOU/MM3 (ref 130–400)
PMV BLD AUTO: 10.6 FL (ref 9.4–12.4)
POTASSIUM SERPL-SCNC: 3.8 MEQ/L (ref 3.5–5.2)
RBC # BLD: 4.86 MILL/MM3 (ref 4.2–5.4)
SEG NEUTROPHILS: 51.9 %
SEGMENTED NEUTROPHILS ABSOLUTE COUNT: 3.2 THOU/MM3 (ref 1.8–7.7)
SODIUM BLD-SCNC: 138 MEQ/L (ref 135–145)
TOTAL PROTEIN: 7.1 G/DL (ref 6.1–8)
TRIGL SERPL-MCNC: 159 MG/DL (ref 0–199)
WBC # BLD: 6.1 THOU/MM3 (ref 4.8–10.8)

## 2021-06-09 PROCEDURE — 36415 COLL VENOUS BLD VENIPUNCTURE: CPT

## 2021-06-09 PROCEDURE — 80053 COMPREHEN METABOLIC PANEL: CPT

## 2021-06-09 PROCEDURE — 80061 LIPID PANEL: CPT

## 2021-06-09 PROCEDURE — 83036 HEMOGLOBIN GLYCOSYLATED A1C: CPT

## 2021-06-09 PROCEDURE — 85025 COMPLETE CBC W/AUTO DIFF WBC: CPT

## 2021-06-22 ENCOUNTER — OFFICE VISIT (OUTPATIENT)
Dept: FAMILY MEDICINE CLINIC | Age: 76
End: 2021-06-22
Payer: MEDICARE

## 2021-06-22 VITALS
TEMPERATURE: 96.7 F | HEART RATE: 80 BPM | BODY MASS INDEX: 44.72 KG/M2 | SYSTOLIC BLOOD PRESSURE: 124 MMHG | HEIGHT: 62 IN | OXYGEN SATURATION: 96 % | DIASTOLIC BLOOD PRESSURE: 82 MMHG | RESPIRATION RATE: 16 BRPM | WEIGHT: 243 LBS

## 2021-06-22 DIAGNOSIS — E78.5 HYPERLIPIDEMIA, UNSPECIFIED HYPERLIPIDEMIA TYPE: ICD-10-CM

## 2021-06-22 DIAGNOSIS — E11.9 DIET-CONTROLLED TYPE 2 DIABETES MELLITUS (HCC): ICD-10-CM

## 2021-06-22 DIAGNOSIS — I10 HTN, GOAL BELOW 140/90: Primary | ICD-10-CM

## 2021-06-22 DIAGNOSIS — G56.03 BILATERAL CARPAL TUNNEL SYNDROME: ICD-10-CM

## 2021-06-22 PROBLEM — I42.2 OTHER HYPERTROPHIC CARDIOMYOPATHY (HCC): Status: RESOLVED | Noted: 2020-02-18 | Resolved: 2021-06-22

## 2021-06-22 PROCEDURE — G8399 PT W/DXA RESULTS DOCUMENT: HCPCS | Performed by: FAMILY MEDICINE

## 2021-06-22 PROCEDURE — 3044F HG A1C LEVEL LT 7.0%: CPT | Performed by: FAMILY MEDICINE

## 2021-06-22 PROCEDURE — G8427 DOCREV CUR MEDS BY ELIG CLIN: HCPCS | Performed by: FAMILY MEDICINE

## 2021-06-22 PROCEDURE — G8417 CALC BMI ABV UP PARAM F/U: HCPCS | Performed by: FAMILY MEDICINE

## 2021-06-22 PROCEDURE — 4040F PNEUMOC VAC/ADMIN/RCVD: CPT | Performed by: FAMILY MEDICINE

## 2021-06-22 PROCEDURE — 1123F ACP DISCUSS/DSCN MKR DOCD: CPT | Performed by: FAMILY MEDICINE

## 2021-06-22 PROCEDURE — 2022F DILAT RTA XM EVC RTNOPTHY: CPT | Performed by: FAMILY MEDICINE

## 2021-06-22 PROCEDURE — 1036F TOBACCO NON-USER: CPT | Performed by: FAMILY MEDICINE

## 2021-06-22 PROCEDURE — 99214 OFFICE O/P EST MOD 30 MIN: CPT | Performed by: FAMILY MEDICINE

## 2021-06-22 PROCEDURE — 3017F COLORECTAL CA SCREEN DOC REV: CPT | Performed by: FAMILY MEDICINE

## 2021-06-22 PROCEDURE — 1090F PRES/ABSN URINE INCON ASSESS: CPT | Performed by: FAMILY MEDICINE

## 2021-06-22 RX ORDER — OMEPRAZOLE 20 MG/1
20 CAPSULE, DELAYED RELEASE ORAL DAILY
Qty: 90 CAPSULE | Refills: 3 | Status: SHIPPED | OUTPATIENT
Start: 2021-06-22 | End: 2021-06-30 | Stop reason: SDUPTHER

## 2021-06-22 SDOH — ECONOMIC STABILITY: FOOD INSECURITY: WITHIN THE PAST 12 MONTHS, THE FOOD YOU BOUGHT JUST DIDN'T LAST AND YOU DIDN'T HAVE MONEY TO GET MORE.: NEVER TRUE

## 2021-06-22 SDOH — ECONOMIC STABILITY: FOOD INSECURITY: WITHIN THE PAST 12 MONTHS, YOU WORRIED THAT YOUR FOOD WOULD RUN OUT BEFORE YOU GOT MONEY TO BUY MORE.: NEVER TRUE

## 2021-06-22 ASSESSMENT — PATIENT HEALTH QUESTIONNAIRE - PHQ9
1. LITTLE INTEREST OR PLEASURE IN DOING THINGS: 0
SUM OF ALL RESPONSES TO PHQ9 QUESTIONS 1 & 2: 0
SUM OF ALL RESPONSES TO PHQ QUESTIONS 1-9: 0
2. FEELING DOWN, DEPRESSED OR HOPELESS: 0
DEPRESSION UNABLE TO ASSESS: FUNCTIONAL CAPACITY MOTIVATION LIMITS ACCURACY

## 2021-06-22 ASSESSMENT — SOCIAL DETERMINANTS OF HEALTH (SDOH): HOW HARD IS IT FOR YOU TO PAY FOR THE VERY BASICS LIKE FOOD, HOUSING, MEDICAL CARE, AND HEATING?: NOT VERY HARD

## 2021-06-22 NOTE — PROGRESS NOTES
Jas  is a 70 y.o. female that presents for     Chief Complaint   Patient presents with    Follow-up     fatigue mild headache     Discuss Labs     tingling in hands       /82   Pulse 80   Temp 96.7 °F (35.9 °C)   Resp 16   Ht 5' 1.5\" (1.562 m)   Wt 243 lb (110.2 kg)   SpO2 96%   BMI 45.17 kg/m²       HPI    Notes that she is still having CTS symptoms. Tends to notice more sx at night. A1c 6.5 on recent labs. No previous hx of DM2. No hx of DM2 in 2st degree relatives. Weight has been steadily increasing. HTN    Does patient check BP regularly at home? - No  Current Medication regimen - Norvasc, losartan, metoprolol  Tolerating medications well? - yes    Shortness of breath or chest pain? No  Headache or visual complaints? No  Neurologic changes like confusion? No  Extremity edema?  No    BP Readings from Last 3 Encounters:   06/22/21 124/82   06/30/20 (!) 142/79   02/18/20 137/85         Health Maintenance   Topic Date Due    Hepatitis C screen  Never done    Diabetic foot exam  Never done    Diabetic retinal exam  Never done    Diabetic microalbuminuria test  Never done    DTaP/Tdap/Td vaccine (1 - Tdap) Never done    Shingles Vaccine (2 of 3) 06/23/2014    Annual Wellness Visit (AWV)  Never done    Breast cancer screen  04/21/2022    A1C test (Diabetic or Prediabetic)  06/09/2022    Lipid screen  06/09/2022    Potassium monitoring  06/09/2022    Creatinine monitoring  06/09/2022    Colon cancer screen colonoscopy  09/10/2029    DEXA (modify frequency per FRAX score)  Completed    Flu vaccine  Completed    Pneumococcal 65+ years Vaccine  Completed    COVID-19 Vaccine  Completed    Hepatitis A vaccine  Aged Out    Hepatitis B vaccine  Aged Out    Hib vaccine  Aged Out    Meningococcal (ACWY) vaccine  Aged Out       Past Medical History:   Diagnosis Date    Acid reflux     Arthritis     Back pain     Breast cancer ([address] 2014    left    Cancer ([address] breast left    Constipation     Hyperlipidemia     Hypertension     Kidney cysts     Lung mass 2013    left lung no longer there (when they went to do biopsy it was gone)    Ulcer        Past Surgical History:   Procedure Laterality Date    APPENDECTOMY  11/20/2014    Dr. Darlene Salazar Left 12/18/14    Left Simple Mastectomy with [place] Lymph Node Biopsy - Dr. Leon Hwang  23/06/7303    incarcertated herina repair    TUBAL LIGATION      UPPER GASTROINTESTINAL ENDOSCOPY      URETHRA SURGERY      stretched       Social History     Tobacco Use    Smoking status: Never Smoker    Smokeless tobacco: Never Used   Vaping Use    Vaping Use: Never used   Substance Use Topics    Alcohol use: No    Drug use: No       Family History   Problem Relation Age of Onset    Stroke Mother     Other Mother         dementia    Osteoporosis Mother     Heart Disease Father     Cancer Sister         skin    High Blood Pressure Paternal Uncle     Stroke Maternal Grandmother     Other Maternal Grandmother         dementia    Diabetes Neg Hx          I have reviewed the patient's past medical history, past surgical history, allergies, medications, social and family history and I have made updates where appropriate. Review of Systems        PHYSICAL EXAM:  /82   Pulse 80   Temp 96.7 °F (35.9 °C)   Resp 16   Ht 5' 1.5\" (1.562 m)   Wt 243 lb (110.2 kg)   SpO2 96%   BMI 45.17 kg/m²     Physical Exam  Vitals and nursing note reviewed. Constitutional:       General: She is not in acute distress. Appearance: She is well-developed. HENT:      Head: Normocephalic and atraumatic. Right Ear: Hearing and external ear normal.      Left Ear: Hearing and external ear normal.      Nose: Nose normal.   Eyes:      General:         Right eye: No discharge. Left eye: No discharge. Conjunctiva/sclera: Conjunctivae normal.   Neck:      Thyroid: No thyromegaly. Trachea: No tracheal deviation. Cardiovascular:      Rate and Rhythm: Normal rate and regular rhythm. Heart sounds: Normal heart sounds. No murmur heard. No friction rub. No gallop. Pulmonary:      Effort: Pulmonary effort is normal. No respiratory distress. Breath sounds: No wheezing or rales. Chest:      Chest wall: No tenderness. Musculoskeletal:         General: No deformity. Cervical back: Normal range of motion and neck supple. Lymphadenopathy:      Cervical: No cervical adenopathy. Skin:     General: Skin is warm and dry. Findings: No erythema or rash. Neurological:      Mental Status: She is alert. Motor: No abnormal muscle tone. Coordination: Coordination normal.   Psychiatric:         Behavior: Behavior normal.         Thought Content: Thought content normal.         Judgment: Judgment normal.             ASSESSMENT & PLAN  Jacqueline Wagoner was seen today for follow-up and discuss labs. Diagnoses and all orders for this visit:    HTN, goal below 140/90    Diet-controlled type 2 diabetes mellitus ([facility]    Bilateral carpal tunnel syndrome    Hyperlipidemia, unspecified hyperlipidemia type    -Discussed diet for DM2, will work on reducing processed carbs  -Wants to hold on EMG or ortho referral for CTS, advised on conservative care  -Recheck 6 months for a1c  -Continue losartan, Norvasc, metoprolol for HTN, has been well controlled    Return in about 6 months (around 12/22/2021). The most recent results of the following tests were reviewed with the patient today: A1c, CBC, CMP and Lipid Panel     All copied or forwarded information in the progress note was verified by me to be accurate at the time of visit  Patient's past medical, surgical, social and family history were reviewed and updated     All patient questions answered. Patient voiced understanding.

## 2021-06-30 ENCOUNTER — OFFICE VISIT (OUTPATIENT)
Dept: CARDIOLOGY CLINIC | Age: 76
End: 2021-06-30
Payer: MEDICARE

## 2021-06-30 VITALS
DIASTOLIC BLOOD PRESSURE: 74 MMHG | WEIGHT: 239.4 LBS | HEART RATE: 82 BPM | SYSTOLIC BLOOD PRESSURE: 138 MMHG | BODY MASS INDEX: 45.2 KG/M2 | HEIGHT: 61 IN

## 2021-06-30 DIAGNOSIS — I10 HTN, GOAL BELOW 140/90: Primary | ICD-10-CM

## 2021-06-30 PROCEDURE — 4040F PNEUMOC VAC/ADMIN/RCVD: CPT | Performed by: INTERNAL MEDICINE

## 2021-06-30 PROCEDURE — G8427 DOCREV CUR MEDS BY ELIG CLIN: HCPCS | Performed by: INTERNAL MEDICINE

## 2021-06-30 PROCEDURE — G8417 CALC BMI ABV UP PARAM F/U: HCPCS | Performed by: INTERNAL MEDICINE

## 2021-06-30 PROCEDURE — 3017F COLORECTAL CA SCREEN DOC REV: CPT | Performed by: INTERNAL MEDICINE

## 2021-06-30 PROCEDURE — 93000 ELECTROCARDIOGRAM COMPLETE: CPT | Performed by: INTERNAL MEDICINE

## 2021-06-30 PROCEDURE — G8399 PT W/DXA RESULTS DOCUMENT: HCPCS | Performed by: INTERNAL MEDICINE

## 2021-06-30 PROCEDURE — 1090F PRES/ABSN URINE INCON ASSESS: CPT | Performed by: INTERNAL MEDICINE

## 2021-06-30 PROCEDURE — 1123F ACP DISCUSS/DSCN MKR DOCD: CPT | Performed by: INTERNAL MEDICINE

## 2021-06-30 PROCEDURE — 1036F TOBACCO NON-USER: CPT | Performed by: INTERNAL MEDICINE

## 2021-06-30 PROCEDURE — 99213 OFFICE O/P EST LOW 20 MIN: CPT | Performed by: INTERNAL MEDICINE

## 2021-06-30 NOTE — PROGRESS NOTES
[id number] [facility] [place] [address]  [place] [address]  Dept: [phone]  Dept Fax: [phone]  Loc: [phone]    Visit Date: 6/30/2021    Ms. Franklin Shah is a 78 y.o. female  who presented for:  Chief Complaint   Patient presents with    1 Year Follow Up    Hypertension    Check-Up       HPI:   Ramin Quintana is a 78 y.o. female with PMHx of HTN, HLD, and Left masectomy who is here for 1 year follow-up. She has no complaints today and stated that she has been having difficulty ambulating secondary to right hip pain. She is seeking care through a chiropractor. She is also having carpal tunnel syndrome symptoms at night. She follows with her PCP for these complaints. She denies cough, chest pain, shortness of breath at rest, ROJAS, orthopnea, PND, palpitations, or lower extremity edema.       Current Outpatient Medications:     omeprazole (PRILOSEC) 20 MG delayed release capsule, Take 1 capsule by mouth daily, Disp: 90 capsule, Rfl: 3    amLODIPine (NORVASC) 5 MG tablet, Take 1 tablet by mouth daily, Disp: 90 tablet, Rfl: 3    losartan (COZAAR) 100 MG tablet, Take 1 tablet by mouth daily, Disp: 90 tablet, Rfl: 3    metoprolol succinate (TOPROL XL) 25 MG extended release tablet, Take 1 tablet by mouth daily, Disp: 90 tablet, Rfl: 3    simvastatin (ZOCOR) 20 MG tablet, TAKE 1 TABLET EVERY EVENING, Disp: 90 tablet, Rfl: 1    betamethasone valerate (VALISONE) 0.1 % cream, Apply topically 2 times daily PRN., Disp: 1 Tube, Rfl: 1    Calcium Citrate-Vitamin D (CALCIUM CITRATE+D3 PO), Take by mouth daily, Disp: , Rfl:     Glucosamine-Chondroit-Vit C-Mn (GLUCOSAMINE CHONDR 1500 COMPLX PO), Take by mouth, Disp: , Rfl:     NONFORMULARY, Magnesium- Vit D3 - Turmeric  1 daily, Disp: , Rfl:     NONFORMULARY, Olive Leaf Extract 450 mg daily, Disp: , Rfl:     NONFORMULARY, Turmeric PO, Disp: , Rfl:     vitamin B-12 (CYANOCOBALAMIN) 500 MCG tablet, Take 5,000 mcg by mouth daily , Disp: , Rfl:     Coenzyme Q10 (CO Q 10) 100 MG CAPS, Take 200 mg by mouth daily, Disp: , Rfl:     aspirin 81 MG tablet, Take 81 mg by mouth daily, Disp: , Rfl:     Multiple Vitamins-Minerals (MULTIVITAMIN PO), Take by mouth daily , Disp: , Rfl:     Lactobacillus (ULTIMATE PROBIOTIC FORMULA PO), Take by mouth daily, Disp: , Rfl:     Past Medical History  Dennie Chow  has a past medical history of Acid reflux, Arthritis, Back pain, Breast cancer ([facility] [address] Cancer ([facility] [address] Constipation, Hyperlipidemia, Hypertension, Kidney cysts, Lung mass, and Ulcer. Social History  Dennie Chow  reports that she has never smoked. She has never used smokeless tobacco. She reports that she does not drink alcohol and does not use drugs. Family History  Dennie Chow family history includes Cancer in her sister; Heart Disease in her father; High Blood Pressure in her paternal uncle; Osteoporosis in her mother; Other in her maternal grandmother and mother; Stroke in her maternal grandmother and mother. There is no family history of bicuspid aortic valve, aneurysms, heart transplant, pacemakers, defibrillators, or sudden cardiac death. Past Surgical History   Past Surgical History:   Procedure Laterality Date    APPENDECTOMY  11/20/2014    Dr. Gardenia Ornelas Left 12/18/14    Left Simple Mastectomy with [place] Lymph Node Biopsy - Dr. Kidd [facility]  71/86/6769    incarcertated herina repair    TUBAL LIGATION      UPPER GASTROINTESTINAL ENDOSCOPY      URETHRA SURGERY      stretched       Review of Systems   Constitutional: Negative for chills and fever  HENT: Negative for congestion, sinus pressure, sneezing and sore throat. Eyes: Negative for pain, discharge, redness and itching.    Respiratory: Negative for apnea, cough  Gastrointestinal: Negative for blood in stool, constipation, diarrhea   Endocrine: Negative for cold intolerance, heat intolerance, polydipsia. Genitourinary: Negative for dysuria, enuresis, flank pain and hematuria. Musculoskeletal: (+) for right hip pain   Neurological: Negative for headaches, (+) for numbness/tingling in hands at night  Psychiatric/Behavioral: Negative for agitation, confusion, decreased concentration and dysphoric mood. Objective:     /74 (Site: Right Upper Arm, Position: Sitting, Cuff Size: Large Adult)   Pulse 82   Ht 5' 1\" (1.549 m)   Wt 239 lb 6.4 oz (108.6 kg)   BMI 45.23 kg/m²     Wt Readings from Last 3 Encounters:   06/30/21 239 lb 6.4 oz (108.6 kg)   06/22/21 243 lb (110.2 kg)   06/30/20 234 lb (106.1 kg)     BP Readings from Last 3 Encounters:   06/30/21 138/74   06/22/21 124/82   06/30/20 (!) 142/79       Nursing note and vitals reviewed. Physical Exam   Constitutional: Oriented to person, place, and time. Appears well-developed and well-nourished. HENT:   Head: Normocephalic and atraumatic. Eyes: EOM are normal. Pupils are equal, round, and reactive to light. Neck: Normal range of motion. Neck supple. No JVD present. Cardiovascular: Normal rate, regular rhythm, normal heart sounds and intact distal pulses. No murmur heard. Pulmonary/Chest: Effort normal and breath sounds normal. No respiratory distress. No wheezes. No rales. Abdominal: Soft. Bowel sounds are normal. No distension. There is no tenderness. Musculoskeletal: Normal range of motion. No edema. Neurological: Alert and oriented to person, place, and time. No cranial nerve deficit. Coordination normal.   Skin: Skin is warm and dry. Psychiatric: Normal mood and affect.        Lab Results   Component Value Date    CKTOTAL 76 07/20/2018       Lab Results   Component Value Date    WBC 6.1 06/09/2021    RBC 4.86 06/09/2021    HGB 13.9 06/09/2021    HCT 44.7 06/09/2021    MCV 92.0 06/09/2021    MCH 28.6 06/09/2021    MCHC 31.1 06/09/2021    RDW 13.9 04/06/2018     visualization    Indications:Chest pain. Additional Medical History:Chest pain, Hyperlipidemia, Hypertension,  Abnormal stress, Breast cancer, Left mastectomy    Patient Status: Routine    Height: 61.81 inches Weight: 224.87 pounds BSA: 2 m^2 BMI: 41.38 kg/m^2    BP: 106/60 mmHg    Allergies  - See Epic. Conclusions    Summary  Ejection fraction was estimated at 50-55%. There were no regional left ventricular wall motion abnormalities and wall  thickness was within normal limits. Doppler parameters were consistent with abnormal left ventricular  relaxation (grade 1 diastolic dysfunction). There was trace aortic regurgitation. There was mild tricuspid regurgitation. IVC is within normal limits with normal respiratory phasic changes. Signature    ----------------------------------------------------------------  Electronically signed by Beverly Reese MD (Interpreting  physician) on 01/17/2019 at 04:57 PM  ----------------------------------------------------------------    Findings    Mitral Valve  The mitral valve structure was normal with normal leaflet separation. DOPPLER: The transmitral velocity was within the normal range with no  evidence for mitral stenosis. There was no evidence of mitral  regurgitation. Aortic Valve  The aortic valve was trileaflet with normal thickness and cuspal  separation. DOPPLER: Transaortic velocity was within the normal range with  no evidence of aortic stenosis. There was trace aortic regurgitation. Tricuspid Valve  The tricuspid valve structure was normal with normal leaflet separation. DOPPLER: There was no evidence of tricuspid stenosis. There was mild  tricuspid regurgitation. Pulmonic Valve  The pulmonic valve leaflets were not well seen. DOPPLER: The transpulmonic  velocity was within the normal range with mild regurgitation. Left Atrium  Left atrial size was normal.    Left Ventricle  Normal left ventricle size and systolic function.  Ejection fraction was  estimated at 50-55%. There were no regional left ventricular wall motion  abnormalities and wall thickness was within normal limits. Doppler parameters were consistent with abnormal left ventricular  relaxation (grade 1 diastolic dysfunction). Right Atrium  Right atrial size was normal.    Right Ventricle  The right ventricular size was normal with normal systolic function and  wall thickness. Pericardial Effusion  The pericardium was normal in appearance with no evidence of a pericardial  effusion. Pleural Effusion  No evidence of pleural effusion. Aorta / Great Vessels  IVC is within normal limits with normal respiratory phasic changes.     M-Mode/2D Measurements & Calculations    LV Diastolic    LV Systolic Dimension: 3  AV Cusp Separation: 2 cmLA  Dimension: 4.1  cm                        Dimension: 2.6 cmAO Root  cm              LV Volume Diastolic: 70.7 Dimension: 3 cmLA Area: 17.6  LV FS:26.8 %    ml                        cm^2  LV PW           LV Volume Systolic: 35 ml  Diastolic: 0.7  LV EDV/LV EDV Index: 74.2  cm              ml/37 m^2LV ESV/LV ESV  Septum          Index: 35 ml/18 m^2       RV Diastolic Dimension: 2.5 cm  Diastolic: 1 cm EF Calculated: 52.8 %  LA/Aorta: 0.87    LA volume/Index: 52.8 ml /26m^2    Doppler Measurements & Calculations    MV Peak E-Wave: 75.1 cm/s  AV Peak Velocity: 148 LVOT Peak Velocity: 118  MV Peak A-Wave: 96.1 cm/s  cm/s                  cm/s  MV E/A Ratio: 0.78         AV Peak Gradient:     LVOT Peak Gradient: 6  MV Peak Gradient: 2.26     8.76 mmHg             mmHg  mmHg  TV Peak E-Wave: 62.9 cm/s  MV Deceleration Time: 232                        TV Peak A-Wave: 61.6 cm/s  msec  AV P1/2t: 556 msec    TV Peak Gradient: 1.58  IVRT: 99 msec         mmHg  MV E' Septal Velocity: 7.8                       TR Velocity:271 cm/s  cm/s                                             TR Gradient:29.38 mmHg  MV A' Septal Velocity:     AV DVI (Vmax):0.8     PV Peak Velocity: 104  10.1 cm/s                                        cm/s  MV E' Lateral Velocity:                          PV Peak Gradient: 4.33  5.8 cm/s                                         mmHg  MV A' Lateral Velocity:  12.5 cm/s  E/E' septal: 9.63                                OR ED Velocity: 122 cm/s  E/E' lateral: 12.95    [email or web address] [id number]       Assessment/Plan   Primary HTN - Controlled. In office elevated 153/70 however repeat 138/74 on manual recheck  - Continue Norvasc, Toprol XL and Losartan  - Recommend home BP checks with goal of <140/90  - Educated on weight loss/diet/exercise and low salt diet; patient verbalized understanding and making lifestyle changes already. Compensated diastolic heart failure NYHA I - ECHO 1/2019 demonstrating EF=50-55% with abnormal left ventricular relaxation. Trace AR and mild TR. Non-obstructive CAD - mild disease of proximal LAD and RCA seen on cardiac catheterization 2/2016. 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 - Continue management as above    HLD - Noted. - Continue simvastatin    Obesity - Noted. BMI=45.23  - Patient educated on weight loss/diet/exercise/BP control. And lifestyle changes. She verbalized understand and is in agreement with plan. Disposition:  Follow-up as needed. Electronically signed by Yahir Roche DO on 6/30/2021 at 3:26 PM     Attending Supervising Physician's Attestation Statement  I performed a history and physical examination on the patient and discussed the management with the resident physician. I reviewed and agree with the findings and plan as documented in the resident's note.     Electronically signed by Manasa Sullivan MD on 7/7/21 at 5:26 PM EDT

## 2021-07-07 RX ORDER — LOSARTAN POTASSIUM 100 MG/1
100 TABLET ORAL DAILY
Qty: 90 TABLET | Refills: 3 | Status: SHIPPED | OUTPATIENT
Start: 2021-07-07 | End: 2022-06-23 | Stop reason: SDUPTHER

## 2021-07-07 RX ORDER — SIMVASTATIN 20 MG
TABLET ORAL
Qty: 90 TABLET | Refills: 3 | Status: SHIPPED | OUTPATIENT
Start: 2021-07-07 | End: 2022-09-12

## 2021-07-07 RX ORDER — OMEPRAZOLE 20 MG/1
20 CAPSULE, DELAYED RELEASE ORAL DAILY
Qty: 90 CAPSULE | Refills: 3 | Status: SHIPPED | OUTPATIENT
Start: 2021-07-07 | End: 2022-06-23 | Stop reason: SDUPTHER

## 2021-07-07 RX ORDER — METOPROLOL SUCCINATE 25 MG/1
25 TABLET, EXTENDED RELEASE ORAL DAILY
Qty: 90 TABLET | Refills: 3 | Status: SHIPPED | OUTPATIENT
Start: 2021-07-07 | End: 2022-06-23 | Stop reason: SDUPTHER

## 2021-07-07 RX ORDER — AMLODIPINE BESYLATE 5 MG/1
5 TABLET ORAL DAILY
Qty: 90 TABLET | Refills: 3 | Status: SHIPPED | OUTPATIENT
Start: 2021-07-07 | End: 2022-06-23 | Stop reason: SDUPTHER

## 2021-09-15 ENCOUNTER — HOSPITAL ENCOUNTER (OUTPATIENT)
Dept: ULTRASOUND IMAGING | Age: 76
Discharge: HOME | End: 2021-09-15
Payer: MEDICARE

## 2021-09-15 DIAGNOSIS — E66.9 OBESITY, UNSPECIFIED CLASSIFICATION, UNSPECIFIED OBESITY TYPE, UNSPECIFIED WHETHER SERIOUS COMORBIDITY PRESENT: ICD-10-CM

## 2021-09-15 DIAGNOSIS — K76.0 FATTY LIVER: ICD-10-CM

## 2021-09-15 PROCEDURE — 76705 ECHO EXAM OF ABDOMEN: CPT

## 2021-09-15 PROCEDURE — 93975 VASCULAR STUDY: CPT

## 2021-10-14 ENCOUNTER — TELEPHONE (OUTPATIENT)
Dept: FAMILY MEDICINE CLINIC | Age: 76
End: 2021-10-14

## 2021-10-14 NOTE — TELEPHONE ENCOUNTER
----- Message from Clintliz Munoz sent at 10/14/2021 11:11 AM EDT -----  Subject: Message to Provider    QUESTIONS  Information for Provider? Patient states she is starting to get a sore   throat down in gland area and she is wondering if doctor could give her   something for it because they are getting ready to go on vacation. Please   advise. Thanks  ---------------------------------------------------------------------------  --------------  [employer] INFO  What is the best way for the office to contact you? OK to leave message on   voicemail  Preferred Call Back Phone Number? [phone]  ---------------------------------------------------------------------------  --------------  SCRIPT ANSWERS  Relationship to Patient?  Self

## 2021-10-25 NOTE — TELEPHONE ENCOUNTER
Called pt and she states she is feeling better. She gargled and states that helped. She didn't want to schedule an appointment, stating it isn't needed.

## 2021-12-23 ENCOUNTER — OFFICE VISIT (OUTPATIENT)
Dept: FAMILY MEDICINE CLINIC | Age: 76
End: 2021-12-23
Payer: MEDICARE

## 2021-12-23 VITALS
HEIGHT: 61 IN | SYSTOLIC BLOOD PRESSURE: 128 MMHG | TEMPERATURE: 97.3 F | WEIGHT: 231.2 LBS | HEART RATE: 74 BPM | OXYGEN SATURATION: 95 % | DIASTOLIC BLOOD PRESSURE: 74 MMHG | RESPIRATION RATE: 16 BRPM | BODY MASS INDEX: 43.65 KG/M2

## 2021-12-23 DIAGNOSIS — Z00.00 ROUTINE GENERAL MEDICAL EXAMINATION AT A HEALTH CARE FACILITY: Primary | ICD-10-CM

## 2021-12-23 DIAGNOSIS — M25.551 RIGHT HIP PAIN: ICD-10-CM

## 2021-12-23 DIAGNOSIS — E11.9 DIET-CONTROLLED TYPE 2 DIABETES MELLITUS (HCC): ICD-10-CM

## 2021-12-23 DIAGNOSIS — I10 HTN, GOAL BELOW 140/90: ICD-10-CM

## 2021-12-23 LAB — HBA1C MFR BLD: 6.5 % (ref 4.3–5.7)

## 2021-12-23 PROCEDURE — 1090F PRES/ABSN URINE INCON ASSESS: CPT | Performed by: FAMILY MEDICINE

## 2021-12-23 PROCEDURE — G0439 PPPS, SUBSEQ VISIT: HCPCS | Performed by: FAMILY MEDICINE

## 2021-12-23 PROCEDURE — 3017F COLORECTAL CA SCREEN DOC REV: CPT | Performed by: FAMILY MEDICINE

## 2021-12-23 PROCEDURE — 1123F ACP DISCUSS/DSCN MKR DOCD: CPT | Performed by: FAMILY MEDICINE

## 2021-12-23 PROCEDURE — 2022F DILAT RTA XM EVC RTNOPTHY: CPT | Performed by: FAMILY MEDICINE

## 2021-12-23 PROCEDURE — G8482 FLU IMMUNIZE ORDER/ADMIN: HCPCS | Performed by: FAMILY MEDICINE

## 2021-12-23 PROCEDURE — G8417 CALC BMI ABV UP PARAM F/U: HCPCS | Performed by: FAMILY MEDICINE

## 2021-12-23 PROCEDURE — 99213 OFFICE O/P EST LOW 20 MIN: CPT | Performed by: FAMILY MEDICINE

## 2021-12-23 PROCEDURE — 4040F PNEUMOC VAC/ADMIN/RCVD: CPT | Performed by: FAMILY MEDICINE

## 2021-12-23 PROCEDURE — G8427 DOCREV CUR MEDS BY ELIG CLIN: HCPCS | Performed by: FAMILY MEDICINE

## 2021-12-23 PROCEDURE — 1036F TOBACCO NON-USER: CPT | Performed by: FAMILY MEDICINE

## 2021-12-23 PROCEDURE — G8399 PT W/DXA RESULTS DOCUMENT: HCPCS | Performed by: FAMILY MEDICINE

## 2021-12-23 ASSESSMENT — LIFESTYLE VARIABLES
HOW OFTEN DO YOU HAVE A DRINK CONTAINING ALCOHOL: NEVER
AUDIT-C TOTAL SCORE: INCOMPLETE
HOW OFTEN DO YOU HAVE A DRINK CONTAINING ALCOHOL: 0
AUDIT TOTAL SCORE: INCOMPLETE

## 2021-12-23 ASSESSMENT — PATIENT HEALTH QUESTIONNAIRE - PHQ9
SUM OF ALL RESPONSES TO PHQ9 QUESTIONS 1 & 2: 1
SUM OF ALL RESPONSES TO PHQ QUESTIONS 1-9: 1
1. LITTLE INTEREST OR PLEASURE IN DOING THINGS: 1
SUM OF ALL RESPONSES TO PHQ QUESTIONS 1-9: 1
2. FEELING DOWN, DEPRESSED OR HOPELESS: 0
SUM OF ALL RESPONSES TO PHQ QUESTIONS 1-9: 1

## 2021-12-23 NOTE — PROGRESS NOTES
Boaz Zazueta is a 78 y.o. female that presents for     Chief Complaint   Patient presents with    Medicare AWV    Hip Pain    Knee Pain       /74   Pulse 74   Temp 97.3 °F (36.3 °C) (Infrared)   Resp 16   Ht 5' 1\" (1.549 m)   Wt 231 lb 3.2 oz (104.9 kg)   SpO2 95%   BMI 43.68 kg/m²       HPI    Still having a lot of hip pain. Has seen ortho, they are unsure what is causing the pain. Now having knee pain as well. Hip will 'give out' at times and nearly lead to falls. A1c 6.5 on recent labs. HTN    Does patient check BP regularly at home? - No  Current Medication regimen - Norvasc, losartan, metoprolol  Tolerating medications well? - yes    Shortness of breath or chest pain? No  Headache or visual complaints? No  Neurologic changes like confusion? No  Extremity edema?  No    BP Readings from Last 3 Encounters:   12/23/21 128/74   06/30/21 138/74   06/22/21 124/82         Health Maintenance   Topic Date Due    Hepatitis C screen  Never done    COVID-19 Vaccine (1) Never done    Diabetic foot exam  Never done    Diabetic microalbuminuria test  Never done    Diabetic retinal exam  Never done    DTaP/Tdap/Td vaccine (1 - Tdap) Never done    Shingles Vaccine (2 of 3) 06/23/2014    Annual Wellness Visit (AWV)  07/15/2020    Breast cancer screen  04/21/2022    Lipid screen  06/09/2022    Potassium monitoring  09/13/2022    Creatinine monitoring  09/13/2022    A1C test (Diabetic or Prediabetic)  12/23/2022    Colon cancer screen colonoscopy  09/10/2029    DEXA (modify frequency per FRAX score)  Completed    Flu vaccine  Completed    Pneumococcal 65+ years Vaccine  Completed    Hepatitis A vaccine  Aged Out    Hib vaccine  Aged Out    Meningococcal (ACWY) vaccine  Aged Out       Past Medical History:   Diagnosis Date    Acid reflux     Arthritis     Back pain     Breast cancer ([address] 2014    left    Cancer ([address]     breast left    Constipation     Hyperlipidemia     Hypertension     Kidney cysts     Lung mass 2013    left lung no longer there (when they went to do biopsy it was gone)    Ulcer        Past Surgical History:   Procedure Laterality Date    APPENDECTOMY  11/20/2014    Dr. Reta Byers Left 12/18/14    Left Simple Mastectomy with [place] Lymph Node Biopsy - Dr. Mendiola Quant  05/86/0374    incarcertated herina repair    TUBAL LIGATION      UPPER GASTROINTESTINAL ENDOSCOPY      URETHRA SURGERY      stretched       Social History     Tobacco Use    Smoking status: Never Smoker    Smokeless tobacco: Never Used   Vaping Use    Vaping Use: Never used   Substance Use Topics    Alcohol use: No    Drug use: No       Family History   Problem Relation Age of Onset    Stroke Mother     Other Mother         dementia    Osteoporosis Mother     Heart Disease Father     Cancer Sister         skin    High Blood Pressure Paternal Uncle     Stroke Maternal Grandmother     Other Maternal Grandmother         dementia    Diabetes Neg Hx          I have reviewed the patient's past medical history, past surgical history, allergies, medications, social and family history and I have made updates where appropriate. Review of Systems        PHYSICAL EXAM:  /74   Pulse 74   Temp 97.3 °F (36.3 °C) (Infrared)   Resp 16   Ht 5' 1\" (1.549 m)   Wt 231 lb 3.2 oz (104.9 kg)   SpO2 95%   BMI 43.68 kg/m²     Physical Exam  Vitals and nursing note reviewed. Constitutional:       General: She is not in acute distress. Appearance: She is well-developed. HENT:      Head: Normocephalic and atraumatic. Right Ear: Hearing and external ear normal.      Left Ear: Hearing and external ear normal.      Nose: Nose normal.   Eyes:      General:         Right eye: No discharge. Left eye: No discharge.       Conjunctiva/sclera: Conjunctivae normal.   Neck: Thyroid: No thyromegaly. Trachea: No tracheal deviation. Cardiovascular:      Rate and Rhythm: Normal rate and regular rhythm. Heart sounds: Normal heart sounds. No murmur heard. No friction rub. No gallop. Pulmonary:      Effort: Pulmonary effort is normal. No respiratory distress. Breath sounds: No wheezing or rales. Chest:      Chest wall: No tenderness. Musculoskeletal:         General: No deformity. Cervical back: Normal range of motion and neck supple. Lymphadenopathy:      Cervical: No cervical adenopathy. Skin:     General: Skin is warm and dry. Findings: No erythema or rash. Neurological:      Mental Status: She is alert. Motor: No abnormal muscle tone. Coordination: Coordination normal.   Psychiatric:         Behavior: Behavior normal.         Thought Content: Thought content normal.         Judgment: Judgment normal.             ASSESSMENT & PLAN  Pratik Whitaker was seen today for medicare awv, hip pain and knee pain. Diagnoses and all orders for this visit:    Routine general medical examination at a health care facility    Diet-controlled type 2 diabetes mellitus ([facility] [address]  -     POCT glycosylated hemoglobin (Hb A1C)    HTN, goal below 140/90    Right hip pain  -     CT HIP RIGHT WO CONTRAST; Future    -Obtain CT of the hip to further evaluate, may consider 2nd opinion on hip pending results  -DM2 stable, continue to monitor  -Recheck 6 months for a1c  -Continue losartan, Norvasc, metoprolol for HTN, has been well controlled    Return in about 6 months (around 6/23/2022). The most recent results of the following tests were reviewed with the patient today: A1c, CBC, CMP and Lipid Panel     All copied or forwarded information in the progress note was verified by me to be accurate at the time of visit  Patient's past medical, surgical, social and family history were reviewed and updated     All patient questions answered. Patient voiced understanding.

## 2021-12-23 NOTE — PROGRESS NOTES
mg daily Yes Historical Provider, MD   NONFORMULARY Turmeric PO Yes Historical Provider, MD   vitamin B-12 (CYANOCOBALAMIN) 500 MCG tablet Take 5,000 mcg by mouth daily  Yes Historical Provider, MD   Coenzyme Q10 (CO Q 10) 100 MG CAPS Take 200 mg by mouth daily Yes Historical Provider, MD   Lactobacillus (ULTIMATE PROBIOTIC FORMULA PO) Take by mouth daily Yes Historical Provider, MD   aspirin 81 MG tablet Take 81 mg by mouth daily Yes Historical Provider, MD   Multiple Vitamins-Minerals (MULTIVITAMIN PO) Take by mouth daily  Yes Historical Provider, MD   betamethasone valerate (VALISONE) 0.1 % cream Apply topically 2 times daily PRN.   Patient not taking: Reported on 12/23/2021  Adolfo Alvarenga DO   rosuvastatin (CRESTOR) 10 MG tablet Take 1 tablet by mouth nightly  Adolfo Alvarenga DO         Past Medical History:   Diagnosis Date    Acid reflux     Arthritis     Back pain     Breast cancer ([facility] [address] 2014    left    Cancer ([facility] [address]     breast left    Constipation     Hyperlipidemia     Hypertension     Kidney cysts     Lung mass 2013    left lung no longer there (when they went to do biopsy it was gone)    Ulcer        Past Surgical History:   Procedure Laterality Date    APPENDECTOMY  11/20/2014    Dr. Flip Boggs Left 12/18/14    Left Simple Mastectomy with [place] Lymph Node Biopsy - Dr. Jordan Weeks  49/98/8847    incarcertated herina repair    TUBAL LIGATION      UPPER GASTROINTESTINAL ENDOSCOPY      URETHRA SURGERY      stretched         Family History   Problem Relation Age of Onset    Stroke Mother     Other Mother         dementia    Osteoporosis Mother     Heart Disease Father     Cancer Sister         skin    High Blood Pressure Paternal Uncle     Stroke Maternal Grandmother     Other Maternal Grandmother         dementia    Diabetes Neg Hx        CareTeam (Including outside providers/suppliers regularly involved in providing care):   Patient Care Team:  Daniele Hussein DO as PCP - General  Daniele Hussein DO as PCP - [facility] Empaneled Provider  TESS Montalvo - CNP as Nurse Practitioner (Nurse Practitioner)  Anh King MD as Consulting Physician (Pulmonology)    Wt Readings from Last 3 Encounters:   12/23/21 231 lb 3.2 oz (104.9 kg)   06/30/21 239 lb 6.4 oz (108.6 kg)   06/22/21 243 lb (110.2 kg)     Vitals:    12/23/21 1418   BP: 128/74   Pulse: 74   Resp: 16   Temp: 97.3 °F (36.3 °C)   TempSrc: Infrared   SpO2: 95%   Weight: 231 lb 3.2 oz (104.9 kg)   Height: 5' 1\" (1.549 m)     Body mass index is 43.68 kg/m². Based upon direct observation of the patient, evaluation of cognition reveals recent and remote memory intact. Patient's complete Health Risk Assessment and screening values have been reviewed and are found in Flowsheets. The following problems were reviewed today and where indicated follow up appointments were made and/or referrals ordered. Positive Risk Factor Screenings with Interventions:              General Health and ACP:  General  In general, how would you say your health is?: Good  In the past 7 days, have you experienced any of the following?  New or Increased Pain, New or Increased Fatigue, Loneliness, Social Isolation, Stress or Anger?: (!) New or Increased Fatigue  Do you get the social and emotional support that you need?: Yes  Do you have a Living Will?: Yes  Advance Directives     Power of  Living Will ACP-Advance Directive ACP-Power of     Not on File Filed on 11/21/14 Filed Not on File        Health Habits/Nutrition:  Health Habits/Nutrition  Do you exercise for at least 20 minutes 2-3 times per week?: (!) No  Have you lost any weight without trying in the past 3 months?: No  Do you eat only one meal per day?: No  Have you seen the dentist within the past year?: Yes  Body mass index: (!) 43.68  Health Habits/Nutrition Interventions:  · encouraged on activity as tolerated      ADL:  ADLs  In the past 7 days, did you need help from others to perform any of the following everyday activities? Eating, dressing, grooming, bathing, toileting, or walking/balance?: None  In the past 7 days, did you need help from others to take care of any of the following? Laundry, housekeeping, banking/finances, shopping, telephone use, food preparation, transportation, or taking medications?: (!) Laundry  Decline any assistance for this. Personalized Preventive Plan   Current Health Maintenance Status  Immunization History   Administered Date(s) Administered    Influenza Virus Vaccine 11/08/2014, 10/27/2015    Influenza, High Dose (Fluzone 65 yrs and older) 12/02/2019, 09/16/2020, 10/06/2021    Influenza, Quadv, IM, PF (6 mo and older Fluzone, Flulaval, Fluarix, and 3 yrs and older Afluria) 09/27/2018    Pneumococcal Conjugate 13-valent ([id number]) 08/03/2015    Pneumococcal Polysaccharide ([id number]) 10/16/2013        Health Maintenance   Topic Date Due    Hepatitis C screen  Never done    COVID-19 Vaccine (1) Never done    Diabetic foot exam  Never done    Diabetic microalbuminuria test  Never done    Diabetic retinal exam  Never done    DTaP/Tdap/Td vaccine (1 - Tdap) Never done    Shingles Vaccine (2 of 3) 06/23/2014    Annual Wellness Visit (AWV)  07/15/2020    Breast cancer screen  04/21/2022    Lipid screen  06/09/2022    Potassium monitoring  09/13/2022    Creatinine monitoring  09/13/2022    A1C test (Diabetic or Prediabetic)  12/23/2022    Colon cancer screen colonoscopy  09/10/2029    DEXA (modify frequency per FRAX score)  Completed    Flu vaccine  Completed    Pneumococcal 65+ years Vaccine  Completed    Hepatitis A vaccine  Aged Out    Hib vaccine  Aged Out    Meningococcal (ACWY) vaccine  Aged Out     Recommendations for [employer] Due: see orders and patient instructions/AVS.  .   Recommended screening schedule for the next 5-10 years is provided to the patient in written form: see Patient Instructions/AVS.    Zabrina Kincaid was seen today for medicare awv, hip pain and knee pain.     Diagnoses and all orders for this visit:    Routine general medical examination at a health care facility    Other orders  -     POCT glycosylated hemoglobin (Hb A1C)

## 2022-01-12 ENCOUNTER — TELEPHONE (OUTPATIENT)
Dept: FAMILY MEDICINE CLINIC | Age: 77
End: 2022-01-12

## 2022-01-12 ENCOUNTER — HOSPITAL ENCOUNTER (OUTPATIENT)
Dept: CT IMAGING | Age: 77
Discharge: HOME | End: 2022-01-12
Payer: MEDICARE

## 2022-01-12 DIAGNOSIS — M25.551 RIGHT HIP PAIN: ICD-10-CM

## 2022-01-12 PROCEDURE — 73700 CT LOWER EXTREMITY W/O DYE: CPT

## 2022-01-12 NOTE — TELEPHONE ENCOUNTER
----- Message from Bhavesh Parmar DO sent at 1/12/2022  1:01 PM EST -----  The CT scan revealed arthritis changes, but otherwise looked ok. Does she want to see a different orthopedic doctor as we had discussed?

## 2022-01-12 NOTE — TELEPHONE ENCOUNTER
Let patient know her CT scan results. Patient stated she is going to hold off for now with seeing a different orthopedic doctor. She will contact us if she changes her mind.

## 2022-06-06 ENCOUNTER — HOSPITAL ENCOUNTER (OUTPATIENT)
Dept: WOMENS IMAGING | Age: 77
Discharge: HOME | End: 2022-06-06
Payer: MEDICARE

## 2022-06-06 DIAGNOSIS — Z12.31 VISIT FOR SCREENING MAMMOGRAM: ICD-10-CM

## 2022-06-06 PROCEDURE — 77063 BREAST TOMOSYNTHESIS BI: CPT

## 2022-06-23 ENCOUNTER — OFFICE VISIT (OUTPATIENT)
Dept: FAMILY MEDICINE CLINIC | Age: 77
End: 2022-06-23
Payer: MEDICARE

## 2022-06-23 VITALS
HEART RATE: 79 BPM | HEIGHT: 61 IN | BODY MASS INDEX: 46.11 KG/M2 | TEMPERATURE: 97.6 F | DIASTOLIC BLOOD PRESSURE: 76 MMHG | SYSTOLIC BLOOD PRESSURE: 134 MMHG | RESPIRATION RATE: 16 BRPM | WEIGHT: 244.2 LBS | OXYGEN SATURATION: 95 %

## 2022-06-23 DIAGNOSIS — E78.5 HYPERLIPIDEMIA, UNSPECIFIED HYPERLIPIDEMIA TYPE: ICD-10-CM

## 2022-06-23 DIAGNOSIS — E11.9 DIET-CONTROLLED TYPE 2 DIABETES MELLITUS (HCC): Primary | ICD-10-CM

## 2022-06-23 DIAGNOSIS — R39.15 URGENCY OF URINATION: ICD-10-CM

## 2022-06-23 DIAGNOSIS — K21.9 GASTROESOPHAGEAL REFLUX DISEASE WITHOUT ESOPHAGITIS: ICD-10-CM

## 2022-06-23 DIAGNOSIS — I10 HTN, GOAL BELOW 140/90: ICD-10-CM

## 2022-06-23 LAB — HBA1C MFR BLD: 6.7 % (ref 4.3–5.7)

## 2022-06-23 PROCEDURE — 99214 OFFICE O/P EST MOD 30 MIN: CPT | Performed by: FAMILY MEDICINE

## 2022-06-23 PROCEDURE — G8427 DOCREV CUR MEDS BY ELIG CLIN: HCPCS | Performed by: FAMILY MEDICINE

## 2022-06-23 PROCEDURE — 3017F COLORECTAL CA SCREEN DOC REV: CPT | Performed by: FAMILY MEDICINE

## 2022-06-23 PROCEDURE — 2022F DILAT RTA XM EVC RTNOPTHY: CPT | Performed by: FAMILY MEDICINE

## 2022-06-23 PROCEDURE — 1123F ACP DISCUSS/DSCN MKR DOCD: CPT | Performed by: FAMILY MEDICINE

## 2022-06-23 PROCEDURE — 1090F PRES/ABSN URINE INCON ASSESS: CPT | Performed by: FAMILY MEDICINE

## 2022-06-23 PROCEDURE — 3044F HG A1C LEVEL LT 7.0%: CPT | Performed by: FAMILY MEDICINE

## 2022-06-23 PROCEDURE — G8399 PT W/DXA RESULTS DOCUMENT: HCPCS | Performed by: FAMILY MEDICINE

## 2022-06-23 PROCEDURE — 1036F TOBACCO NON-USER: CPT | Performed by: FAMILY MEDICINE

## 2022-06-23 PROCEDURE — G8417 CALC BMI ABV UP PARAM F/U: HCPCS | Performed by: FAMILY MEDICINE

## 2022-06-23 RX ORDER — LOSARTAN POTASSIUM 100 MG/1
100 TABLET ORAL DAILY
Qty: 90 TABLET | Refills: 3 | Status: SHIPPED | OUTPATIENT
Start: 2022-06-23

## 2022-06-23 RX ORDER — METOPROLOL SUCCINATE 25 MG/1
25 TABLET, EXTENDED RELEASE ORAL DAILY
Qty: 90 TABLET | Refills: 3 | Status: SHIPPED | OUTPATIENT
Start: 2022-06-23

## 2022-06-23 RX ORDER — OMEPRAZOLE 20 MG/1
20 CAPSULE, DELAYED RELEASE ORAL DAILY
Qty: 90 CAPSULE | Refills: 3 | Status: SHIPPED | OUTPATIENT
Start: 2022-06-23

## 2022-06-23 RX ORDER — VITAMIN B COMPLEX
1 CAPSULE ORAL DAILY
COMMUNITY

## 2022-06-23 RX ORDER — SOLIFENACIN SUCCINATE 5 MG/1
10 TABLET, FILM COATED ORAL DAILY
Qty: 180 TABLET | Refills: 3 | Status: SHIPPED | OUTPATIENT
Start: 2022-06-23

## 2022-06-23 RX ORDER — SOLIFENACIN SUCCINATE 5 MG/1
10 TABLET, FILM COATED ORAL DAILY
COMMUNITY
End: 2022-06-23 | Stop reason: SDUPTHER

## 2022-06-23 RX ORDER — AMLODIPINE BESYLATE 5 MG/1
5 TABLET ORAL DAILY
Qty: 90 TABLET | Refills: 3 | Status: SHIPPED | OUTPATIENT
Start: 2022-06-23

## 2022-06-23 ASSESSMENT — PATIENT HEALTH QUESTIONNAIRE - PHQ9
SUM OF ALL RESPONSES TO PHQ QUESTIONS 1-9: 0
1. LITTLE INTEREST OR PLEASURE IN DOING THINGS: 0
SUM OF ALL RESPONSES TO PHQ9 QUESTIONS 1 & 2: 0
SUM OF ALL RESPONSES TO PHQ QUESTIONS 1-9: 0
2. FEELING DOWN, DEPRESSED OR HOPELESS: 0

## 2022-06-23 NOTE — PROGRESS NOTES
Mery Brady is a 78 y.o. female that presents for     Chief Complaint   Patient presents with    6 Month Follow-Up    Other     pain on the back of head    Headache       /76   Pulse 79   Temp 97.6 °F (36.4 °C) (Infrared)   Resp 16   Ht 5' 1\" (1.549 m)   Wt 244 lb 3.2 oz (110.8 kg)   SpO2 95%   BMI 46.14 kg/m²     Notes that she is having intermittent headaches in the occipital region. No paresthesias. Aea can feel a little tender. A1c 6.5 on recent labs. HTN    Does patient check BP regularly at home? - 'occasionally', state that when she checked this last the SBP was 150  Current Medication regimen - Norvasc, losartan, metoprolol  Tolerating medications well? - yes    Shortness of breath or chest pain? No  Headache or visual complaints? No  Neurologic changes like confusion? No  Extremity edema?  No    BP Readings from Last 3 Encounters:   06/23/22 134/76   12/23/21 128/74   06/30/21 138/74         Health Maintenance   Topic Date Due    Diabetic foot exam  Never done    Diabetic microalbuminuria test  Never done    Diabetic retinal exam  Never done    Hepatitis C screen  Never done    DTaP/Tdap/Td vaccine (1 - Tdap) Never done    Shingles vaccine (2 of 3) 06/23/2014    Lipids  06/09/2022    Depression Screen  12/23/2022    Annual Wellness Visit (AWV)  12/24/2022    Breast cancer screen  06/06/2023    A1C test (Diabetic or Prediabetic)  06/23/2023    Colorectal Cancer Screen  09/10/2029    DEXA (modify frequency per FRAX score)  Completed    Flu vaccine  Completed    Pneumococcal 65+ years Vaccine  Completed    COVID-19 Vaccine  Completed    Hepatitis A vaccine  Aged Out    Hib vaccine  Aged Out    Meningococcal (ACWY) vaccine  Aged Out       Past Medical History:   Diagnosis Date    Acid reflux     Arthritis     Back pain     Breast cancer ([address] 2014    left    Cancer ([address]     breast left    Constipation     Hyperlipidemia     Hypertension     Kidney cysts     Lung mass 2013    left lung no longer there (when they went to do biopsy it was gone)    Ulcer        Past Surgical History:   Procedure Laterality Date    APPENDECTOMY  11/20/2014    Dr. Cameron Perkins Left 12/18/14    Left Simple Mastectomy with [place] Lymph Node Biopsy - Dr. Haley Levels  88/68/7983    incarcertated herina repair    TUBAL LIGATION      UPPER GASTROINTESTINAL ENDOSCOPY      URETHRA SURGERY      stretched       Social History     Tobacco Use    Smoking status: Never Smoker    Smokeless tobacco: Never Used   Vaping Use    Vaping Use: Never used   Substance Use Topics    Alcohol use: No    Drug use: No       Family History   Problem Relation Age of Onset    Stroke Mother     Other Mother         dementia    Osteoporosis Mother     Heart Disease Father     Cancer Sister         skin    High Blood Pressure Paternal Uncle     Stroke Maternal Grandmother     Other Maternal Grandmother         dementia    Diabetes Neg Hx          I have reviewed the patient's past medical history, past surgical history, allergies, medications, social and family history and I have made updates where appropriate. Review of Systems   Neurological: Positive for headaches. PHYSICAL EXAM:  /76   Pulse 79   Temp 97.6 °F (36.4 °C) (Infrared)   Resp 16   Ht 5' 1\" (1.549 m)   Wt 244 lb 3.2 oz (110.8 kg)   SpO2 95%   BMI 46.14 kg/m²     Physical Exam  Vitals and nursing note reviewed. Constitutional:       General: She is not in acute distress. Appearance: She is well-developed. HENT:      Head: Normocephalic and atraumatic. Right Ear: Hearing and external ear normal.      Left Ear: Hearing and external ear normal.      Nose: Nose normal.   Eyes:      General:         Right eye: No discharge. Left eye: No discharge.       Conjunctiva/sclera: Conjunctivae normal.   Neck:      Thyroid: No thyromegaly. Trachea: No tracheal deviation. Cardiovascular:      Rate and Rhythm: Normal rate and regular rhythm. Heart sounds: Normal heart sounds. No murmur heard. No friction rub. No gallop. Pulmonary:      Effort: Pulmonary effort is normal. No respiratory distress. Breath sounds: No wheezing or rales. Chest:      Chest wall: No tenderness. Musculoskeletal:         General: No deformity. Cervical back: Normal range of motion and neck supple. Lymphadenopathy:      Cervical: No cervical adenopathy. Skin:     General: Skin is warm and dry. Findings: No erythema or rash. Neurological:      Mental Status: She is alert. Motor: No abnormal muscle tone. Coordination: Coordination normal.   Psychiatric:         Behavior: Behavior normal.         Thought Content: Thought content normal.         Judgment: Judgment normal.             ASSESSMENT & PLAN  Slim Tejeda was seen today for 6 month follow-up, other and headache. Diagnoses and all orders for this visit:    Diet-controlled type 2 diabetes mellitus (HCC)    HTN, goal below 140/90  -     amLODIPine (NORVASC) 5 MG tablet; Take 1 tablet by mouth daily  -     losartan (COZAAR) 100 MG tablet; Take 1 tablet by mouth daily  -     metoprolol succinate (TOPROL XL) 25 MG extended release tablet; Take 1 tablet by mouth daily    Hyperlipidemia, unspecified hyperlipidemia type    Gastroesophageal reflux disease without esophagitis  -     omeprazole (PRILOSEC) 20 MG delayed release capsule; Take 1 capsule by mouth daily    Urgency of urination  -     solifenacin (VESICARE) 5 MG tablet;  Take 2 tablets by mouth daily    Body mass index (BMI) 45.0-49.9, adult ([facility] [address]    Other orders  -     POCT glycosylated hemoglobin (Hb A1C)    -Wants to see optometry first for HAs, will let me know if these sx persist  -DM2 stable, continue to monitor  -Recheck 6 months for a1c  -Continue losartan, Norvasc, metoprolol for HTN, has been well controlled    Return in about 6 months (around 12/23/2022). The most recent results of the following tests were reviewed with the patient today: A1c    All copied or forwarded information in the progress note was verified by me to be accurate at the time of visit  Patient's past medical, surgical, social and family history were reviewed and updated     All patient questions answered. Patient voiced understanding.

## 2022-09-12 ENCOUNTER — OFFICE VISIT (OUTPATIENT)
Dept: FAMILY MEDICINE CLINIC | Age: 77
End: 2022-09-12
Payer: MEDICARE

## 2022-09-12 VITALS
OXYGEN SATURATION: 98 % | WEIGHT: 245.6 LBS | RESPIRATION RATE: 16 BRPM | BODY MASS INDEX: 45.19 KG/M2 | DIASTOLIC BLOOD PRESSURE: 82 MMHG | HEIGHT: 62 IN | HEART RATE: 87 BPM | TEMPERATURE: 97.8 F | SYSTOLIC BLOOD PRESSURE: 138 MMHG

## 2022-09-12 DIAGNOSIS — M25.552 BILATERAL HIP PAIN: ICD-10-CM

## 2022-09-12 DIAGNOSIS — E78.5 HYPERLIPIDEMIA, UNSPECIFIED HYPERLIPIDEMIA TYPE: Primary | ICD-10-CM

## 2022-09-12 DIAGNOSIS — M25.551 BILATERAL HIP PAIN: ICD-10-CM

## 2022-09-12 PROCEDURE — 1123F ACP DISCUSS/DSCN MKR DOCD: CPT | Performed by: FAMILY MEDICINE

## 2022-09-12 PROCEDURE — G8427 DOCREV CUR MEDS BY ELIG CLIN: HCPCS | Performed by: FAMILY MEDICINE

## 2022-09-12 PROCEDURE — 1036F TOBACCO NON-USER: CPT | Performed by: FAMILY MEDICINE

## 2022-09-12 PROCEDURE — 1090F PRES/ABSN URINE INCON ASSESS: CPT | Performed by: FAMILY MEDICINE

## 2022-09-12 PROCEDURE — 99213 OFFICE O/P EST LOW 20 MIN: CPT | Performed by: FAMILY MEDICINE

## 2022-09-12 PROCEDURE — 3017F COLORECTAL CA SCREEN DOC REV: CPT | Performed by: FAMILY MEDICINE

## 2022-09-12 PROCEDURE — G8399 PT W/DXA RESULTS DOCUMENT: HCPCS | Performed by: FAMILY MEDICINE

## 2022-09-12 PROCEDURE — G8417 CALC BMI ABV UP PARAM F/U: HCPCS | Performed by: FAMILY MEDICINE

## 2022-09-12 RX ORDER — MELOXICAM 7.5 MG/1
7.5 TABLET ORAL DAILY PRN
Qty: 30 TABLET | Refills: 3 | Status: SHIPPED | OUTPATIENT
Start: 2022-09-12

## 2022-09-12 RX ORDER — PREDNISONE 20 MG/1
40 TABLET ORAL DAILY
Qty: 14 TABLET | Refills: 0 | Status: SHIPPED | OUTPATIENT
Start: 2022-09-12 | End: 2022-09-19

## 2022-09-12 SDOH — ECONOMIC STABILITY: FOOD INSECURITY: WITHIN THE PAST 12 MONTHS, THE FOOD YOU BOUGHT JUST DIDN'T LAST AND YOU DIDN'T HAVE MONEY TO GET MORE.: NEVER TRUE

## 2022-09-12 SDOH — ECONOMIC STABILITY: FOOD INSECURITY: WITHIN THE PAST 12 MONTHS, YOU WORRIED THAT YOUR FOOD WOULD RUN OUT BEFORE YOU GOT MONEY TO BUY MORE.: NEVER TRUE

## 2022-09-12 ASSESSMENT — SOCIAL DETERMINANTS OF HEALTH (SDOH): HOW HARD IS IT FOR YOU TO PAY FOR THE VERY BASICS LIKE FOOD, HOUSING, MEDICAL CARE, AND HEATING?: NOT HARD AT ALL

## 2022-09-12 NOTE — PROGRESS NOTES
SUBJECTIVE:  Jim Ramos is a 78 y.o. y/o female that presents with Follow-up (Routine/)  . HPI:  Pain is present in the bilateral hips. Symptoms have been present for  1+ year , worse over the past 2 weeks. The pain is intermittent. The patient describes the pain as aching and sharp / stabbing. Inciting injury or history of trauma? No  Pain is aggravated by - walking, standing  Pain is relieved by - internal rotation  Radiation of the pain? No  Paresthesias of the extremities? No  Decreased ROM? Yes - at times  Edema? No  Treatments tried - NSAIDs        OBJECTIVE:  /82 (Site: Right Upper Arm, Position: Sitting, Cuff Size: Large Adult)   Pulse 87   Temp 97.8 °F (36.6 °C)   Resp 16   Ht 5' 1.5\" (1.562 m)   Wt 245 lb 9.6 oz (111.4 kg)   SpO2 98%   BMI 45.65 kg/m²   General appearance: alert, well appearing, and in no distress. Physical Exam  Vitals and nursing note reviewed. Constitutional:       General: She is not in acute distress. Appearance: She is well-developed and normal weight. She is not toxic-appearing or diaphoretic. HENT:      Head: Normocephalic and atraumatic. Right Ear: Hearing and external ear normal.      Left Ear: Hearing and external ear normal.      Nose: Nose normal.      Mouth/Throat:      Mouth: Mucous membranes are moist.      Dentition: Normal dentition. Eyes:      General: Lids are normal. No scleral icterus. Right eye: No discharge. Left eye: No discharge. Conjunctiva/sclera: Conjunctivae normal.   Neck:      Thyroid: No thyromegaly. Trachea: No tracheal deviation. Pulmonary:      Effort: Pulmonary effort is normal. No respiratory distress. Breath sounds: Normal breath sounds. Abdominal:      General: There is no distension. Palpations: Abdomen is soft. Tenderness: There is no guarding. Musculoskeletal:         General: No tenderness. Normal range of motion.       Cervical back: Normal range of motion. Right lower leg: No edema. Left lower leg: No edema. Skin:     General: Skin is dry. Findings: No erythema or rash. Neurological:      General: No focal deficit present. Mental Status: She is alert. Cranial Nerves: No cranial nerve deficit. Motor: No tremor or abnormal muscle tone. Coordination: Coordination normal.      Gait: Gait normal.   Psychiatric:         Attention and Perception: Attention normal.         Mood and Affect: Mood and affect normal.         Behavior: Behavior normal.         Thought Content: Thought content normal.         Judgment: Judgment normal.            ASSESSMENT & PLAN  Monik Powell was seen today for follow-up. Diagnoses and all orders for this visit:    Hyperlipidemia, unspecified hyperlipidemia type  -     Comprehensive Metabolic Panel; Future  -     Lipid Panel; Future    Bilateral hip pain  -     predniSONE (DELTASONE) 20 MG tablet; Take 2 tablets by mouth daily for 7 days  -     meloxicam (MOBIC) 7.5 MG tablet;  Take 1 tablet by mouth daily as needed for Pain  -     Handicap Placard MISC; by Does not apply route Expires 9/12/27      Return if symptoms worsen or fail to improve.    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

## 2022-09-28 ENCOUNTER — NURSE ONLY (OUTPATIENT)
Dept: LAB | Age: 77
End: 2022-09-28

## 2022-09-28 DIAGNOSIS — E78.5 HYPERLIPIDEMIA, UNSPECIFIED HYPERLIPIDEMIA TYPE: ICD-10-CM

## 2022-09-28 LAB
ALBUMIN SERPL-MCNC: 4 G/DL (ref 3.5–5.1)
ALP BLD-CCNC: 98 U/L (ref 38–126)
ALT SERPL-CCNC: 51 U/L (ref 11–66)
ANION GAP SERPL CALCULATED.3IONS-SCNC: 9 MEQ/L (ref 8–16)
AST SERPL-CCNC: 51 U/L (ref 5–40)
BILIRUB SERPL-MCNC: 0.5 MG/DL (ref 0.3–1.2)
BUN BLDV-MCNC: 12 MG/DL (ref 7–22)
CALCIUM SERPL-MCNC: 9.6 MG/DL (ref 8.5–10.5)
CHLORIDE BLD-SCNC: 103 MEQ/L (ref 98–111)
CHOLESTEROL, TOTAL: 315 MG/DL (ref 100–199)
CO2: 27 MEQ/L (ref 23–33)
CREAT SERPL-MCNC: 0.5 MG/DL (ref 0.4–1.2)
GFR SERPL CREATININE-BSD FRML MDRD: > 90 ML/MIN/1.73M2
GLUCOSE BLD-MCNC: 136 MG/DL (ref 70–108)
HDLC SERPL-MCNC: 57 MG/DL
LDL CHOLESTEROL CALCULATED: 215 MG/DL
POTASSIUM SERPL-SCNC: 4 MEQ/L (ref 3.5–5.2)
SODIUM BLD-SCNC: 139 MEQ/L (ref 135–145)
TOTAL PROTEIN: 7.3 G/DL (ref 6.1–8)
TRIGL SERPL-MCNC: 215 MG/DL (ref 0–199)

## 2022-09-29 ENCOUNTER — TELEPHONE (OUTPATIENT)
Dept: FAMILY MEDICINE CLINIC | Age: 77
End: 2022-09-29

## 2022-09-29 DIAGNOSIS — E78.5 HYPERLIPIDEMIA, UNSPECIFIED HYPERLIPIDEMIA TYPE: Primary | ICD-10-CM

## 2022-09-29 RX ORDER — PRAVASTATIN SODIUM 40 MG
40 TABLET ORAL DAILY
Qty: 30 TABLET | Refills: 5 | Status: SHIPPED | OUTPATIENT
Start: 2022-09-29

## 2022-09-29 NOTE — TELEPHONE ENCOUNTER
I spoke with Edin Dougherty (ok per signed Petersonleana Segovia) and he advised that Claudia Maryjo did not want to restart simvastatin due to issues with legs while taking the simvastatin and would rather start a new medication.

## 2022-09-29 NOTE — TELEPHONE ENCOUNTER
----- Message from Loraine Melendez DO sent at 9/29/2022 12:54 PM EDT -----  Cholesterol jumped up quite a bit since stopping the simvastatin. Is she ok with restarting the simvastatin or would she like to try a different medication?

## 2022-10-14 ENCOUNTER — HOSPITAL ENCOUNTER (EMERGENCY)
Age: 77
Discharge: HOME | End: 2022-10-14
Payer: MEDICARE

## 2022-10-14 ENCOUNTER — APPOINTMENT (OUTPATIENT)
Dept: GENERAL RADIOLOGY | Age: 77
End: 2022-10-14
Payer: MEDICARE

## 2022-10-14 VITALS
RESPIRATION RATE: 14 BRPM | TEMPERATURE: 97.8 F | BODY MASS INDEX: 45.31 KG/M2 | SYSTOLIC BLOOD PRESSURE: 159 MMHG | HEIGHT: 61 IN | WEIGHT: 240 LBS | OXYGEN SATURATION: 93 % | DIASTOLIC BLOOD PRESSURE: 64 MMHG | HEART RATE: 66 BPM

## 2022-10-14 DIAGNOSIS — M16.11 PRIMARY OSTEOARTHRITIS OF RIGHT HIP: Primary | ICD-10-CM

## 2022-10-14 DIAGNOSIS — M70.61 GREATER TROCHANTERIC BURSITIS OF RIGHT HIP: ICD-10-CM

## 2022-10-14 PROCEDURE — 73502 X-RAY EXAM HIP UNI 2-3 VIEWS: CPT

## 2022-10-14 PROCEDURE — 72100 X-RAY EXAM L-S SPINE 2/3 VWS: CPT

## 2022-10-14 PROCEDURE — 96372 THER/PROPH/DIAG INJ SC/IM: CPT

## 2022-10-14 PROCEDURE — 99284 EMERGENCY DEPT VISIT MOD MDM: CPT

## 2022-10-14 PROCEDURE — 6360000002 HC RX W HCPCS: Performed by: PHYSICIAN ASSISTANT

## 2022-10-14 RX ORDER — KETOROLAC TROMETHAMINE 30 MG/ML
15 INJECTION, SOLUTION INTRAMUSCULAR; INTRAVENOUS ONCE
Status: COMPLETED | OUTPATIENT
Start: 2022-10-14 | End: 2022-10-14

## 2022-10-14 RX ORDER — HYDROCODONE BITARTRATE AND ACETAMINOPHEN 5; 325 MG/1; MG/1
1 TABLET ORAL EVERY 6 HOURS PRN
Qty: 6 TABLET | Refills: 0 | Status: SHIPPED | OUTPATIENT
Start: 2022-10-14 | End: 2022-10-17

## 2022-10-14 RX ADMIN — KETOROLAC TROMETHAMINE 15 MG: 30 INJECTION, SOLUTION INTRAMUSCULAR; INTRAVENOUS at 17:43

## 2022-10-14 ASSESSMENT — ENCOUNTER SYMPTOMS
EYES NEGATIVE: 1
VOMITING: 0
DIARRHEA: 0
COUGH: 0
SHORTNESS OF BREATH: 0
SORE THROAT: 0
NAUSEA: 0
ABDOMINAL PAIN: 0

## 2022-10-14 ASSESSMENT — PAIN - FUNCTIONAL ASSESSMENT
PAIN_FUNCTIONAL_ASSESSMENT: 0-10
PAIN_FUNCTIONAL_ASSESSMENT: 0-10

## 2022-10-14 ASSESSMENT — PAIN SCALES - GENERAL
PAINLEVEL_OUTOF10: 5
PAINLEVEL_OUTOF10: 9
PAINLEVEL_OUTOF10: 9

## 2022-10-14 ASSESSMENT — PAIN DESCRIPTION - LOCATION: LOCATION: HIP

## 2022-10-14 ASSESSMENT — PAIN DESCRIPTION - ORIENTATION
ORIENTATION: RIGHT
ORIENTATION: RIGHT

## 2022-10-14 NOTE — ED PROVIDER NOTES
[facility] [address] [address] EMERGENCY DEPT    EMERGENCY MEDICINE     Pt Name: Carnella Councilman  MRN: [id number]  Jeaniegfsophia 1945  Date of evaluation: 10/14/2022  Provider: Penny Vera PA-C    CHIEF COMPLAINT       Chief Complaint   Patient presents with    Hip Pain       HISTORY OF PRESENT ILLNESS    Carnella Councilman is a pleasant 78 y.o. female who presents to the emergency department for right hip pain. Patient states she has a chronic issue with her right hip in which she has seen orthopedics before. She states she saw hip specialist once who told her that her pain was not coming from her hip and then saw a back specialist who said it was not coming from her back and so unclear where her pain is coming from. She states she had a CT scan done of her right hip ordered by her PCP approximately 8 months ago which demonstrated osteoarthritis but no other acute findings. She states for the most part she can deal with the right hip pain and takes Tylenol, meloxicam for the pain. She states she recently went to [place] for vacation with her  and did a lot of walking in [employer]. She states when she came home from her trip her right hip pain escalated. She states the pain is now 9/10 dull ache primarily to the front and lateral aspect of her right hip. Patient has no complaints of numbness and tingling to the right lower extremity. States the pain is much worse with ambulation and is mild at rest.  He has no other concerns or complaints at this time. She is able to ambulate but with discomfort. Triage notes and Nursing notes were reviewed by myself. Any discrepancies are addressed above.     PAST MEDICAL HISTORY     Past Medical History:   Diagnosis Date    Acid reflux     Arthritis     Back pain     Breast cancer ([facility] [address] 2014    left    Cancer [facility])     breast left    Constipation     Hyperlipidemia     Hypertension     Kidney cysts     Lung mass 2013    left lung no longer there (when they went to do biopsy it was gone)    Ulcer        SURGICAL HISTORY       Past Surgical History:   Procedure Laterality Date    APPENDECTOMY  11/20/2014    Dr. Palacios Agent Left 12/18/14    Left Simple Mastectomy with [place] Lymph Node Biopsy - Dr. Jeovany Burger  93/94/7921    incarcertated herina repair    TUBAL LIGATION      UPPER GASTROINTESTINAL ENDOSCOPY      URETHRA SURGERY      stretched       CURRENT MEDICATIONS       Discharge Medication List as of 10/14/2022  7:12 PM        CONTINUE these medications which have NOT CHANGED    Details   pravastatin (PRAVACHOL) 40 MG tablet Take 1 tablet by mouth daily, Disp-30 tablet, R-5Normal      meloxicam (MOBIC) 7.5 MG tablet Take 1 tablet by mouth daily as needed for Pain, Disp-30 tablet, R-3Normal      Handicap Placard MISC Starting Mon 9/12/2022, Disp-1 each, R-0, PrintExpires 9/12/27      Omega 3-6-9 Fatty Acids (OMEGA 3-6-9 COMPLEX PO) Take by mouthHistorical Med      b complex vitamins capsule Take 1 capsule by mouth dailyHistorical Med      Milk Thistle 1000 MG CAPS Take by mouthHistorical Med      amLODIPine (NORVASC) 5 MG tablet Take 1 tablet by mouth daily, Disp-90 tablet, R-3Normal      losartan (COZAAR) 100 MG tablet Take 1 tablet by mouth daily, Disp-90 tablet, R-3Normal      metoprolol succinate (TOPROL XL) 25 MG extended release tablet Take 1 tablet by mouth daily, Disp-90 tablet, R-3Normal      solifenacin (VESICARE) 5 MG tablet Take 2 tablets by mouth daily, Disp-180 tablet, R-3Normal      omeprazole (PRILOSEC) 20 MG delayed release capsule Take 1 capsule by mouth daily, Disp-90 capsule, R-3Normal      betamethasone valerate (VALISONE) 0.1 % cream Apply topically 2 times daily PRN., Disp-1 Tube, R-1, Normal      Calcium Citrate-Vitamin D (CALCIUM CITRATE+D3 PO) Take by mouth dailyHistorical Med      Glucosamine-Chondroit-Vit C-Mn (GLUCOSAMINE CHONDR 1500 COMPLX PO) Take by mouthHistorical Med      !! NONFORMULARY Magnesium- Vit D3 - Turmeric  1 dailyHistorical Med      !! NONFORMULARY Olive Leaf Extract 450 mg dailyHistorical Med      !! NONFORMULARY Turmeric POHistorical Med      vitamin B-12 (CYANOCOBALAMIN) 500 MCG tablet Take 5,000 mcg by mouth daily Historical Med      Lactobacillus (ULTIMATE PROBIOTIC FORMULA PO) Take by mouth daily      aspirin 81 MG tablet Take 81 mg by mouth daily      Multiple Vitamins-Minerals (MULTIVITAMIN PO) Take by mouth daily        ! ! - Potential duplicate medications found. Please discuss with provider. ALLERGIES     Acrylic polymer [carbomer], Coal tar extract, Crestor [rosuvastatin calcium], Gabapentin, Lipitor [atorvastatin], Myrbetriq [mirabegron], Other, Oxybutynin, Duloxetine, and Tape [adhesive tape]    FAMILY HISTORY       Family History   Problem Relation Age of Onset    Stroke Mother     Other Mother         dementia    Osteoporosis Mother     Heart Disease Father     Cancer Sister         skin    High Blood Pressure Paternal Uncle     Stroke Maternal Grandmother     Other Maternal Grandmother         dementia    Diabetes Neg Hx         SOCIAL HISTORY       Social History     Socioeconomic History    Marital status:     Number of children: 3   Tobacco Use    Smoking status: Never    Smokeless tobacco: Never   Vaping Use    Vaping Use: Never used   Substance and Sexual Activity    Alcohol use: No    Drug use: No     Social Determinants of Health     Financial Resource Strain: Low Risk     Difficulty of Paying Living Expenses: Not hard at all   Food Insecurity: No Food Insecurity    Worried About Running Out of Food in the Last Year: Never true    Ran Out of Food in the Last Year: Never true       REVIEW OF SYSTEMS     Review of Systems   Constitutional:  Negative for chills and fever. HENT:  Negative for congestion, ear pain and sore throat. Eyes: Negative. Respiratory:  Negative for cough and shortness of breath. Cardiovascular:  Negative for chest pain and palpitations. Gastrointestinal:  Negative for abdominal pain, diarrhea, nausea and vomiting. Endocrine: Negative. Genitourinary:  Negative for dysuria and frequency. Musculoskeletal:  Positive for arthralgias and gait problem. Negative for myalgias and neck pain. Skin:  Negative for rash. Neurological:  Negative for dizziness, light-headedness and headaches. Hematological: Negative. Psychiatric/Behavioral: Negative. All other systems reviewed and are negative. Except as noted above the remainder of the review of systems was reviewed and is. SCREENINGS                          PHYSICAL EXAM     INITIAL VITALS:  height is 5' 1\" (1.549 m) and weight is 240 lb (108.9 kg). Her oral temperature is 97.8 °F (36.6 °C). Her blood pressure is 159/64 (abnormal) and her pulse is 66. Her respiration is 14 and oxygen saturation is 93%. Physical Exam  Vitals and nursing note reviewed. Exam conducted with a chaperone present. Constitutional:       General: She is not in acute distress. Appearance: Normal appearance. She is normal weight. She is not ill-appearing, toxic-appearing or diaphoretic. HENT:      Head: Normocephalic and atraumatic. Right Ear: External ear normal.      Left Ear: External ear normal.      Nose: Nose normal.      Mouth/Throat:      Mouth: Mucous membranes are moist.   Eyes:      Extraocular Movements: Extraocular movements intact. Pupils: Pupils are equal, round, and reactive to light. Cardiovascular:      Rate and Rhythm: Normal rate and regular rhythm. Pulses: Normal pulses. Heart sounds: Normal heart sounds. No murmur heard. Pulmonary:      Effort: Pulmonary effort is normal. No respiratory distress. Breath sounds: Normal breath sounds. Abdominal:      General: Bowel sounds are normal. There is no distension. Palpations: Abdomen is soft. Tenderness: There is no abdominal tenderness. Musculoskeletal:         General: Normal range of motion. Cervical back: Normal range of motion and neck supple. Comments: Right lower extremity demonstrates skin that is intact. No significant edema, erythema, ecchymosis noted. Tender to palpation anterior hip, lateral hip, and right SI joint. Has limited passive range of motion of the right hip secondary to pain. Limited hip flexion and internal rotation secondary to pain. 2+ palpable dorsalis pedis and posttibial pulse. Sensation intact distally light touch throughout the foot. Negative Homans. 5/5 strength EHL, dorsiflexion, plantarflexion. Skin:     General: Skin is warm and dry. Capillary Refill: Capillary refill takes less than 2 seconds. Neurological:      Mental Status: She is alert and oriented to person, place, and time. GCS: GCS eye subscore is 4. GCS verbal subscore is 5. GCS motor subscore is 6. Psychiatric:         Mood and Affect: Mood normal.         Behavior: Behavior normal.         Thought Content: Thought content normal.       DIFFERENTIAL DIAGNOSIS:   Differential diagnoses are discussed    DIAGNOSTIC RESULTS     EKG:(none if blank)  All EKGs are interpreted by the Emergency Department Physician who either signs or Co-signs this chart in the absence of a cardiologist.          RADIOLOGY: (none if blank)   I directly visualized the following images and reviewed the radiologist interpretations. Interpretation per the Radiologist below, if available at the time of this note:  XR HIP 2-3 VW W PELVIS RIGHT   Final Result   No acute fracture or dislocation. Moderately severe right hip osteoarthritis. Increased right-sided colonic stool volume. This document has been electronically signed by: Sabine Moss MD on    10/14/2022 06:38 PM      XR LUMBAR SPINE (2-3 VIEWS)   Final Result   Retrolisthesis of the L5 vertebra. L4-S1 degenerative spondylotic changes. Bilateral hip osteoarthrosis. Author Miesha This document has been electronically signed by: Rupert Trinh MD on    10/14/2022 06:42 PM          LABS:   Labs Reviewed - No data to display    All other labs were within normal range or not returned as of this dictation. Please note, any cultures that may have been sent were not resulted at the time of this patient visit. EMERGENCY DEPARTMENT COURSE:   Vitals:    Vitals:    10/14/22 1722 10/14/22 1921   BP: (!) 150/68 (!) 159/64   Pulse: 69 66   Resp: 16 14   Temp: 97.8 °F (36.6 °C)    TempSrc: Oral    SpO2: 94% 93%   Weight: 240 lb (108.9 kg)    Height: 5' 1\" (1.549 m)      6:11 PM EDT: The patient was seen and evaluated. PROCEDURES: (None if blank)  Procedures         ED Medications administered this visit:    Medications   ketorolac (TORADOL) injection 15 mg (15 mg IntraMUSCular Given 10/14/22 1743)       MDM:  Patient is a 53-year-old female who came to the ED to be evaluated for right hip pain. Appropriate testing/imaging of right hip x-ray, pelvis x-ray, lumbar x-ray was done based on the patient's initial complaints, history, and physical exam.   Pertinent results were right hip demonstrating moderate hip osteoarthritis, lumbar osteoarthritis noted on lumbar x-rays. Discussed with patient that at this point believe her ongoing issue is the moderate right hip osteoarthritis along with associated right greater trochanteric bursitis. Patient already taking meloxicam for inflammation and will additionally add short-term pain medicine for the acute flareup of pain. Recommend follow-up with orthopedics in the next 3 to 5 days for reevaluation and possible cortisone injection and management of the right hip arthritis. Patient was seen independently by myself. The patient's final impression and disposition and plan was determined by myself. Strict return precautions and follow up instructions were discussed with the patient prior to discharge, with which the patient agrees.      Physical assessment findings, diagnostic testing(s) if applicable, and vital signs reviewed with patient/patient representative. Questions answered. Medications as directed, including OTC medications for supportive care. Education provided on medications. Differential diagnosis(s) discussed with patient/patient representative. Home care/self care instructions reviewed with patient/patient representative. Patient is to go to the emergency department if symptoms worsen. Patient/patient representative is aware of care plan, questions answered, verbalizes understanding and is in agreement. CRITICAL CARE:   None    CONSULTS:  None    PROCEDURES:  None    FINAL IMPRESSION      1. Primary osteoarthritis of right hip    2. Greater trochanteric bursitis of right hip          DISPOSITION/PLAN   Discharge home    PATIENT REFERRED TO:  [address]  [phone]  In 3 days  For re-evaluation, possible cortisone injections    [facility] EMERGENCY DEPT  [address] [place] [address]  [phone]  In 2 days  If symptoms worsen    DISCHARGEMEDICATIONS:  Discharge Medication List as of 10/14/2022  7:12 PM        START taking these medications    Details   HYDROcodone-acetaminophen (NORCO) 5-325 MG per tablet Take 1 tablet by mouth every 6 hours as needed for Pain for up to 3 days. Intended supply: 3 days.  Take lowest dose possible to manage pain, Disp-6 tablet, R-0Normal                  (Please note that portions of this note were completed with a voice recognition program.  Efforts were made to edit the dictations but occasionallywords are mis-transcribed.)      Penny Vera PA-C(electronically signed)  Physician Associate, Emergency Department        Penny Vera PA-C  10/14/22 2007

## 2022-10-14 NOTE — ED TRIAGE NOTES
Patient presents to ED with chief complaint of right hip pain. Patient states she has had this pain for years and has been seen at [facility]. Patient states pain was worse than normal today rating pain at 9/10. Denies any falls. Patient stated she took Tylenol and meloxicam at home for pain control. Patient resting in bed. Respirations easy and unlabored. No distress noted. Call light within reach.

## 2022-10-14 NOTE — DISCHARGE INSTRUCTIONS
Recommend NSAIDS/tylenol/pain medication for pain. Rest, gentle stretching exercises. Follow up with orthopedics in the next 3 days for re-evaluation of right hip pain.

## 2023-01-10 ENCOUNTER — OFFICE VISIT (OUTPATIENT)
Dept: FAMILY MEDICINE CLINIC | Age: 78
End: 2023-01-10

## 2023-01-10 VITALS
DIASTOLIC BLOOD PRESSURE: 84 MMHG | BODY MASS INDEX: 45.54 KG/M2 | SYSTOLIC BLOOD PRESSURE: 162 MMHG | HEART RATE: 83 BPM | OXYGEN SATURATION: 94 % | HEIGHT: 61 IN | TEMPERATURE: 97.1 F | WEIGHT: 241.2 LBS

## 2023-01-10 DIAGNOSIS — I10 HTN, GOAL BELOW 140/90: ICD-10-CM

## 2023-01-10 DIAGNOSIS — E11.69 TYPE 2 DIABETES MELLITUS WITH OTHER SPECIFIED COMPLICATION, WITHOUT LONG-TERM CURRENT USE OF INSULIN (HCC): Primary | ICD-10-CM

## 2023-01-10 DIAGNOSIS — E11.9 DIET-CONTROLLED TYPE 2 DIABETES MELLITUS (HCC): ICD-10-CM

## 2023-01-10 LAB — HBA1C MFR BLD: 6.9 % (ref 4.3–5.7)

## 2023-01-10 RX ORDER — AMLODIPINE BESYLATE 10 MG/1
10 TABLET ORAL DAILY
Qty: 90 TABLET | Refills: 3 | Status: SHIPPED | OUTPATIENT
Start: 2023-01-10

## 2023-01-10 RX ORDER — LANCETS 30 GAUGE
EACH MISCELLANEOUS
Qty: 100 EACH | Refills: 3 | Status: SHIPPED | OUTPATIENT
Start: 2023-01-10

## 2023-01-10 RX ORDER — GLUCOSAMINE HCL/CHONDROITIN SU 500-400 MG
CAPSULE ORAL
Qty: 90 STRIP | Refills: 3 | Status: SHIPPED | OUTPATIENT
Start: 2023-01-10

## 2023-01-10 ASSESSMENT — PATIENT HEALTH QUESTIONNAIRE - PHQ9
SUM OF ALL RESPONSES TO PHQ QUESTIONS 1-9: 0
SUM OF ALL RESPONSES TO PHQ QUESTIONS 1-9: 0
SUM OF ALL RESPONSES TO PHQ9 QUESTIONS 1 & 2: 0
SUM OF ALL RESPONSES TO PHQ QUESTIONS 1-9: 0
SUM OF ALL RESPONSES TO PHQ QUESTIONS 1-9: 0
2. FEELING DOWN, DEPRESSED OR HOPELESS: 0
1. LITTLE INTEREST OR PLEASURE IN DOING THINGS: 0

## 2023-01-10 NOTE — PROGRESS NOTES
Bianca Valles is a 78 y.o. female thatpresents for 6 Month Follow-Up      History obtained today from Patient. HTN    Does patient check BP regularly at home? - Yes - been high, 160s/90 at home   Current Medication regimen - Metoprolol, Losartan  Tolerating medications well? - yes    Shortness of breath or chest pain? Yes - sob at times with lying down  Headache or visual complaints? Yes - HA intermittently  Neurologic changes like confusion? No  Extremity edema? Yes    BP Readings from Last 3 Encounters:   01/10/23 (!) 162/84   10/14/22 (!) 159/64   09/12/22 138/82     Diet controlled DM   A1c 6.9 today    Diabetes Type 2    Glucose control:   Does patient check blood glucoses at home? No  Report of hypoglycemia: no  Lab Results   Component Value Date    LABA1C 6.9 (H) 01/10/2023     Lab Results   Component Value Date     01/18/2016       Symptoms  Polyuria, Polydipsia or Polyphagia? Yes - polyuria  Chest Pain, SOB, or Palpitations? -  Yes sob at times when lying   New Vision complaints? Yes - feels vision has worsened over time  Paresthesias of the extremities? No    Medications  Current medication were reviewed. Compliant with medications? yes  Medication side effects? No  On ACE-I or ARB? Yes Losartan  On antiplatelet therapy? Yes  On Statin? Stopped taking d/t myalgia    Last Diabetic Eye Exam: utd    Exercise  Exercise? No  Wt Readings from Last 3 Encounters:   01/10/23 241 lb 3.2 oz (109.4 kg)   10/14/22 240 lb (108.9 kg)   09/12/22 245 lb 9.6 oz (111.4 kg)       Diet discipline?:  Low salt, fat, sugar diet?   yes    Blood pressure control:  BP Readings from Last 3 Encounters:   01/10/23 (!) 162/84   10/14/22 (!) 159/64   09/12/22 138/82       No results found for: Susan Delcid    Lab Results   Component Value Date    [address] 215 09/28/2022    LDLDIRECT 142 (H) 04/06/2018             I have reviewed the patient's past medical history, past surgical history, allergies,medications, social and family history and I have made updates where appropriate. Past Medical History:   Diagnosis Date    Acid reflux     Arthritis     Back pain     Breast cancer ([facility] [address] 2014    left    Cancer ([facility] [address]     breast left    Constipation     Hyperlipidemia     Hypertension     Kidney cysts     Lung mass 2013    left lung no longer there (when they went to do biopsy it was gone)    Ulcer        Social History     Tobacco Use    Smoking status: Never    Smokeless tobacco: Never   Vaping Use    Vaping Use: Never used   Substance Use Topics    Alcohol use: No    Drug use: No       Family History   Problem Relation Age of Onset    Stroke Mother     Other Mother         dementia    Osteoporosis Mother     Heart Disease Father     Cancer Sister         skin    High Blood Pressure Paternal Uncle     Stroke Maternal Grandmother     Other Maternal Grandmother         dementia    Diabetes Neg Hx          Review of Systems        PHYSICAL EXAM:  BP (!) 162/84 (Site: Right Upper Arm, Position: Sitting)   Pulse 83   Temp 97.1 °F (36.2 °C) (Infrared)   Ht 5' 1\" (1.549 m)   Wt 241 lb 3.2 oz (109.4 kg)   SpO2 94%   BMI 45.57 kg/m²     Physical Exam  Constitutional:       Appearance: Normal appearance. HENT:      Head: Normocephalic and atraumatic. Right Ear: External ear normal.      Left Ear: External ear normal.      Nose: Nose normal.      Mouth/Throat:      Mouth: Mucous membranes are moist.   Eyes:      Conjunctiva/sclera: Conjunctivae normal.   Cardiovascular:      Rate and Rhythm: Normal rate and regular rhythm. Pulses: Normal pulses. Heart sounds: Normal heart sounds. Pulmonary:      Effort: Pulmonary effort is normal.      Breath sounds: Normal breath sounds. Abdominal:      General: Bowel sounds are normal.      Palpations: Abdomen is soft. Musculoskeletal:         General: Normal range of motion. Cervical back: Normal range of motion.    Skin:     General: Skin is warm and dry. Neurological:      General: No focal deficit present. Mental Status: She is alert and oriented to person, place, and time. Psychiatric:         Mood and Affect: Mood normal.         Behavior: Behavior normal.         ASSESSMENT & PLAN  Ganesh Garcia was seen today for 6 month follow-up. Diagnoses and all orders for this visit:    Type 2 diabetes mellitus with other specified complication, without long-term current use of insulin ([address]  -     Lancets MISC; Check blood sugar q daily  -     blood glucose monitor strips; Test 1 times a day & as needed for symptoms of irregular blood glucose. Dispense sufficient amount for indicated testing frequency plus additional to accommodate PRN testing needs. HTN, goal below 140/90  -     amLODIPine (NORVASC) 10 MG tablet; Take 1 tablet by mouth daily    Body mass index (BMI) 45.0-49.9, adult ([address]    Diet-controlled type 2 diabetes mellitus ([address]    Other orders  -     POCT glycosylated hemoglobin (Hb A1C)  -     metFORMIN (GLUCOPHAGE) 500 MG tablet; Take 1 tab daily with breakfast x 2 weeks, then increase to 1 tab BID with meals    BP uncontrolled, will increase Amlodipine to 10 mg daily  Nurse visit in 1 week for recheck of BP    A1c increasing  Will start Metformin    RTO in 6 weeks    No follow-ups on file. Start above treatments    All copied or forwarded information in the progress note was verified by me to be accurate at the time of visit  Patient's past medical, surgical, social and family history were reviewed and updated     All patient questions answered. Patient voiced understanding.

## 2023-01-17 ENCOUNTER — NURSE ONLY (OUTPATIENT)
Dept: FAMILY MEDICINE CLINIC | Age: 78
End: 2023-01-17

## 2023-01-17 VITALS — DIASTOLIC BLOOD PRESSURE: 78 MMHG | SYSTOLIC BLOOD PRESSURE: 140 MMHG

## 2023-01-17 DIAGNOSIS — Z01.30 BLOOD PRESSURE CHECK: Primary | ICD-10-CM

## 2023-01-17 NOTE — PROGRESS NOTES
Pt came into the office for blood pressure check. Blood pressure was 140/78 and advised Chadd Guevara CNP with list of current b/ps taken at home. Pt b/ps stable and start metformin when she receives it. Pt to follow up in 1 month with A1C. Pt scheduled.

## 2023-02-21 ENCOUNTER — OFFICE VISIT (OUTPATIENT)
Dept: FAMILY MEDICINE CLINIC | Age: 78
End: 2023-02-21

## 2023-02-21 VITALS
HEIGHT: 61 IN | RESPIRATION RATE: 18 BRPM | HEART RATE: 102 BPM | DIASTOLIC BLOOD PRESSURE: 76 MMHG | OXYGEN SATURATION: 93 % | TEMPERATURE: 97 F | WEIGHT: 244.6 LBS | BODY MASS INDEX: 46.18 KG/M2 | SYSTOLIC BLOOD PRESSURE: 128 MMHG

## 2023-02-21 DIAGNOSIS — I10 HTN, GOAL BELOW 140/90: ICD-10-CM

## 2023-02-21 DIAGNOSIS — Z01.30 BLOOD PRESSURE CHECK: ICD-10-CM

## 2023-02-21 DIAGNOSIS — E11.69 TYPE 2 DIABETES MELLITUS WITH OTHER SPECIFIED COMPLICATION, WITHOUT LONG-TERM CURRENT USE OF INSULIN (HCC): Primary | ICD-10-CM

## 2023-02-21 LAB — HBA1C MFR BLD: 6.4 % (ref 4.3–5.7)

## 2023-02-21 RX ORDER — UBIDECARENONE 200 MG
200 CAPSULE ORAL DAILY
COMMUNITY

## 2023-02-21 SDOH — ECONOMIC STABILITY: HOUSING INSECURITY
IN THE LAST 12 MONTHS, WAS THERE A TIME WHEN YOU DID NOT HAVE A STEADY PLACE TO SLEEP OR SLEPT IN A SHELTER (INCLUDING NOW)?: NO

## 2023-02-21 SDOH — ECONOMIC STABILITY: FOOD INSECURITY: WITHIN THE PAST 12 MONTHS, YOU WORRIED THAT YOUR FOOD WOULD RUN OUT BEFORE YOU GOT MONEY TO BUY MORE.: NEVER TRUE

## 2023-02-21 SDOH — ECONOMIC STABILITY: INCOME INSECURITY: HOW HARD IS IT FOR YOU TO PAY FOR THE VERY BASICS LIKE FOOD, HOUSING, MEDICAL CARE, AND HEATING?: NOT HARD AT ALL

## 2023-02-21 SDOH — ECONOMIC STABILITY: FOOD INSECURITY: WITHIN THE PAST 12 MONTHS, THE FOOD YOU BOUGHT JUST DIDN'T LAST AND YOU DIDN'T HAVE MONEY TO GET MORE.: NEVER TRUE

## 2023-02-21 NOTE — PROGRESS NOTES
Ronald [facility] [place] [address] MEDICINE  [address] [place] [address]  Dept: [phone]  Loc: [phone]  Visit Date: 2/21/2023      Chief Complaint:   Edy Mcfadden is a 78 y.o. female thatpresents for Follow-up (diabetes) and Abdominal Pain    HPI:  BP better  Had HA, but trouble with sinuses too  A1c has improved  No dietary changes  Abd pain, had for years, follows with GI for this,       The patient comes in with her  today. History obtained from pt and medical records. I have reviewed the patient's past medical history, past surgical history, allergies,medications, social and family history and I have made updates where appropriate.     Past Medical History:   Diagnosis Date    Acid reflux     Arthritis     Back pain     Breast cancer ([address] 2014    left    Cancer ([address]     breast left    Constipation     Hyperlipidemia     Hypertension     Kidney cysts     Lung mass 2013    left lung no longer there (when they went to do biopsy it was gone)    Ulcer        Past Surgical History:   Procedure Laterality Date    APPENDECTOMY  11/20/2014    Dr. Nigel Hart Left 12/18/14    Left Simple Mastectomy with [place] Lymph Node Biopsy - Dr. Collins Listen  65/80/4474    incarcertated herina repair    TUBAL LIGATION      UPPER GASTROINTESTINAL ENDOSCOPY      URETHRA SURGERY      stretched       Social History     Tobacco Use    Smoking status: Never    Smokeless tobacco: Never   Vaping Use    Vaping Use: Never used   Substance Use Topics    Alcohol use: No    Drug use: No       Family History   Problem Relation Age of Onset    Stroke Mother     Other Mother         dementia    Osteoporosis Mother     Heart Disease Father     Cancer Sister         skin    High Blood Pressure Paternal Uncle     Stroke Maternal Grandmother     Other Maternal Grandmother         dementia Diabetes Neg Hx          Review of Systems          PHYSICAL EXAM:  /76   Pulse (!) 102   Temp 97 °F (36.1 °C) (Infrared)   Resp 18   Ht 5' 1\" (1.549 m)   Wt 244 lb 9.6 oz (110.9 kg)   SpO2 93%   BMI 46.22 kg/m²     Physical Exam  Constitutional:       Appearance: Normal appearance. HENT:      Head: Normocephalic and atraumatic. Right Ear: External ear normal.      Left Ear: External ear normal.      Nose: Nose normal.      Mouth/Throat:      Mouth: Mucous membranes are moist.   Eyes:      Conjunctiva/sclera: Conjunctivae normal.   Cardiovascular:      Rate and Rhythm: Normal rate and regular rhythm. Pulses: Normal pulses. Heart sounds: Normal heart sounds. Pulmonary:      Effort: Pulmonary effort is normal.      Breath sounds: Normal breath sounds. Abdominal:      General: Bowel sounds are normal.      Palpations: Abdomen is soft. Musculoskeletal:         General: Normal range of motion. Cervical back: Normal range of motion. Skin:     General: Skin is warm and dry. Neurological:      General: No focal deficit present. Mental Status: She is alert and oriented to person, place, and time. Psychiatric:         Mood and Affect: Mood normal.         Behavior: Behavior normal.           ASSESSMENT & PLAN  Brea Malagon was seen today for follow-up and abdominal pain. Diagnoses and all orders for this visit:    Type 2 diabetes mellitus with other specified complication, without long-term current use of insulin (HCC)    Body mass index (BMI) 45.0-49.9, adult (HCC)    HTN, goal below 140/90    Blood pressure check    Other orders  -     POCT glycosylated hemoglobin (Hb A1C)    A1c still elevated but improving  Continue current meds and dietary changes  Will recheck in couple mos    BP stable  Continue meds    Return in about 2 months (around 4/21/2023).     I spent 30+ minutes with the patient discussing symptoms, medications and side effects, treatment options, performing an exam, reviewing previous notes and tests, and developing a plan of care. All questions answered. Patient verbalized understanding. Den Ornelas received counseling on the following healthy behaviors: nutrition, exercise, and medication adherence  Reviewedprior labs and health maintenance. Continue current medications, diet and exercise. Discussed use, benefit, and side effects of prescribed medications. Barriers to medication compliance addressed. Patient given educationalmaterials - see patient instructions. All patient questions answered. Patient voiced understanding.      Electronically signed by TESS Posey - CNP, APRN on 2/21/23 at 1:34 PM EST

## 2023-04-03 ENCOUNTER — OFFICE VISIT (OUTPATIENT)
Dept: FAMILY MEDICINE CLINIC | Age: 78
End: 2023-04-03
Payer: MEDICARE

## 2023-04-03 VITALS
SYSTOLIC BLOOD PRESSURE: 130 MMHG | TEMPERATURE: 97.8 F | WEIGHT: 240.8 LBS | DIASTOLIC BLOOD PRESSURE: 70 MMHG | HEART RATE: 86 BPM | BODY MASS INDEX: 45.46 KG/M2 | OXYGEN SATURATION: 96 % | RESPIRATION RATE: 16 BRPM | HEIGHT: 61 IN

## 2023-04-03 DIAGNOSIS — Z85.3 HISTORY OF BREAST CANCER IN FEMALE: Primary | ICD-10-CM

## 2023-04-03 DIAGNOSIS — R51.9 SINUS HEADACHE: ICD-10-CM

## 2023-04-03 DIAGNOSIS — E78.5 HYPERLIPIDEMIA, UNSPECIFIED HYPERLIPIDEMIA TYPE: ICD-10-CM

## 2023-04-03 DIAGNOSIS — D05.12 DUCTAL CARCINOMA IN SITU (DCIS) OF LEFT BREAST: ICD-10-CM

## 2023-04-03 DIAGNOSIS — R09.82 POST-NASAL DRIP: ICD-10-CM

## 2023-04-03 PROCEDURE — 1123F ACP DISCUSS/DSCN MKR DOCD: CPT | Performed by: NURSE PRACTITIONER

## 2023-04-03 PROCEDURE — 3075F SYST BP GE 130 - 139MM HG: CPT | Performed by: NURSE PRACTITIONER

## 2023-04-03 PROCEDURE — 3078F DIAST BP <80 MM HG: CPT | Performed by: NURSE PRACTITIONER

## 2023-04-03 PROCEDURE — G8417 CALC BMI ABV UP PARAM F/U: HCPCS | Performed by: NURSE PRACTITIONER

## 2023-04-03 PROCEDURE — G8427 DOCREV CUR MEDS BY ELIG CLIN: HCPCS | Performed by: NURSE PRACTITIONER

## 2023-04-03 PROCEDURE — 3017F COLORECTAL CA SCREEN DOC REV: CPT | Performed by: NURSE PRACTITIONER

## 2023-04-03 PROCEDURE — G8399 PT W/DXA RESULTS DOCUMENT: HCPCS | Performed by: NURSE PRACTITIONER

## 2023-04-03 PROCEDURE — 1036F TOBACCO NON-USER: CPT | Performed by: NURSE PRACTITIONER

## 2023-04-03 PROCEDURE — 1090F PRES/ABSN URINE INCON ASSESS: CPT | Performed by: NURSE PRACTITIONER

## 2023-04-03 PROCEDURE — 99214 OFFICE O/P EST MOD 30 MIN: CPT | Performed by: NURSE PRACTITIONER

## 2023-04-03 RX ORDER — PREDNISONE 20 MG/1
40 TABLET ORAL DAILY
Qty: 14 TABLET | Refills: 0 | Status: SHIPPED | OUTPATIENT
Start: 2023-04-03 | End: 2023-04-10

## 2023-04-03 RX ORDER — DOXYCYCLINE HYCLATE 100 MG
100 TABLET ORAL 2 TIMES DAILY
Qty: 14 TABLET | Refills: 0 | Status: SHIPPED | OUTPATIENT
Start: 2023-04-03 | End: 2023-04-10

## 2023-04-03 RX ORDER — FLUTICASONE PROPIONATE 50 MCG
2 SPRAY, SUSPENSION (ML) NASAL DAILY
Qty: 1 EACH | Refills: 0 | Status: SHIPPED | OUTPATIENT
Start: 2023-04-03

## 2023-04-03 RX ORDER — PRAVASTATIN SODIUM 40 MG
40 TABLET ORAL DAILY
Qty: 90 TABLET | Refills: 3 | Status: SHIPPED | OUTPATIENT
Start: 2023-04-03

## 2023-04-03 NOTE — PROGRESS NOTES
Ro Abraham is a 78 y.o. female thatpresents for Breast Pain (Left side on and off for the past week) and Headache      History obtained today from Patient. Left breast pain x 1 week  Hx breast cancer on that side in 2014, had masectomy on that side  Has not had any imaging since 2016 on that side, last US showed some benign looking lymph nodes  Unsure if she feels anything abnormal when she did SBE  Pain mainly in the upper and lower outer quadrants    Headache  Symptoms have been present for 1 week(s). Description of HA - ache    Inciting event? - recently had sinus congestion, notices when she lays down she gets congested and gets the HA, but goes and sleeps upright and the HA and congestion improved   Severity - 3/10  History of migraines? -  No  Treatment tried and response - Tylenol helps, but not taken anything for the congestion  Worse on the left side especially around the eyes    Fever over 100.5- No  Neck stiffness - No  Focal weakness/paresthesias of arm/leg - No  Nausea/vomiting - No  \"Worst headache ever\" - No  Confusion - No  Photophobia/Phonophobia - No  Changes in vision - Yes - vision has been worsening over time, but no new changes to rx      I have reviewed the patient's past medical history, past surgical history, allergies,medications, social and family history and I have made updates where appropriate.     Past Medical History:   Diagnosis Date    Acid reflux     Arthritis     Back pain     [facility] 2014    left    Cancer ([facility]     breast left    Constipation     Hyperlipidemia     Hypertension     Kidney cysts     Lung mass 2013    left lung no longer there (when they went to do biopsy it was gone)    Ulcer        Social History     Tobacco Use    Smoking status: Never    Smokeless tobacco: Never   Vaping Use    Vaping Use: Never used   Substance Use Topics    Alcohol use: No    Drug use: No       Family History   Problem Relation Age of Onset    Stroke Mother 36.8

## 2023-05-02 ENCOUNTER — OFFICE VISIT (OUTPATIENT)
Dept: FAMILY MEDICINE CLINIC | Age: 78
End: 2023-05-02
Payer: MEDICARE

## 2023-05-02 VITALS
WEIGHT: 241.2 LBS | HEIGHT: 61 IN | SYSTOLIC BLOOD PRESSURE: 112 MMHG | TEMPERATURE: 97 F | OXYGEN SATURATION: 97 % | BODY MASS INDEX: 45.54 KG/M2 | DIASTOLIC BLOOD PRESSURE: 74 MMHG | HEART RATE: 73 BPM | RESPIRATION RATE: 18 BRPM

## 2023-05-02 DIAGNOSIS — H66.90 ACUTE OTITIS MEDIA, UNSPECIFIED OTITIS MEDIA TYPE: ICD-10-CM

## 2023-05-02 DIAGNOSIS — J32.9 RHINOSINUSITIS: Primary | ICD-10-CM

## 2023-05-02 DIAGNOSIS — J31.0 RHINOSINUSITIS: Primary | ICD-10-CM

## 2023-05-02 LAB — HBA1C MFR BLD: 6.6 % (ref 4.3–5.7)

## 2023-05-02 PROCEDURE — 99214 OFFICE O/P EST MOD 30 MIN: CPT | Performed by: NURSE PRACTITIONER

## 2023-05-02 PROCEDURE — 1090F PRES/ABSN URINE INCON ASSESS: CPT | Performed by: NURSE PRACTITIONER

## 2023-05-02 PROCEDURE — 3078F DIAST BP <80 MM HG: CPT | Performed by: NURSE PRACTITIONER

## 2023-05-02 PROCEDURE — 1123F ACP DISCUSS/DSCN MKR DOCD: CPT | Performed by: NURSE PRACTITIONER

## 2023-05-02 PROCEDURE — 1036F TOBACCO NON-USER: CPT | Performed by: NURSE PRACTITIONER

## 2023-05-02 PROCEDURE — G8417 CALC BMI ABV UP PARAM F/U: HCPCS | Performed by: NURSE PRACTITIONER

## 2023-05-02 PROCEDURE — G8399 PT W/DXA RESULTS DOCUMENT: HCPCS | Performed by: NURSE PRACTITIONER

## 2023-05-02 PROCEDURE — G8427 DOCREV CUR MEDS BY ELIG CLIN: HCPCS | Performed by: NURSE PRACTITIONER

## 2023-05-02 PROCEDURE — 3017F COLORECTAL CA SCREEN DOC REV: CPT | Performed by: NURSE PRACTITIONER

## 2023-05-02 PROCEDURE — 3074F SYST BP LT 130 MM HG: CPT | Performed by: NURSE PRACTITIONER

## 2023-05-02 RX ORDER — CETIRIZINE HYDROCHLORIDE 10 MG/1
10 TABLET ORAL DAILY
Qty: 90 TABLET | Refills: 3 | Status: SHIPPED | OUTPATIENT
Start: 2023-05-02

## 2023-05-02 RX ORDER — PREDNISONE 20 MG/1
40 TABLET ORAL DAILY
Qty: 10 TABLET | Refills: 0 | Status: SHIPPED | OUTPATIENT
Start: 2023-05-02 | End: 2023-05-07

## 2023-05-02 RX ORDER — CEFDINIR 300 MG/1
300 CAPSULE ORAL 2 TIMES DAILY
Qty: 14 CAPSULE | Refills: 0 | Status: SHIPPED | OUTPATIENT
Start: 2023-05-02 | End: 2023-05-09

## 2023-05-05 ENCOUNTER — TELEPHONE (OUTPATIENT)
Dept: FAMILY MEDICINE CLINIC | Age: 78
End: 2023-05-05

## 2023-05-05 NOTE — TELEPHONE ENCOUNTER
Patient stated she now has a really bad cough that is making her throat hurt. She believes she caught whatever Cristal Section has.

## 2023-05-05 NOTE — TELEPHONE ENCOUNTER
----- Message from TESS Morris CNP sent at 5/2/2023  3:32 PM EDT -----  Please check on her symptoms

## 2023-05-05 NOTE — TELEPHONE ENCOUNTER
She is on an atb and steroid. She can take otc coricidin for her symptoms. Or I can send in a cough med if she wants?

## 2023-05-08 ENCOUNTER — TELEPHONE (OUTPATIENT)
Dept: FAMILY MEDICINE CLINIC | Age: 78
End: 2023-05-08

## 2023-05-08 DIAGNOSIS — J32.9 RHINOSINUSITIS: Primary | ICD-10-CM

## 2023-05-08 DIAGNOSIS — H66.90 ACUTE OTITIS MEDIA, UNSPECIFIED OTITIS MEDIA TYPE: ICD-10-CM

## 2023-05-08 DIAGNOSIS — J31.0 RHINOSINUSITIS: Primary | ICD-10-CM

## 2023-05-08 RX ORDER — AMOXICILLIN AND CLAVULANATE POTASSIUM 875; 125 MG/1; MG/1
1 TABLET, FILM COATED ORAL 2 TIMES DAILY
Qty: 10 TABLET | Refills: 0 | Status: SHIPPED | OUTPATIENT
Start: 2023-05-08 | End: 2023-05-13

## 2023-05-08 NOTE — TELEPHONE ENCOUNTER
Pt called in and stated that tomorrow is her last day on her antibiotic and she is still having a productive cough, headache and nasal congestion     Would like to know if she could get another round of antibiotic     Pharmacy - [facility]

## 2023-05-08 NOTE — TELEPHONE ENCOUNTER
Is she taking Zyrtec and Flonase? Is she taking a cough med? What is she taking for the HA?   Stop the Omnicef  Will send in Augmentin

## 2023-05-11 ENCOUNTER — TELEPHONE (OUTPATIENT)
Dept: FAMILY MEDICINE CLINIC | Age: 78
End: 2023-05-11

## 2023-05-11 NOTE — TELEPHONE ENCOUNTER
Pt states she has taken 3 pills of the Augmentin, this am she has diarrhea  She wanted to know if she should continue taking the medication  She is starting to feel better

## 2023-06-21 DIAGNOSIS — I10 HTN, GOAL BELOW 140/90: ICD-10-CM

## 2023-06-21 RX ORDER — METOPROLOL SUCCINATE 25 MG/1
25 TABLET, EXTENDED RELEASE ORAL DAILY
Qty: 90 TABLET | Refills: 3 | Status: SHIPPED | OUTPATIENT
Start: 2023-06-21

## 2023-06-22 ENCOUNTER — TELEPHONE (OUTPATIENT)
Dept: FAMILY MEDICINE CLINIC | Age: 78
End: 2023-06-22

## 2023-06-22 DIAGNOSIS — M54.50 CHRONIC LOW BACK PAIN, UNSPECIFIED BACK PAIN LATERALITY, UNSPECIFIED WHETHER SCIATICA PRESENT: Primary | ICD-10-CM

## 2023-06-22 DIAGNOSIS — G89.29 CHRONIC LOW BACK PAIN, UNSPECIFIED BACK PAIN LATERALITY, UNSPECIFIED WHETHER SCIATICA PRESENT: Primary | ICD-10-CM

## 2023-06-22 DIAGNOSIS — G89.29 CHRONIC LEFT HIP PAIN: ICD-10-CM

## 2023-06-22 DIAGNOSIS — M25.552 CHRONIC LEFT HIP PAIN: ICD-10-CM

## 2023-07-14 ENCOUNTER — HOSPITAL ENCOUNTER (OUTPATIENT)
Dept: MRI IMAGING | Age: 78
Discharge: HOME | End: 2023-07-14
Payer: MEDICARE

## 2023-07-14 DIAGNOSIS — M25.552 CHRONIC LEFT HIP PAIN: ICD-10-CM

## 2023-07-14 DIAGNOSIS — G89.29 CHRONIC LEFT HIP PAIN: ICD-10-CM

## 2023-07-14 DIAGNOSIS — G89.29 CHRONIC LOW BACK PAIN, UNSPECIFIED BACK PAIN LATERALITY, UNSPECIFIED WHETHER SCIATICA PRESENT: ICD-10-CM

## 2023-07-14 DIAGNOSIS — M54.50 CHRONIC LOW BACK PAIN, UNSPECIFIED BACK PAIN LATERALITY, UNSPECIFIED WHETHER SCIATICA PRESENT: ICD-10-CM

## 2023-07-14 PROCEDURE — 72148 MRI LUMBAR SPINE W/O DYE: CPT

## 2023-07-14 PROCEDURE — 73721 MRI JNT OF LWR EXTRE W/O DYE: CPT

## 2023-07-17 ENCOUNTER — TELEPHONE (OUTPATIENT)
Dept: FAMILY MEDICINE CLINIC | Age: 78
End: 2023-07-17

## 2023-07-17 NOTE — TELEPHONE ENCOUNTER
Patient informed and verbalized understanding.  States that she wants to hold off on the Ortho referral at the moment, she has an appt with her Chiropractor today and wants to see what he thinks first

## 2023-07-17 NOTE — TELEPHONE ENCOUNTER
----- Message from TESS Johnson CNP sent at 7/17/2023  7:56 AM EDT -----  MRI back showed some narrowing at L4-5 due to bulging disc. Also showed some degenerative changes bilateral SI joints. Other imaging showed moderate degenerative changes in both hip and possibly a labral tear on the left side. Would she want a referral to Ortho? Also showed a possible uterine polyp and fibroid. She can follow up with her Gyn about this.

## 2023-07-19 ENCOUNTER — HOSPITAL ENCOUNTER (OUTPATIENT)
Dept: WOMENS IMAGING | Age: 78
Discharge: HOME | End: 2023-07-19
Payer: MEDICARE

## 2023-07-19 DIAGNOSIS — Z12.31 VISIT FOR SCREENING MAMMOGRAM: ICD-10-CM

## 2023-07-19 PROCEDURE — 77063 BREAST TOMOSYNTHESIS BI: CPT

## 2023-07-27 DIAGNOSIS — K21.9 GASTROESOPHAGEAL REFLUX DISEASE WITHOUT ESOPHAGITIS: ICD-10-CM

## 2023-07-27 RX ORDER — OMEPRAZOLE 20 MG/1
20 CAPSULE, DELAYED RELEASE ORAL DAILY
Qty: 90 CAPSULE | Refills: 3 | Status: SHIPPED | OUTPATIENT
Start: 2023-07-27

## 2023-07-27 NOTE — TELEPHONE ENCOUNTER
Recent Visits  Date Type Provider Dept   05/02/23 Office Visit Luis Antonio Dural, APRN - CNP [facility]   04/03/23 Office Visit Luis Antonio Dural, APRN - CNP [facility]   02/21/23 Office Visit Luis Antonio Dural, APRN - CNP [facility]   01/10/23 Office Visit Luis Antonio Dural, APRN - CNP [facility]   09/12/22 Office Visit Laura Vick, [address] [place] [address]   06/23/22 Office Visit Laura Vick DO [facility] [address] recent visits within past 540 days with a meds authorizing provider and meeting all other requirements  Future Appointments  No visits were found meeting these conditions. Showing future appointments within next 150 days with a meds authorizing provider and meeting all other requirements   No future appointments.

## 2023-08-21 DIAGNOSIS — I10 HTN, GOAL BELOW 140/90: ICD-10-CM

## 2023-08-21 RX ORDER — LOSARTAN POTASSIUM 100 MG/1
100 TABLET ORAL DAILY
Qty: 90 TABLET | Refills: 3 | Status: SHIPPED | OUTPATIENT
Start: 2023-08-21

## 2023-08-21 NOTE — TELEPHONE ENCOUNTER
Recent Visits  Date Type Provider Dept   05/02/23 Office Visit TESS Ricks CNP [facility]   04/03/23 Office Visit TESS Ricks CNP [facility]   02/21/23 Office Visit TESS Ricks CNP [facility]   01/10/23 Office Visit TESS Ricks CNP [facility]   09/12/22 Office Visit Kingston Gomez DO [facility]  [address]   06/23/22 Office Visit Kingston Gomez DO [facility] [address] recent visits within past 540 days with a meds authorizing provider and meeting all other requirements  Future Appointments  Date Type Provider Dept   08/22/23 Appointment TESS Ricks CNP [facility]   Showing future appointments within next 150 days with a meds authorizing provider and meeting all other requirements     Future Appointments   Date Time Provider [address]   8/22/2023 10:00 AM Ashok Cardenas, [address] [facility]

## 2023-08-22 ENCOUNTER — OFFICE VISIT (OUTPATIENT)
Dept: FAMILY MEDICINE CLINIC | Age: 78
End: 2023-08-22
Payer: MEDICARE

## 2023-08-22 VITALS
WEIGHT: 242.2 LBS | BODY MASS INDEX: 45.73 KG/M2 | DIASTOLIC BLOOD PRESSURE: 80 MMHG | HEIGHT: 61 IN | OXYGEN SATURATION: 97 % | HEART RATE: 100 BPM | SYSTOLIC BLOOD PRESSURE: 132 MMHG | TEMPERATURE: 97.5 F | RESPIRATION RATE: 18 BRPM

## 2023-08-22 DIAGNOSIS — M25.551 BILATERAL HIP PAIN: ICD-10-CM

## 2023-08-22 DIAGNOSIS — M25.552 BILATERAL HIP PAIN: ICD-10-CM

## 2023-08-22 DIAGNOSIS — R51.9 PERSISTENT HEADACHES: Primary | ICD-10-CM

## 2023-08-22 PROCEDURE — 1036F TOBACCO NON-USER: CPT | Performed by: NURSE PRACTITIONER

## 2023-08-22 PROCEDURE — G8427 DOCREV CUR MEDS BY ELIG CLIN: HCPCS | Performed by: NURSE PRACTITIONER

## 2023-08-22 PROCEDURE — 99214 OFFICE O/P EST MOD 30 MIN: CPT | Performed by: NURSE PRACTITIONER

## 2023-08-22 PROCEDURE — 3075F SYST BP GE 130 - 139MM HG: CPT | Performed by: NURSE PRACTITIONER

## 2023-08-22 PROCEDURE — G8399 PT W/DXA RESULTS DOCUMENT: HCPCS | Performed by: NURSE PRACTITIONER

## 2023-08-22 PROCEDURE — G8417 CALC BMI ABV UP PARAM F/U: HCPCS | Performed by: NURSE PRACTITIONER

## 2023-08-22 PROCEDURE — 3079F DIAST BP 80-89 MM HG: CPT | Performed by: NURSE PRACTITIONER

## 2023-08-22 PROCEDURE — 1090F PRES/ABSN URINE INCON ASSESS: CPT | Performed by: NURSE PRACTITIONER

## 2023-08-22 PROCEDURE — 1123F ACP DISCUSS/DSCN MKR DOCD: CPT | Performed by: NURSE PRACTITIONER

## 2023-08-22 RX ORDER — MELOXICAM 15 MG/1
15 TABLET ORAL DAILY PRN
Qty: 90 TABLET | Refills: 3 | Status: SHIPPED | OUTPATIENT
Start: 2023-08-22

## 2023-08-22 NOTE — PROGRESS NOTES
above treatments  -Patient advised to contact our office immediately if symptoms worsen or persist  -Patient counseled on conservative care including fluids, rest and OTC meds  -Patient instructed to go to nearest ED if symptoms worsen or she develops fever, severe neck stiffness, extremity weakness, or change in mental status.

## 2023-08-24 ENCOUNTER — HOSPITAL ENCOUNTER (OUTPATIENT)
Age: 78
Discharge: HOME | End: 2023-08-24
Payer: MEDICARE

## 2023-08-24 LAB
ANION GAP SERPL CALC-SCNC: 15 MEQ/L (ref 8–16)
BUN SERPL-MCNC: 11 MG/DL (ref 7–22)
CALCIUM SERPL-MCNC: 10 MG/DL (ref 8.5–10.5)
CHLORIDE SERPL-SCNC: 105 MEQ/L (ref 98–111)
CO2 SERPL-SCNC: 20 MEQ/L (ref 23–33)
CREAT SERPL-MCNC: 0.6 MG/DL (ref 0.4–1.2)
DEPRECATED RDW RBC AUTO: 48.9 FL (ref 35–45)
ERYTHROCYTE [DISTWIDTH] IN BLOOD BY AUTOMATED COUNT: 14.4 % (ref 11.5–14.5)
GFR SERPL CREATININE-BSD FRML MDRD: > 60 ML/MIN/1.73M2
GLUCOSE SERPL-MCNC: 128 MG/DL (ref 70–108)
HCT VFR BLD AUTO: 39.2 % (ref 37–47)
HGB BLD-MCNC: 12.1 GM/DL (ref 12–16)
MCH RBC QN AUTO: 28.7 PG (ref 26–33)
MCHC RBC AUTO-ENTMCNC: 30.9 GM/DL (ref 32.2–35.5)
MCV RBC AUTO: 93.1 FL (ref 81–99)
PLATELET # BLD AUTO: 186 THOU/MM3 (ref 130–400)
PMV BLD AUTO: 11.7 FL (ref 9.4–12.4)
POTASSIUM SERPL-SCNC: 4.1 MEQ/L (ref 3.5–5.2)
RBC # BLD AUTO: 4.21 MILL/MM3 (ref 4.2–5.4)
SODIUM SERPL-SCNC: 140 MEQ/L (ref 135–145)
WBC # BLD AUTO: 5.6 THOU/MM3 (ref 4.8–10.8)

## 2023-08-24 PROCEDURE — 85027 COMPLETE CBC AUTOMATED: CPT

## 2023-08-24 PROCEDURE — 93005 ELECTROCARDIOGRAM TRACING: CPT | Performed by: OBSTETRICS & GYNECOLOGY

## 2023-08-24 PROCEDURE — 36415 COLL VENOUS BLD VENIPUNCTURE: CPT

## 2023-08-24 PROCEDURE — 93010 ELECTROCARDIOGRAM REPORT: CPT | Performed by: INTERNAL MEDICINE

## 2023-08-24 PROCEDURE — 80048 BASIC METABOLIC PNL TOTAL CA: CPT

## 2023-08-26 LAB
EKG ATRIAL RATE: 102 BPM
EKG P AXIS: 56 DEGREES
EKG P-R INTERVAL: 188 MS
EKG Q-T INTERVAL: 372 MS
EKG QRS DURATION: 128 MS
EKG QTC CALCULATION (BAZETT): 484 MS
EKG R AXIS: -31 DEGREES
EKG T AXIS: 113 DEGREES
EKG VENTRICULAR RATE: 102 BPM

## 2023-08-29 ENCOUNTER — OFFICE VISIT (OUTPATIENT)
Dept: CARDIOLOGY CLINIC | Age: 78
End: 2023-08-29
Payer: MEDICARE

## 2023-08-29 VITALS
WEIGHT: 238.2 LBS | BODY MASS INDEX: 44.97 KG/M2 | SYSTOLIC BLOOD PRESSURE: 150 MMHG | DIASTOLIC BLOOD PRESSURE: 78 MMHG | HEART RATE: 109 BPM | HEIGHT: 61 IN

## 2023-08-29 DIAGNOSIS — Z01.810 PREOP CARDIOVASCULAR EXAM: Primary | ICD-10-CM

## 2023-08-29 PROCEDURE — G8417 CALC BMI ABV UP PARAM F/U: HCPCS | Performed by: INTERNAL MEDICINE

## 2023-08-29 PROCEDURE — 1090F PRES/ABSN URINE INCON ASSESS: CPT | Performed by: INTERNAL MEDICINE

## 2023-08-29 PROCEDURE — 3078F DIAST BP <80 MM HG: CPT | Performed by: INTERNAL MEDICINE

## 2023-08-29 PROCEDURE — 3077F SYST BP >= 140 MM HG: CPT | Performed by: INTERNAL MEDICINE

## 2023-08-29 PROCEDURE — G8427 DOCREV CUR MEDS BY ELIG CLIN: HCPCS | Performed by: INTERNAL MEDICINE

## 2023-08-29 PROCEDURE — 1036F TOBACCO NON-USER: CPT | Performed by: INTERNAL MEDICINE

## 2023-08-29 PROCEDURE — G8399 PT W/DXA RESULTS DOCUMENT: HCPCS | Performed by: INTERNAL MEDICINE

## 2023-08-29 PROCEDURE — 1123F ACP DISCUSS/DSCN MKR DOCD: CPT | Performed by: INTERNAL MEDICINE

## 2023-08-29 PROCEDURE — 99204 OFFICE O/P NEW MOD 45 MIN: CPT | Performed by: INTERNAL MEDICINE

## 2023-08-29 NOTE — PROGRESS NOTES
[address] [place]  Dept: [phone]  Dept Fax: [phone]  Loc: [phone]    Visit Date: 8/29/2023    Ms. Lora Zambrano is a 70 y.o. female  who presented for:  Chief Complaint   Patient presents with    Cardiac Clearance    New Patient       HPI:   71 yo F c hx of HTN, HLD, and Left mastectomy is here for preop for D&C. Patient has restriction in walking due to her hip pain but has no limitations from shortness of breath. BMI is 45. Denies any chest pain, sob, palpitations, lightheadedness, dizziness, orthopnea, PND or pedal edema. She has a chronic LBBB. She used to follow up with Dr. Ivan Black. EF 50-55%, G1DD, Cath in 2016 showed no significant CAD. Stress test in 2019 showed no ischemia.             Current Outpatient Medications:     meloxicam (MOBIC) 15 MG tablet, Take 1 tablet by mouth daily as needed for Pain, Disp: 90 tablet, Rfl: 3    losartan (COZAAR) 100 MG tablet, Take 1 tablet by mouth daily, Disp: 90 tablet, Rfl: 3    omeprazole (PRILOSEC) 20 MG delayed release capsule, Take 1 capsule by mouth daily, Disp: 90 capsule, Rfl: 3    metoprolol succinate (TOPROL XL) 25 MG extended release tablet, Take 1 tablet by mouth daily, Disp: 90 tablet, Rfl: 3    cetirizine (ZYRTEC) 10 MG tablet, Take 1 tablet by mouth daily, Disp: 90 tablet, Rfl: 3    fluticasone (FLONASE) 50 MCG/ACT nasal spray, 2 sprays by Each Nostril route daily, Disp: 1 each, Rfl: 0    pravastatin (PRAVACHOL) 40 MG tablet, Take 1 tablet by mouth daily, Disp: 90 tablet, Rfl: 3    Coenzyme Q10 (CO Q10) 200 MG CAPS, Take 200 mg by mouth daily, Disp: , Rfl:     amLODIPine (NORVASC) 10 MG tablet, Take 1 tablet by mouth daily, Disp: 90 tablet, Rfl: 3    metFORMIN (GLUCOPHAGE) 500 MG tablet, Take 1 tab daily with breakfast x 2 weeks, then increase to 1 tab BID with meals, Disp: 180 tablet, Rfl: 3    Lancets MISC, Check blood sugar q daily,

## 2023-08-29 NOTE — PROGRESS NOTES
New patient here for cardiac clearance. D+C not scheduled yet but with Dr. Nando Gaston.  Denies cp, palpitations and dizziness     C/o sob states worse at night when getting up in the middle of the night and YASMANI not constant though

## 2023-08-30 ENCOUNTER — TELEPHONE (OUTPATIENT)
Dept: CARDIOLOGY CLINIC | Age: 78
End: 2023-08-30

## 2023-08-30 NOTE — TELEPHONE ENCOUNTER
[facility] called to see if we had received the referral for this patient. I let her know that the patient was seen yesterday. She then asked out the appt went. I let her know that I would request that the OV notes be faxed to them. I saw that clearance forms were faxed yesterday to dr Hakeem Lombardi, she asked what those said I let her know that the box was checked as moderate risk. Can this be refaxed? I also noticed that the box saying patient may proceed for intended surgery is not checked.  Please f/u with referring office to advise

## 2023-09-05 ENCOUNTER — TELEPHONE (OUTPATIENT)
Dept: FAMILY MEDICINE CLINIC | Age: 78
End: 2023-09-05

## 2023-09-05 NOTE — TELEPHONE ENCOUNTER
----- Message from Lisabeth Claude, APRN - CNP sent at 8/22/2023 10:24 AM EDT -----  Check on Meloxicam

## 2023-09-07 ENCOUNTER — HOSPITAL ENCOUNTER (OUTPATIENT)
Dept: MRI IMAGING | Age: 78
Discharge: HOME | End: 2023-09-07
Payer: MEDICARE

## 2023-09-07 DIAGNOSIS — R51.9 PERSISTENT HEADACHES: ICD-10-CM

## 2023-09-07 PROCEDURE — 70551 MRI BRAIN STEM W/O DYE: CPT

## 2023-09-07 NOTE — TELEPHONE ENCOUNTER
Pt states she has not started the meloxicam due to the metformin causing diarrhea. Pt had stopped the metformin a week ago and the diarrhea has subsided. Pt is asking for a new medication to replace the metformin.     Please advise      [employer]

## 2023-09-11 ENCOUNTER — TELEPHONE (OUTPATIENT)
Dept: FAMILY MEDICINE CLINIC | Age: 78
End: 2023-09-11

## 2023-09-11 NOTE — TELEPHONE ENCOUNTER
Since her A1c wasn't very high and she had s/e with the Metfomin, would she want to trial diet changes instead? This would mean following a lower carb diet and keeping track of her carb intake? She would aim to keep her overall daily carb intake under 100 grams per day. This will bring her a1c down and help with weight loss too.

## 2023-09-11 NOTE — TELEPHONE ENCOUNTER
----- Message from TESS Smith CNP sent at 9/11/2023  7:20 AM EDT -----  MRI showed chronic changes. Would she want to see Neurology for the headaches?

## 2023-10-20 ENCOUNTER — TELEPHONE (OUTPATIENT)
Dept: CARDIOLOGY CLINIC | Age: 78
End: 2023-10-20

## 2023-10-20 NOTE — TELEPHONE ENCOUNTER
Nurse from Dr. Pooja Baez office called. Pt is scheduled for surgery in January. She mentioned pt was seen in August for clearance, however, it looks like the clearance was for a different surgery with a different doctor. Called Dr. Mary Cottrell office, left voicemail with the information that we do not have a request for clearance for this surgery. Gave them our fax number to send over the clearance request for this patient.   Dr. Mary Cottrell office phone number is [phone]  Fax number is [phone]

## 2023-11-14 DIAGNOSIS — R39.15 URGENCY OF URINATION: ICD-10-CM

## 2023-11-14 RX ORDER — SOLIFENACIN SUCCINATE 5 MG/1
10 TABLET, FILM COATED ORAL DAILY
Qty: 180 TABLET | Refills: 3 | Status: SHIPPED | OUTPATIENT
Start: 2023-11-14

## 2023-11-14 NOTE — TELEPHONE ENCOUNTER
Received a fax from ortho neuro to schedule pre-op appointment surgery scheduled 01.29.2024  Patient already scheduled   Future Appointments   Date Time Provider [facility] [address]   1/16/2024  9:00 AM TESS Mercedes - CNP [address] [facility]       Patient is requesting the following to go to [facility]   Please approve or refuse Rx request:  Requested Prescriptions     Pending Prescriptions Disp Refills    solifenacin (VESICARE) 5 MG tablet 180 tablet 3     Sig: Take 2 tablets by mouth daily       Next appointment:  1/16/2024

## 2023-11-30 ENCOUNTER — TELEPHONE (OUTPATIENT)
Dept: FAMILY MEDICINE CLINIC | Age: 78
End: 2023-11-30

## 2023-11-30 NOTE — TELEPHONE ENCOUNTER
LMOM for patient to return a call back to R/S 12.01.2023 appt  Per provider 12.01.2023 need sto be R/S for a different day  LMOM on home phone as well as mobile #

## 2023-11-30 NOTE — TELEPHONE ENCOUNTER
Future Appointments   Date Time Provider [address]   12/5/2023 12:40 PM TESS Gunn CNP Med [facility] November 1/16/2024  9:00 AM TESS Gunn CNP [facility] [address] [facility]

## 2023-12-07 ENCOUNTER — OFFICE VISIT (OUTPATIENT)
Dept: FAMILY MEDICINE CLINIC | Age: 78
End: 2023-12-07
Payer: MEDICARE

## 2023-12-07 VITALS
WEIGHT: 244.4 LBS | RESPIRATION RATE: 10 BRPM | DIASTOLIC BLOOD PRESSURE: 76 MMHG | BODY MASS INDEX: 46.14 KG/M2 | HEART RATE: 94 BPM | OXYGEN SATURATION: 93 % | HEIGHT: 61 IN | TEMPERATURE: 97.7 F | SYSTOLIC BLOOD PRESSURE: 128 MMHG

## 2023-12-07 DIAGNOSIS — R09.82 POST-NASAL DRIP: ICD-10-CM

## 2023-12-07 DIAGNOSIS — Z13.220 SCREENING FOR HYPERLIPIDEMIA: ICD-10-CM

## 2023-12-07 DIAGNOSIS — E11.9 DIET-CONTROLLED TYPE 2 DIABETES MELLITUS (HCC): Primary | ICD-10-CM

## 2023-12-07 DIAGNOSIS — E78.5 HYPERLIPIDEMIA, UNSPECIFIED HYPERLIPIDEMIA TYPE: ICD-10-CM

## 2023-12-07 DIAGNOSIS — R51.9 SINUS HEADACHE: ICD-10-CM

## 2023-12-07 PROBLEM — C44.90 SKIN CANCER: Status: ACTIVE | Noted: 2021-08-25

## 2023-12-07 LAB — HBA1C MFR BLD: 7.3 % (ref 4.3–5.7)

## 2023-12-07 PROCEDURE — G8417 CALC BMI ABV UP PARAM F/U: HCPCS | Performed by: NURSE PRACTITIONER

## 2023-12-07 PROCEDURE — G8484 FLU IMMUNIZE NO ADMIN: HCPCS | Performed by: NURSE PRACTITIONER

## 2023-12-07 PROCEDURE — 83036 HEMOGLOBIN GLYCOSYLATED A1C: CPT | Performed by: NURSE PRACTITIONER

## 2023-12-07 PROCEDURE — 99214 OFFICE O/P EST MOD 30 MIN: CPT | Performed by: NURSE PRACTITIONER

## 2023-12-07 PROCEDURE — 3078F DIAST BP <80 MM HG: CPT | Performed by: NURSE PRACTITIONER

## 2023-12-07 PROCEDURE — 1123F ACP DISCUSS/DSCN MKR DOCD: CPT | Performed by: NURSE PRACTITIONER

## 2023-12-07 PROCEDURE — 3051F HG A1C>EQUAL 7.0%<8.0%: CPT | Performed by: NURSE PRACTITIONER

## 2023-12-07 PROCEDURE — 1036F TOBACCO NON-USER: CPT | Performed by: NURSE PRACTITIONER

## 2023-12-07 PROCEDURE — 1090F PRES/ABSN URINE INCON ASSESS: CPT | Performed by: NURSE PRACTITIONER

## 2023-12-07 PROCEDURE — 3074F SYST BP LT 130 MM HG: CPT | Performed by: NURSE PRACTITIONER

## 2023-12-07 PROCEDURE — G8427 DOCREV CUR MEDS BY ELIG CLIN: HCPCS | Performed by: NURSE PRACTITIONER

## 2023-12-07 PROCEDURE — G8399 PT W/DXA RESULTS DOCUMENT: HCPCS | Performed by: NURSE PRACTITIONER

## 2023-12-07 RX ORDER — FLUTICASONE PROPIONATE 50 MCG
2 SPRAY, SUSPENSION (ML) NASAL DAILY
Qty: 1 EACH | Refills: 3 | Status: SHIPPED | OUTPATIENT
Start: 2023-12-07

## 2024-01-09 ENCOUNTER — NURSE ONLY (OUTPATIENT)
Dept: LAB | Age: 79
End: 2024-01-09

## 2024-01-09 DIAGNOSIS — E11.9 DIET-CONTROLLED TYPE 2 DIABETES MELLITUS (HCC): ICD-10-CM

## 2024-01-09 DIAGNOSIS — Z13.220 SCREENING FOR HYPERLIPIDEMIA: ICD-10-CM

## 2024-01-09 DIAGNOSIS — E78.5 HYPERLIPIDEMIA, UNSPECIFIED HYPERLIPIDEMIA TYPE: ICD-10-CM

## 2024-01-09 LAB
ALBUMIN SERPL BCG-MCNC: 4.6 G/DL (ref 3.5–5.1)
ALP SERPL-CCNC: 98 U/L (ref 38–126)
ALT SERPL W/O P-5'-P-CCNC: 41 U/L (ref 11–66)
ANION GAP SERPL CALC-SCNC: 16 MEQ/L (ref 8–16)
AST SERPL-CCNC: 44 U/L (ref 5–40)
BILIRUB SERPL-MCNC: 0.7 MG/DL (ref 0.3–1.2)
BUN SERPL-MCNC: 11 MG/DL (ref 7–22)
CALCIUM SERPL-MCNC: 10.1 MG/DL (ref 8.5–10.5)
CHLORIDE SERPL-SCNC: 101 MEQ/L (ref 98–111)
CHOLEST SERPL-MCNC: 216 MG/DL (ref 100–199)
CO2 SERPL-SCNC: 24 MEQ/L (ref 23–33)
CREAT SERPL-MCNC: 0.7 MG/DL (ref 0.4–1.2)
GFR SERPL CREATININE-BSD FRML MDRD: > 60 ML/MIN/1.73M2
GLUCOSE SERPL-MCNC: 110 MG/DL (ref 70–108)
HDLC SERPL-MCNC: 71 MG/DL
LDLC SERPL CALC-MCNC: 122 MG/DL
POTASSIUM SERPL-SCNC: 4.2 MEQ/L (ref 3.5–5.2)
PROT SERPL-MCNC: 8 G/DL (ref 6.1–8)
SODIUM SERPL-SCNC: 141 MEQ/L (ref 135–145)
TRIGL SERPL-MCNC: 113 MG/DL (ref 0–199)

## 2024-01-11 ENCOUNTER — TELEPHONE (OUTPATIENT)
Dept: FAMILY MEDICINE CLINIC | Age: 79
End: 2024-01-11

## 2024-01-11 NOTE — TELEPHONE ENCOUNTER
----- Message from Sayra Cottrell APRN - CNP sent at 1/10/2024  5:18 PM EST -----  Labs were stable except LDL (bad fat) was a little elevated.  Continue statin.  I recommend following a low fat diet, exercise at least 30 min/day 3-5 days a week, try to include foods rich in Omega-3 fatty acids (salmon, walnuts, flaxseed, etc) and soluble fiber (oatmeal, kidney beans, apples, etc) in your diet.     The 10-year ASCVD risk score (Hiro FORMAN, et al., 2019) is: 40.7%    Values used to calculate the score:      Age: 78 years      Sex: Female      Is Non- : No      Diabetic: Yes      Tobacco smoker: No      Systolic Blood Pressure: 128 mmHg      Is BP treated: Yes      HDL Cholesterol: 71 mg/dL      Total Cholesterol: 216 mg/dL

## 2024-02-13 DIAGNOSIS — E11.9 DIET-CONTROLLED TYPE 2 DIABETES MELLITUS (HCC): ICD-10-CM

## 2024-02-13 RX ORDER — TIRZEPATIDE 2.5 MG/.5ML
INJECTION, SOLUTION SUBCUTANEOUS
Qty: 6 ML | Refills: 3 | Status: SHIPPED | OUTPATIENT
Start: 2024-02-13

## 2024-02-13 NOTE — TELEPHONE ENCOUNTER
Recent Visits  Date Type Provider Dept   12/07/23 Office Visit Sayra Cottrell, APRN - CNP [facility]   08/22/23 Office Visit Syara Cottrell, APRN - CNP [facility]   05/02/23 Office Visit Sayra Cottrell, APRN - CNP [facility]   04/03/23 Office Visit Sayra Cottrell, APRN - CNP [facility]   02/21/23 Office Visit Sayra Cottrell, APRN - CNP [facility]   01/10/23 Office Visit Sayra Cottrell, APRN - CNP [facility]   09/12/22 Office Visit Inocente Clinton, DO [facility]   Showing recent visits within past 540 days with a meds authorizing provider and meeting all other requirements  Future Appointments  Date Type Provider Dept   02/20/24 Appointment Sayra Cottrell, APRN - CNP [facility]   Showing future appointments within next 150 days with a meds authorizing provider and meeting all other requirements

## 2024-02-20 ENCOUNTER — OFFICE VISIT (OUTPATIENT)
Dept: FAMILY MEDICINE CLINIC | Age: 79
End: 2024-02-20
Payer: MEDICARE

## 2024-02-20 VITALS
BODY MASS INDEX: 44.37 KG/M2 | WEIGHT: 235 LBS | DIASTOLIC BLOOD PRESSURE: 78 MMHG | OXYGEN SATURATION: 94 % | HEIGHT: 61 IN | SYSTOLIC BLOOD PRESSURE: 124 MMHG | TEMPERATURE: 98.1 F | RESPIRATION RATE: 14 BRPM | HEART RATE: 106 BPM

## 2024-02-20 DIAGNOSIS — E66.01 OBESITY, CLASS III, BMI 40-49.9 (MORBID OBESITY) (HCC): ICD-10-CM

## 2024-02-20 DIAGNOSIS — K59.00 CONSTIPATION, UNSPECIFIED CONSTIPATION TYPE: Primary | ICD-10-CM

## 2024-02-20 DIAGNOSIS — E11.69 TYPE 2 DIABETES MELLITUS WITH OTHER SPECIFIED COMPLICATION, WITHOUT LONG-TERM CURRENT USE OF INSULIN (HCC): ICD-10-CM

## 2024-02-20 LAB — HBA1C MFR BLD: 7.2 % (ref 4.3–5.7)

## 2024-02-20 PROCEDURE — 1123F ACP DISCUSS/DSCN MKR DOCD: CPT | Performed by: NURSE PRACTITIONER

## 2024-02-20 PROCEDURE — 3078F DIAST BP <80 MM HG: CPT | Performed by: NURSE PRACTITIONER

## 2024-02-20 PROCEDURE — G8399 PT W/DXA RESULTS DOCUMENT: HCPCS | Performed by: NURSE PRACTITIONER

## 2024-02-20 PROCEDURE — 3051F HG A1C>EQUAL 7.0%<8.0%: CPT | Performed by: NURSE PRACTITIONER

## 2024-02-20 PROCEDURE — G8417 CALC BMI ABV UP PARAM F/U: HCPCS | Performed by: NURSE PRACTITIONER

## 2024-02-20 PROCEDURE — G8484 FLU IMMUNIZE NO ADMIN: HCPCS | Performed by: NURSE PRACTITIONER

## 2024-02-20 PROCEDURE — 3074F SYST BP LT 130 MM HG: CPT | Performed by: NURSE PRACTITIONER

## 2024-02-20 PROCEDURE — G8427 DOCREV CUR MEDS BY ELIG CLIN: HCPCS | Performed by: NURSE PRACTITIONER

## 2024-02-20 PROCEDURE — 99214 OFFICE O/P EST MOD 30 MIN: CPT | Performed by: NURSE PRACTITIONER

## 2024-02-20 PROCEDURE — 1090F PRES/ABSN URINE INCON ASSESS: CPT | Performed by: NURSE PRACTITIONER

## 2024-02-20 PROCEDURE — 1036F TOBACCO NON-USER: CPT | Performed by: NURSE PRACTITIONER

## 2024-02-20 RX ORDER — DOCUSATE SODIUM 100 MG/1
100 CAPSULE, LIQUID FILLED ORAL 2 TIMES DAILY PRN
Qty: 180 CAPSULE | Refills: 3 | Status: SHIPPED | OUTPATIENT
Start: 2024-02-20

## 2024-02-20 RX ORDER — TIRZEPATIDE 5 MG/.5ML
5 INJECTION, SOLUTION SUBCUTANEOUS WEEKLY
Qty: 2 ML | Refills: 2 | Status: SHIPPED | OUTPATIENT
Start: 2024-02-20

## 2024-02-20 RX ORDER — POLYETHYLENE GLYCOL 3350 17 G/17G
17 POWDER, FOR SOLUTION ORAL DAILY PRN
Qty: 1530 G | Refills: 1 | Status: SHIPPED | OUTPATIENT
Start: 2024-02-20

## 2024-02-20 ASSESSMENT — PATIENT HEALTH QUESTIONNAIRE - PHQ9
SUM OF ALL RESPONSES TO PHQ QUESTIONS 1-9: 0
SUM OF ALL RESPONSES TO PHQ QUESTIONS 1-9: 0
SUM OF ALL RESPONSES TO PHQ9 QUESTIONS 1 & 2: 0
SUM OF ALL RESPONSES TO PHQ QUESTIONS 1-9: 0
SUM OF ALL RESPONSES TO PHQ QUESTIONS 1-9: 0
1. LITTLE INTEREST OR PLEASURE IN DOING THINGS: 0
2. FEELING DOWN, DEPRESSED OR HOPELESS: 0

## 2024-02-20 NOTE — PROGRESS NOTES
Dyana Mancera is a 78 y.o. female for talk about mounjaro      Diabetes Type 2    Glucose control:   Does patient check blood glucoses at home?  Yes - but not regularly  Report of hypoglycemia: no  Lab Results   Component Value Date    LABA1C 7.2 (H) 02/20/2024     Lab Results   Component Value Date     (H) 06/09/2021       Symptoms  Polyuria, Polydipsia or Polyphagia?   No  Chest Pain, SOB, or Palpitations? -  No  [facility] complaints? Yes - utd on eye appt  Paresthesias of the extremities?  No    Medications  Current medication were reviewed.  Compliant with medications?  no  Medication side effects?  No  On ACE-I or ARB?  Yes  On antiplatelet therapy?  Yes  On Statin?  Yes    Last Diabetic Eye Exam: utd    Exercise  Exercise? No  Wt Readings from Last 3 Encounters:   02/20/24 106.6 kg (235 lb)   12/07/23 110.9 kg (244 lb 6.4 oz)   08/29/23 108 kg (238 lb 3.2 oz)       Diet discipline?:  Low salt, fat, sugar diet?  no    Blood pressure control:  BP Readings from Last 3 Encounters:   02/20/24 124/78   12/07/23 128/76   08/29/23 (!) 150/78       No results found for: \"[id number]\"    Lab Results   Component Value Date    LDLCALC 122 01/09/2024    LDLDIRECT 142 (H) 04/06/2018     HTN    Does patient check BP regularly at home? - Yes - on and off, usually 120-140s/80-90s  Current Medication regimen - Amlodipine, Losartan, Metoprolol  Tolerating medications well? - yes    Shortness of breath or chest pain? No  Headache or visual complaints? No  Neurologic changes like confusion? No  Extremity edema? No    BP Readings from Last 3 Encounters:   02/20/24 124/78   12/07/23 128/76   08/29/23 (!) 150/78         Physical Exam    /78   Pulse (!) 106   Temp 98.1 °F (36.7 °C) (Oral)   Resp 14   Ht 1.549 m (5' 1\")   Wt 106.6 kg (235 lb)   SpO2 94%   BMI 44.40 kg/m²   Appearance: alert, well appearing, and in no distress, normal appearing weight and well hydrated.  Neck exam - supple, no significant

## 2024-02-29 ENCOUNTER — TELEPHONE (OUTPATIENT)
Dept: FAMILY MEDICINE CLINIC | Age: 79
End: 2024-02-29

## 2024-02-29 NOTE — TELEPHONE ENCOUNTER
Pt called In and said that she is having cold spells and has started itching.   Thinks it is from her mounjaro. Not sure when it started, but did not have the cold spells when she first started Mounjaro

## 2024-03-01 NOTE — TELEPHONE ENCOUNTER
Called patient and states she is going to monitor her eye and if it gets worse she will contact her eye specialist.     She is going to stop the mounjaro and on Monday she is going to call us and give us an  update on if the symptoms she was having stopped and if it was the Mounjaro or not.

## 2024-03-01 NOTE — TELEPHONE ENCOUNTER
She mentioned cold spells at her last visit so I think she already had them before starting the mounjaro.  I told her it is common with taking blood thinners.  She is on aspirin.    Did the itching start after the mounjaro?  Does she have a rash?

## 2024-03-01 NOTE — TELEPHONE ENCOUNTER
Pt states the Itching started after mounjaro.    Pt denies rash, itching has been a lot. Pt noticed after taking mounjaro she has itchy and cold spells, at the end of the week it dies down some.     Also having left eye in the morning she has trouble getting it focused, as the day goes on she can get it to be more focused. Stated feels like it has sand or something in it at times.

## 2024-03-01 NOTE — TELEPHONE ENCOUNTER
If the eye issue has been ongoing, would recommend she see the eye doctor  Does she want to stop the Mounjaro and see if the itching and cold spells stop?

## 2024-03-05 ENCOUNTER — TELEPHONE (OUTPATIENT)
Dept: FAMILY MEDICINE CLINIC | Age: 79
End: 2024-03-05

## 2024-03-05 DIAGNOSIS — L29.9 ITCHING: Primary | ICD-10-CM

## 2024-03-05 NOTE — TELEPHONE ENCOUNTER
Pt stated she did not take the Mounjaro on 3/2/24 but is still having the itching through out her whole body.    Pt is asking for thyroid labs to be ordered. Pt stated how she feels may be related to thyroid issues and it's been a few years since she has had them checked.      Please advise

## 2024-03-06 ENCOUNTER — NURSE ONLY (OUTPATIENT)
Dept: LAB | Age: 79
End: 2024-03-06

## 2024-03-06 DIAGNOSIS — L29.9 ITCHING: ICD-10-CM

## 2024-03-06 LAB
T4 FREE SERPL-MCNC: 1.15 NG/DL (ref 0.93–1.68)
TSH SERPL DL<=0.005 MIU/L-ACNC: 1.64 UIU/ML (ref 0.4–4.2)

## 2024-03-06 NOTE — TELEPHONE ENCOUNTER
Recommend she restart the Zyrtec since she is having itching all over.  Could be a reaction and Zyrtec may help it.

## 2024-03-06 NOTE — TELEPHONE ENCOUNTER
Pt informed and verbalized understanding     Pt will go to [facility] lab and orders in Epic     Pt is not taking Zyrtec

## 2024-03-07 ENCOUNTER — TELEPHONE (OUTPATIENT)
Dept: FAMILY MEDICINE CLINIC | Age: 79
End: 2024-03-07

## 2024-03-07 NOTE — TELEPHONE ENCOUNTER
----- Message from TESS Jang CNP sent at 3/7/2024 12:52 PM EST -----  Thyroid studies were normal.

## 2024-03-12 ENCOUNTER — OFFICE VISIT (OUTPATIENT)
Dept: FAMILY MEDICINE CLINIC | Age: 79
End: 2024-03-12
Payer: MEDICARE

## 2024-03-12 VITALS
HEIGHT: 61 IN | SYSTOLIC BLOOD PRESSURE: 124 MMHG | RESPIRATION RATE: 12 BRPM | BODY MASS INDEX: 44.25 KG/M2 | OXYGEN SATURATION: 95 % | WEIGHT: 234.4 LBS | HEART RATE: 95 BPM | DIASTOLIC BLOOD PRESSURE: 82 MMHG | TEMPERATURE: 97.9 F

## 2024-03-12 DIAGNOSIS — E11.69 TYPE 2 DIABETES MELLITUS WITH OTHER SPECIFIED COMPLICATION, WITHOUT LONG-TERM CURRENT USE OF INSULIN (HCC): ICD-10-CM

## 2024-03-12 DIAGNOSIS — J02.9 SORE THROAT: ICD-10-CM

## 2024-03-12 DIAGNOSIS — R51.9 NONINTRACTABLE HEADACHE, UNSPECIFIED CHRONICITY PATTERN, UNSPECIFIED HEADACHE TYPE: ICD-10-CM

## 2024-03-12 DIAGNOSIS — H93.8X3 CONGESTION OF BOTH EARS: Primary | ICD-10-CM

## 2024-03-12 DIAGNOSIS — R05.1 ACUTE COUGH: ICD-10-CM

## 2024-03-12 LAB
INFLUENZA VIRUS A RNA: NEGATIVE
INFLUENZA VIRUS B RNA: NEGATIVE
Lab: NORMAL
QC PASS/FAIL: NORMAL
SARS-COV-2 RDRP RESP QL NAA+PROBE: NEGATIVE

## 2024-03-12 PROCEDURE — 99214 OFFICE O/P EST MOD 30 MIN: CPT | Performed by: NURSE PRACTITIONER

## 2024-03-12 PROCEDURE — G8484 FLU IMMUNIZE NO ADMIN: HCPCS | Performed by: NURSE PRACTITIONER

## 2024-03-12 PROCEDURE — 87502 INFLUENZA DNA AMP PROBE: CPT | Performed by: NURSE PRACTITIONER

## 2024-03-12 PROCEDURE — 1090F PRES/ABSN URINE INCON ASSESS: CPT | Performed by: NURSE PRACTITIONER

## 2024-03-12 PROCEDURE — G8417 CALC BMI ABV UP PARAM F/U: HCPCS | Performed by: NURSE PRACTITIONER

## 2024-03-12 PROCEDURE — 3079F DIAST BP 80-89 MM HG: CPT | Performed by: NURSE PRACTITIONER

## 2024-03-12 PROCEDURE — 1123F ACP DISCUSS/DSCN MKR DOCD: CPT | Performed by: NURSE PRACTITIONER

## 2024-03-12 PROCEDURE — 1036F TOBACCO NON-USER: CPT | Performed by: NURSE PRACTITIONER

## 2024-03-12 PROCEDURE — 87635 SARS-COV-2 COVID-19 AMP PRB: CPT | Performed by: NURSE PRACTITIONER

## 2024-03-12 PROCEDURE — G8399 PT W/DXA RESULTS DOCUMENT: HCPCS | Performed by: NURSE PRACTITIONER

## 2024-03-12 PROCEDURE — 3051F HG A1C>EQUAL 7.0%<8.0%: CPT | Performed by: NURSE PRACTITIONER

## 2024-03-12 PROCEDURE — G8427 DOCREV CUR MEDS BY ELIG CLIN: HCPCS | Performed by: NURSE PRACTITIONER

## 2024-03-12 PROCEDURE — 3074F SYST BP LT 130 MM HG: CPT | Performed by: NURSE PRACTITIONER

## 2024-03-12 RX ORDER — PREDNISONE 20 MG/1
40 TABLET ORAL DAILY
Qty: 10 TABLET | Refills: 0 | Status: SHIPPED | OUTPATIENT
Start: 2024-03-12 | End: 2024-03-17

## 2024-03-12 SDOH — ECONOMIC STABILITY: FOOD INSECURITY: WITHIN THE PAST 12 MONTHS, YOU WORRIED THAT YOUR FOOD WOULD RUN OUT BEFORE YOU GOT MONEY TO BUY MORE.: NEVER TRUE

## 2024-03-12 SDOH — ECONOMIC STABILITY: FOOD INSECURITY: WITHIN THE PAST 12 MONTHS, THE FOOD YOU BOUGHT JUST DIDN'T LAST AND YOU DIDN'T HAVE MONEY TO GET MORE.: NEVER TRUE

## 2024-03-12 SDOH — ECONOMIC STABILITY: INCOME INSECURITY: HOW HARD IS IT FOR YOU TO PAY FOR THE VERY BASICS LIKE FOOD, HOUSING, MEDICAL CARE, AND HEATING?: NOT HARD AT ALL

## 2024-03-12 NOTE — PROGRESS NOTES
SUBJECTIVE:  Dyana Mancera is a 78 y.o. y/o female that presents with Congestion (Started with sore throat Saturday, ear pressure )  .    HPI:      Symptoms have been present for 4 day(s).  Symptoms are worse since they initially started.    Fever? No  Runny nose or congestion?  Yes  Cough?  Yes  Sore throat?  Yes  Shortness of breath/Wheezing? No  Other associated symptoms?  ear pain and headaches      Known COVID exposures or RFs?  unknown  Smoker?  No  Preexisting Respiratory conditions?  none      Past Medical History:   Diagnosis Date    Acid reflux     Arthritis     Back pain     Breast cancer (HCC) 2014    left age 69    Cancer (HCC)     breast left    Constipation     Hyperlipidemia     Hypertension     Kidney cysts     Lung mass 2013    left lung no longer there (when they went to do biopsy it was gone)    Ulcer          Tobacco Use      Smoking status: Never      Smokeless tobacco: Never            OBJECTIVE:  /82   Pulse 95   Temp 97.9 °F (36.6 °C) (Oral)   Resp 12   Ht 1.549 m (5' 1\")   Wt 106.3 kg (234 lb 6.4 oz)   SpO2 95%   BMI 44.29 kg/m²   General appearance: alert, well appearing, and in no distress.  ENT exam reveals - pharynx erythematous without exudate and nasal mucosa congested.   CVS exam: normal rate, regular rhythm, normal S1, S2, no murmurs, rubs, clicks or gallops.  Chest:clear to auscultation, no wheezes, rales or rhonchi, symmetric air entry.   Abdominal exam: soft, nontender, nondistended, no masses or organomegaly.  Extremities:  No clubbing, cyanosis or edema  Skin exam - normal coloration and turgor, no rashes, no suspicious skin lesions noted.  Psych -  Affect appropriate.  Thought process is normal without evidence of depression or psychosis.    Good insight and appropriae interaction.  Cognition and memory appear to be intact.        ASSESSMENT & PLAN  Dyana was seen today for congestion.    Diagnoses and all orders for this visit:    Congestion of both ears  -

## 2024-03-14 ENCOUNTER — TELEPHONE (OUTPATIENT)
Dept: FAMILY MEDICINE CLINIC | Age: 79
End: 2024-03-14

## 2024-03-14 NOTE — TELEPHONE ENCOUNTER
Okay to take Prednisone, I wouldn't have put her on it if it wasn't safe to take together.     Can only take either Zyrtec or Claritin- NOT BOTH

## 2024-03-14 NOTE — TELEPHONE ENCOUNTER
----- Message from TESS Jang - CNP sent at 3/12/2024 12:54 PM EDT -----  Please check on cold symptoms with steroid

## 2024-03-14 NOTE — TELEPHONE ENCOUNTER
Pt states \" seems to be better. I was wondering with the medicine I am on, can I take the steroid and the Claritin, as well as zyrtec ? I am starting to itch because I am not taking the zyrtec\"

## 2024-03-15 DIAGNOSIS — E78.5 HYPERLIPIDEMIA, UNSPECIFIED HYPERLIPIDEMIA TYPE: ICD-10-CM

## 2024-03-15 RX ORDER — PRAVASTATIN SODIUM 40 MG
40 TABLET ORAL DAILY
Qty: 90 TABLET | Refills: 3 | Status: SHIPPED | OUTPATIENT
Start: 2024-03-15

## 2024-03-15 NOTE — TELEPHONE ENCOUNTER
Recent Visits  Date Type Provider Dept   03/12/24 Office Visit Sayra Cottrell, APRN - CNP [facility]   02/20/24 Office Visit Sayra Cottrell, APRN - CNP [facility]   12/07/23 Office Visit Sayra Cottrell, APRN - CNP [facility]   08/22/23 Office Visit Sayra Cottrell, APRN - CNP [facility]   05/02/23 Office Visit Sayra Cottrell, APRN - CNP [facility]   04/03/23 Office Visit Sayra Cottrell, APRN - CNP [facility]   02/21/23 Office Visit Sayra Cottrell, APRN - CNP [facility]   01/10/23 Office Visit Sayra Cottrell, APRN - CNP [facility]   Showing recent visits within past 540 days with a meds authorizing provider and meeting all other requirements  Future Appointments  Date Type Provider Dept   04/23/24 Appointment Sayra Cottrell APRN - CNP [facility]   Showing future appointments within next 150 days with a meds authorizing provider and meeting all other requirements

## 2024-03-15 NOTE — TELEPHONE ENCOUNTER
Sounded like from phone call yesterday she hadn't started the steroid because she didn't know if it was safe to take with her other meds.  So if she has only had 24 hours of the meds, prob not long enough to see much improvement.  Would increase water intake.  Add some warm beverages to help sooth sore throat.    Can take Tylenol if not already.  Is she taking Flonase daily?  If not start that.

## 2024-04-23 ENCOUNTER — OFFICE VISIT (OUTPATIENT)
Dept: FAMILY MEDICINE CLINIC | Age: 79
End: 2024-04-23
Payer: MEDICARE

## 2024-04-23 VITALS
HEART RATE: 70 BPM | WEIGHT: 236 LBS | BODY MASS INDEX: 44.56 KG/M2 | DIASTOLIC BLOOD PRESSURE: 82 MMHG | HEIGHT: 61 IN | TEMPERATURE: 97.7 F | SYSTOLIC BLOOD PRESSURE: 124 MMHG | RESPIRATION RATE: 12 BRPM | OXYGEN SATURATION: 94 %

## 2024-04-23 DIAGNOSIS — L29.9 ITCHING: ICD-10-CM

## 2024-04-23 DIAGNOSIS — E11.69 TYPE 2 DIABETES MELLITUS WITH OTHER SPECIFIED COMPLICATION, WITHOUT LONG-TERM CURRENT USE OF INSULIN (HCC): Primary | ICD-10-CM

## 2024-04-23 LAB — HBA1C MFR BLD: 7 % (ref 4.3–5.7)

## 2024-04-23 PROCEDURE — 3074F SYST BP LT 130 MM HG: CPT | Performed by: NURSE PRACTITIONER

## 2024-04-23 PROCEDURE — G8427 DOCREV CUR MEDS BY ELIG CLIN: HCPCS | Performed by: NURSE PRACTITIONER

## 2024-04-23 PROCEDURE — 1123F ACP DISCUSS/DSCN MKR DOCD: CPT | Performed by: NURSE PRACTITIONER

## 2024-04-23 PROCEDURE — 1090F PRES/ABSN URINE INCON ASSESS: CPT | Performed by: NURSE PRACTITIONER

## 2024-04-23 PROCEDURE — 3079F DIAST BP 80-89 MM HG: CPT | Performed by: NURSE PRACTITIONER

## 2024-04-23 PROCEDURE — 1036F TOBACCO NON-USER: CPT | Performed by: NURSE PRACTITIONER

## 2024-04-23 PROCEDURE — G8417 CALC BMI ABV UP PARAM F/U: HCPCS | Performed by: NURSE PRACTITIONER

## 2024-04-23 PROCEDURE — G8399 PT W/DXA RESULTS DOCUMENT: HCPCS | Performed by: NURSE PRACTITIONER

## 2024-04-23 PROCEDURE — 99214 OFFICE O/P EST MOD 30 MIN: CPT | Performed by: NURSE PRACTITIONER

## 2024-04-23 PROCEDURE — 3051F HG A1C>EQUAL 7.0%<8.0%: CPT | Performed by: NURSE PRACTITIONER

## 2024-04-23 NOTE — PATIENT INSTRUCTIONS
Start WRITTEN food diary  Aim to keep daily carb intake under 100 grams total  Do not count carbs in vegetables unless it is a white potato, sweet potato, or corn  Try to stick to lower carb fruits  Do count carbs in all fruit  Aim to get in 100 grams of protein in per day  Drink 2-3 liters of water per day  Avoid sugary or sweetened beverages  Walk 30-60 min 3-5 times a week

## 2024-04-23 NOTE — PROGRESS NOTES
Dyana Mancera is a 78 y.o. female for Follow-up (Diabetes)      Diabetes Type 2    Glucose control:   Does patient check blood glucoses at home?  No  Report of hypoglycemia: no  Lab Results   Component Value Date    LABA1C 7.0 (H) 04/23/2024     Lab Results   Component Value Date     (H) 06/09/2021       Symptoms  Polyuria, Polydipsia or Polyphagia?   No  Chest Pain, SOB, or Palpitations? -  Yes - chest pain   New Vision complaints? Worsening vision over time  Paresthesias of the extremities?  Yes - hands fall asleep at night sometimes    Medications  Current medication were reviewed.  Compliant with medications?  Didn't start the Jardiance until about 12 days ago  Medication side effects?  No  On ACE-I or ARB?  Yes  On antiplatelet therapy?  No  On Statin?  Yes    Last Diabetic Eye Exam: UTD    Exercise  Exercise? No  Wt Readings from Last 3 Encounters:   04/23/24 107 kg (236 lb)   03/12/24 106.3 kg (234 lb 6.4 oz)   02/20/24 106.6 kg (235 lb)       Diet discipline?:  Low salt, fat, sugar diet?  no    Blood pressure control:  BP Readings from Last 3 Encounters:   04/23/24 124/82   03/12/24 124/82   02/20/24 124/78     Itching is still bad   Not taking otc Zyrtec daily  Notices it gets worse being outside       No results found for: \"[id number]\"    Lab Results   Component Value Date    LDLCALC 122 01/09/2024    LDLDIRECT 142 (H) 04/06/2018       Physical Exam    /82   Pulse 70   Temp 97.7 °F (36.5 °C) (Oral)   Resp 12   Ht 1.549 m (5' 1\")   Wt 107 kg (236 lb)   SpO2 94%   BMI 44.59 kg/m²   Appearance: alert, well appearing, and in no distress, normal appearing weight and well hydrated.  Neck exam - supple, no significant adenopathy.  CVS exam: normal rate, regular rhythm, normal S1, S2, no murmurs, rubs, clicks or gallops.  Lungs: clear to auscultation, no wheezes, rales or rhonchi, symmetric air entry.  Abdomen:  BS normal, soft, non tender, non distended, no rebound or guarding  Mental

## 2024-05-20 DIAGNOSIS — J32.9 RHINOSINUSITIS: ICD-10-CM

## 2024-05-20 DIAGNOSIS — H66.90 ACUTE OTITIS MEDIA, UNSPECIFIED OTITIS MEDIA TYPE: ICD-10-CM

## 2024-05-20 RX ORDER — CETIRIZINE HYDROCHLORIDE 10 MG/1
10 TABLET ORAL DAILY
Qty: 90 TABLET | Refills: 3 | Status: SHIPPED | OUTPATIENT
Start: 2024-05-20

## 2024-06-13 DIAGNOSIS — I10 HTN, GOAL BELOW 140/90: ICD-10-CM

## 2024-06-13 RX ORDER — METOPROLOL SUCCINATE 25 MG/1
25 TABLET, EXTENDED RELEASE ORAL DAILY
Qty: 90 TABLET | Refills: 3 | Status: SHIPPED | OUTPATIENT
Start: 2024-06-13

## 2024-06-13 NOTE — TELEPHONE ENCOUNTER
Future Appointments   Date Time Provider Department Center   6/20/2024 11:00 AM Sayra Cottrell APRN - CNP [facility]   10/23/2024 11:00 AM Sayra Cottrell APRN - CNP [facility] [place]

## 2024-06-20 ENCOUNTER — OFFICE VISIT (OUTPATIENT)
Dept: FAMILY MEDICINE CLINIC | Age: 79
End: 2024-06-20
Payer: MEDICARE

## 2024-06-20 VITALS
HEIGHT: 61 IN | BODY MASS INDEX: 43.65 KG/M2 | OXYGEN SATURATION: 94 % | SYSTOLIC BLOOD PRESSURE: 124 MMHG | TEMPERATURE: 98.5 F | HEART RATE: 61 BPM | DIASTOLIC BLOOD PRESSURE: 78 MMHG | WEIGHT: 231.2 LBS | RESPIRATION RATE: 14 BRPM

## 2024-06-20 DIAGNOSIS — K21.9 GASTROESOPHAGEAL REFLUX DISEASE WITHOUT ESOPHAGITIS: ICD-10-CM

## 2024-06-20 DIAGNOSIS — Z00.00 MEDICARE ANNUAL WELLNESS VISIT, SUBSEQUENT: Primary | ICD-10-CM

## 2024-06-20 DIAGNOSIS — I10 HTN, GOAL BELOW 140/90: ICD-10-CM

## 2024-06-20 DIAGNOSIS — R39.15 URGENCY OF URINATION: ICD-10-CM

## 2024-06-20 PROCEDURE — 1123F ACP DISCUSS/DSCN MKR DOCD: CPT | Performed by: NURSE PRACTITIONER

## 2024-06-20 PROCEDURE — 3078F DIAST BP <80 MM HG: CPT | Performed by: NURSE PRACTITIONER

## 2024-06-20 PROCEDURE — G0439 PPPS, SUBSEQ VISIT: HCPCS | Performed by: NURSE PRACTITIONER

## 2024-06-20 PROCEDURE — 3074F SYST BP LT 130 MM HG: CPT | Performed by: NURSE PRACTITIONER

## 2024-06-20 RX ORDER — OMEPRAZOLE 20 MG/1
20 CAPSULE, DELAYED RELEASE ORAL DAILY
Qty: 90 CAPSULE | Refills: 3 | Status: SHIPPED | OUTPATIENT
Start: 2024-06-20

## 2024-06-20 RX ORDER — SOLIFENACIN SUCCINATE 5 MG/1
10 TABLET, FILM COATED ORAL DAILY
Qty: 180 TABLET | Refills: 3 | Status: SHIPPED | OUTPATIENT
Start: 2024-06-20

## 2024-06-20 RX ORDER — LOSARTAN POTASSIUM 100 MG/1
100 TABLET ORAL DAILY
Qty: 90 TABLET | Refills: 3 | Status: SHIPPED | OUTPATIENT
Start: 2024-06-20

## 2024-06-20 ASSESSMENT — PATIENT HEALTH QUESTIONNAIRE - PHQ9
SUM OF ALL RESPONSES TO PHQ9 QUESTIONS 1 & 2: 0
SUM OF ALL RESPONSES TO PHQ QUESTIONS 1-9: 0
SUM OF ALL RESPONSES TO PHQ QUESTIONS 1-9: 0
1. LITTLE INTEREST OR PLEASURE IN DOING THINGS: NOT AT ALL
2. FEELING DOWN, DEPRESSED OR HOPELESS: NOT AT ALL
SUM OF ALL RESPONSES TO PHQ QUESTIONS 1-9: 0
SUM OF ALL RESPONSES TO PHQ QUESTIONS 1-9: 0

## 2024-06-20 ASSESSMENT — LIFESTYLE VARIABLES
HOW OFTEN DO YOU HAVE A DRINK CONTAINING ALCOHOL: NEVER
HOW MANY STANDARD DRINKS CONTAINING ALCOHOL DO YOU HAVE ON A TYPICAL DAY: PATIENT DOES NOT DRINK

## 2024-06-20 NOTE — PATIENT INSTRUCTIONS
Eating Healthy Foods: Care Instructions  With every meal, you can make healthy food choices. Try to eat a variety of fruits, vegetables, whole grains, lean proteins, and low-fat dairy products. This can help you get the right balance of nutrients, including vitamins and minerals. Small changes add up over time. You can start by adding one healthy food to your meals each day.    Try to make half your plate fruits and vegetables, one-fourth whole grains, and one-fourth lean proteins. Try including dairy with your meals.   Eat more fruits and vegetables. Try to have them with most meals and snacks.   Foods for healthy eating        Fruits   These can be fresh, frozen, canned, or dried.  Try to choose whole fruit rather than fruit juice.  Eat a variety of colors.        Vegetables   These can be fresh, frozen, canned, or dried.  Beans, peas, and lentils count too.        Whole grains   Choose whole-grain breads, cereals, and noodles.  Try brown rice.        Lean proteins   These can include lean meat, poultry, fish, and eggs.  You can also have tofu, beans, peas, lentils, nuts, and seeds.        Dairy   Try milk, yogurt, and cheese.  Choose low-fat or fat-free when you can.  If you need to, use lactose-free milk or fortified plant-based milk products, such as soy milk.        Water   Drink water when you're thirsty.  Limit sugar-sweetened drinks, including soda, fruit drinks, and sports drinks.  Where can you learn more?  Go to https://www.[employer].net/patientEd and enter T756 to learn more about \"Eating Healthy Foods: Care Instructions.\"  Current as of: September 20, 2023  Content Version: 14.1  © 1492-6604 [employer].   Care instructions adapted under license by [employer]. If you have questions about a medical condition or this instruction, always ask your healthcare professional. [employer] disclaims any warranty or liability for your use of this information.           Starting a

## 2024-06-20 NOTE — PROGRESS NOTES
Medicare Annual Wellness Visit    Dyana Mancera is here for Medicare AWV    Assessment & Plan   Medicare annual wellness visit, subsequent  HTN, goal below 140/90  -     losartan (COZAAR) 100 MG tablet; Take 1 tablet by mouth daily, Disp-90 tablet, R-3Normal  Gastroesophageal reflux disease without esophagitis  -     omeprazole (PRILOSEC) 20 MG delayed release capsule; Take 1 capsule by mouth daily, Disp-90 capsule, R-3Normal  Urgency of urination  -     solifenacin (VESICARE) 5 MG tablet; Take 2 tablets by mouth daily, Disp-180 tablet, R-3Normal      Recommendations for Preventive Services Due: see orders and patient instructions/AVS.  Recommended screening schedule for the next 5-10 years is provided to the patient in written form: see Patient Instructions/AVS.     No follow-ups on file.     Subjective   The following acute and/or chronic problems were also addressed today:  Obesity, DM    Patient's complete Health Risk Assessment and screening values have been reviewed and are found in Flowsheets. The following problems were reviewed today and where indicated follow up appointments were made and/or referrals ordered.    Positive Risk Factor Screenings with Interventions:                Activity, Diet, and Weight:  On average, how many days per week do you engage in moderate to strenuous exercise (like a brisk walk)?: 2 days  On average, how many minutes do you engage in exercise at this level?: 20 min    Do you eat balanced/healthy meals regularly?: (!) No    Body mass index is 43.68 kg/m². (!) Abnormal    Do you eat balanced/healthy meals regularly Interventions:  See AVS for additional education material  Obesity Interventions:  See AVS for additional education material, trying to follow lower carb diet                               Objective   Vitals:    06/20/24 1054   BP: 124/78   Pulse: 61   Resp: 14   Temp: 98.5 °F (36.9 °C)   TempSrc: Oral   SpO2: 94%   Weight: 104.9 kg (231 lb 3.2 oz)   Height:

## 2024-10-23 ENCOUNTER — OFFICE VISIT (OUTPATIENT)
Dept: FAMILY MEDICINE CLINIC | Age: 79
End: 2024-10-23

## 2024-10-23 VITALS
TEMPERATURE: 97.6 F | RESPIRATION RATE: 16 BRPM | BODY MASS INDEX: 44.63 KG/M2 | SYSTOLIC BLOOD PRESSURE: 124 MMHG | WEIGHT: 236.4 LBS | DIASTOLIC BLOOD PRESSURE: 80 MMHG | HEART RATE: 73 BPM | HEIGHT: 61 IN | OXYGEN SATURATION: 95 %

## 2024-10-23 DIAGNOSIS — Z85.3 HX: BREAST CANCER: ICD-10-CM

## 2024-10-23 DIAGNOSIS — Z12.31 ENCOUNTER FOR SCREENING MAMMOGRAM FOR MALIGNANT NEOPLASM OF BREAST: ICD-10-CM

## 2024-10-23 DIAGNOSIS — E11.69 TYPE 2 DIABETES MELLITUS WITH OTHER SPECIFIED COMPLICATION, WITHOUT LONG-TERM CURRENT USE OF INSULIN (HCC): Primary | ICD-10-CM

## 2024-10-23 DIAGNOSIS — I10 HTN, GOAL BELOW 140/90: ICD-10-CM

## 2024-10-23 DIAGNOSIS — L30.9 DERMATITIS: ICD-10-CM

## 2024-10-23 LAB — HBA1C MFR BLD: 6.5 % (ref 4.3–5.7)

## 2024-10-23 RX ORDER — DESONIDE 0.5 MG/G
CREAM TOPICAL
Qty: 60 G | Refills: 1 | Status: SHIPPED | OUTPATIENT
Start: 2024-10-23

## 2024-10-23 RX ORDER — AMLODIPINE BESYLATE 10 MG/1
10 TABLET ORAL DAILY
Qty: 90 TABLET | Refills: 3 | Status: SHIPPED | OUTPATIENT
Start: 2024-10-23

## 2024-10-23 RX ORDER — GLUCOSAMINE HCL/CHONDROITIN SU 500-400 MG
CAPSULE ORAL
Qty: 90 STRIP | Refills: 3 | Status: SHIPPED | OUTPATIENT
Start: 2024-10-23

## 2024-10-23 NOTE — PROGRESS NOTES
Dyana Mancera is a 79 y.o. female for Follow-up and Diabetes      Diabetes Type 2    Glucose control:   Does patient check blood glucoses at home?  Yes - occasionally  Report of hypoglycemia: no  Lab Results   Component Value Date    LABA1C 6.5 (H) 10/23/2024     Lab Results   Component Value Date     (H) 06/09/2021       Symptoms  Polyuria, Polydipsia or Polyphagia?   No  Chest Pain, SOB, or Palpitations? -  No  New Vision complaints? No  Paresthesias of the extremities?  No    Medications  Current medication were reviewed.  Compliant with medications?  yes  Medication side effects?  No  On ACE-I or ARB?  Yes  On antiplatelet therapy?  Yes  On Statin?  Yes    Last Diabetic Eye Exam: utd    Exercise  Exercise? No  Wt Readings from Last 3 Encounters:   10/23/24 107.2 kg (236 lb 6.4 oz)   06/20/24 104.9 kg (231 lb 3.2 oz)   04/23/24 107 kg (236 lb)       Diet discipline?:  Low salt, fat, sugar diet?  yes    Blood pressure control:  BP Readings from Last 3 Encounters:   10/23/24 124/80   06/20/24 124/78   04/23/24 124/82       No results found for: \"[id number]\"    Lab Results   Component Value Date    LDLDIRECT 142 (H) 04/06/2018       Physical Exam    /80   Pulse 73   Temp 97.6 °F (36.4 °C) (Oral)   Resp 16   Ht 1.549 m (5' 1\")   Wt 107.2 kg (236 lb 6.4 oz)   SpO2 95%   BMI 44.67 kg/m²   Appearance: alert, well appearing, and in no distress, normal appearing weight and well hydrated.  Neck exam - supple, no significant adenopathy.  CVS exam: normal rate, regular rhythm, normal S1, S2, no murmurs, rubs, clicks or gallops.  Lungs: clear to auscultation, no wheezes, rales or rhonchi, symmetric air entry.  Abdomen:  BS normal, soft, non tender, non distended, no rebound or guarding  Mental Status: normal mood, affect behavior, speech, dress,  and thought processes.  Neuro:  Alert, muscle tone grossly normal  Skin exam: normal coloration and turgor, no suspicious skin lesions noted +erythematous

## 2024-10-28 ENCOUNTER — LAB (OUTPATIENT)
Dept: LAB | Age: 79
End: 2024-10-28

## 2024-10-28 LAB
ALBUMIN SERPL BCG-MCNC: 4.4 G/DL (ref 3.5–5.1)
ALP SERPL-CCNC: 106 U/L (ref 38–126)
ALT SERPL W/O P-5'-P-CCNC: 35 U/L (ref 11–66)
AST SERPL-CCNC: 73 U/L (ref 5–40)
BILIRUB CONJ SERPL-MCNC: < 0.1 MG/DL (ref 0.1–13.8)
BILIRUB SERPL-MCNC: 0.6 MG/DL (ref 0.3–1.2)
PROT SERPL-MCNC: 7.7 G/DL (ref 6.1–8)

## 2024-10-30 ENCOUNTER — TELEPHONE (OUTPATIENT)
Dept: FAMILY MEDICINE CLINIC | Age: 79
End: 2024-10-30

## 2024-10-30 NOTE — TELEPHONE ENCOUNTER
[facility] called and is questing the brand of blood glucose strips the pt uses. Please use reference number when calling back after speaking with pt.     Ref # [id number]      Called and spoke with pt. She uses Accu check- Guide. Red box.      Called [facility] and confirmed the brand of strips with them.

## 2024-10-31 ENCOUNTER — HOSPITAL ENCOUNTER (OUTPATIENT)
Dept: WOMENS IMAGING | Age: 79
Discharge: HOME | End: 2024-10-31
Payer: MEDICARE

## 2024-10-31 VITALS — BODY MASS INDEX: 44.62 KG/M2 | HEIGHT: 61 IN | WEIGHT: 236.33 LBS

## 2024-10-31 DIAGNOSIS — Z12.31 ENCOUNTER FOR SCREENING MAMMOGRAM FOR MALIGNANT NEOPLASM OF BREAST: ICD-10-CM

## 2024-10-31 DIAGNOSIS — Z85.3 HX: BREAST CANCER: ICD-10-CM

## 2024-10-31 PROCEDURE — 77063 BREAST TOMOSYNTHESIS BI: CPT

## 2024-10-31 PROCEDURE — G0279 TOMOSYNTHESIS, MAMMO: HCPCS

## 2024-11-01 ENCOUNTER — TELEPHONE (OUTPATIENT)
Dept: FAMILY MEDICINE CLINIC | Age: 79
End: 2024-11-01

## 2024-11-04 NOTE — TELEPHONE ENCOUNTER
Pt informed of normal mammogram . Pt voiced understanding with no further questions at this time.

## 2024-11-04 NOTE — TELEPHONE ENCOUNTER
Left message on answering machine. Requested pt to call back at [phone], at their earliest convenience.

## 2025-02-12 DIAGNOSIS — E11.69 TYPE 2 DIABETES MELLITUS WITH OTHER SPECIFIED COMPLICATION, WITHOUT LONG-TERM CURRENT USE OF INSULIN (HCC): ICD-10-CM

## 2025-02-12 RX ORDER — EMPAGLIFLOZIN 10 MG/1
10 TABLET, FILM COATED ORAL DAILY
Qty: 90 TABLET | Refills: 3 | Status: SHIPPED | OUTPATIENT
Start: 2025-02-12

## 2025-02-12 NOTE — TELEPHONE ENCOUNTER
Recent Visits  Date Type Provider Dept   10/23/24 Office Visit Sayra Cottrell, APRN - CNP [facility]   06/20/24 Office Visit Sayra Cottrell, APRN - CNP [facility]   04/23/24 Office Visit Sayra Cottrell, APRN - CNP [facility]   03/12/24 Office Visit Sayra Cottrell, APRN - CNP [facility]   02/20/24 Office Visit Sayra Cottrell, APRN - CNP [facility]   12/07/23 Office Visit Sayra Cottrell, APRN - CNP [facility]   08/22/23 Office Visit Sayra Cottrell, APRN - CNP [facility]   Showing recent visits within past 540 days with a meds authorizing provider and meeting all other requirements  Future Appointments  Date Type Provider Dept   04/24/25 Appointment Sayra Cottrell APRN - CNP [facility]   Showing future appointments within next 150 days with a meds authorizing provider and meeting all other requirements

## 2025-03-05 ENCOUNTER — OFFICE VISIT (OUTPATIENT)
Dept: FAMILY MEDICINE CLINIC | Age: 80
End: 2025-03-05
Payer: MEDICARE

## 2025-03-05 VITALS
HEART RATE: 98 BPM | OXYGEN SATURATION: 94 % | HEIGHT: 61 IN | TEMPERATURE: 98.4 F | BODY MASS INDEX: 45.5 KG/M2 | SYSTOLIC BLOOD PRESSURE: 122 MMHG | WEIGHT: 241 LBS | RESPIRATION RATE: 12 BRPM | DIASTOLIC BLOOD PRESSURE: 82 MMHG

## 2025-03-05 DIAGNOSIS — R09.81 SINUS CONGESTION: ICD-10-CM

## 2025-03-05 DIAGNOSIS — E66.813 CLASS 3 SEVERE OBESITY WITH SERIOUS COMORBIDITY AND BODY MASS INDEX (BMI) OF 45.0 TO 49.9 IN ADULT, UNSPECIFIED OBESITY TYPE: ICD-10-CM

## 2025-03-05 DIAGNOSIS — I10 HTN, GOAL BELOW 140/90: ICD-10-CM

## 2025-03-05 DIAGNOSIS — J34.89 SINUS PRESSURE: ICD-10-CM

## 2025-03-05 DIAGNOSIS — E66.01 CLASS 3 SEVERE OBESITY WITH SERIOUS COMORBIDITY AND BODY MASS INDEX (BMI) OF 45.0 TO 49.9 IN ADULT, UNSPECIFIED OBESITY TYPE: ICD-10-CM

## 2025-03-05 DIAGNOSIS — E11.69 TYPE 2 DIABETES MELLITUS WITH OTHER SPECIFIED COMPLICATION, WITHOUT LONG-TERM CURRENT USE OF INSULIN (HCC): ICD-10-CM

## 2025-03-05 DIAGNOSIS — R51.9 CHRONIC NONINTRACTABLE HEADACHE, UNSPECIFIED HEADACHE TYPE: Primary | ICD-10-CM

## 2025-03-05 DIAGNOSIS — Z85.3 HX: BREAST CANCER: ICD-10-CM

## 2025-03-05 DIAGNOSIS — E55.9 VITAMIN D DEFICIENCY: ICD-10-CM

## 2025-03-05 DIAGNOSIS — G89.29 CHRONIC NONINTRACTABLE HEADACHE, UNSPECIFIED HEADACHE TYPE: Primary | ICD-10-CM

## 2025-03-05 DIAGNOSIS — Z86.79 H/O CLASS II ANGINA PECTORIS: ICD-10-CM

## 2025-03-05 PROCEDURE — 1036F TOBACCO NON-USER: CPT | Performed by: NURSE PRACTITIONER

## 2025-03-05 PROCEDURE — 3079F DIAST BP 80-89 MM HG: CPT | Performed by: NURSE PRACTITIONER

## 2025-03-05 PROCEDURE — 99214 OFFICE O/P EST MOD 30 MIN: CPT | Performed by: NURSE PRACTITIONER

## 2025-03-05 PROCEDURE — G8399 PT W/DXA RESULTS DOCUMENT: HCPCS | Performed by: NURSE PRACTITIONER

## 2025-03-05 PROCEDURE — 1090F PRES/ABSN URINE INCON ASSESS: CPT | Performed by: NURSE PRACTITIONER

## 2025-03-05 PROCEDURE — 3074F SYST BP LT 130 MM HG: CPT | Performed by: NURSE PRACTITIONER

## 2025-03-05 PROCEDURE — G8427 DOCREV CUR MEDS BY ELIG CLIN: HCPCS | Performed by: NURSE PRACTITIONER

## 2025-03-05 PROCEDURE — 1123F ACP DISCUSS/DSCN MKR DOCD: CPT | Performed by: NURSE PRACTITIONER

## 2025-03-05 PROCEDURE — G8417 CALC BMI ABV UP PARAM F/U: HCPCS | Performed by: NURSE PRACTITIONER

## 2025-03-05 PROCEDURE — 1159F MED LIST DOCD IN RCRD: CPT | Performed by: NURSE PRACTITIONER

## 2025-03-05 RX ORDER — PREDNISONE 20 MG/1
40 TABLET ORAL DAILY
Qty: 10 TABLET | Refills: 0 | Status: SHIPPED | OUTPATIENT
Start: 2025-03-05 | End: 2025-03-10

## 2025-03-05 SDOH — ECONOMIC STABILITY: FOOD INSECURITY: WITHIN THE PAST 12 MONTHS, YOU WORRIED THAT YOUR FOOD WOULD RUN OUT BEFORE YOU GOT MONEY TO BUY MORE.: NEVER TRUE

## 2025-03-05 SDOH — ECONOMIC STABILITY: FOOD INSECURITY: WITHIN THE PAST 12 MONTHS, THE FOOD YOU BOUGHT JUST DIDN'T LAST AND YOU DIDN'T HAVE MONEY TO GET MORE.: NEVER TRUE

## 2025-03-05 ASSESSMENT — PATIENT HEALTH QUESTIONNAIRE - PHQ9
SUM OF ALL RESPONSES TO PHQ QUESTIONS 1-9: 0
2. FEELING DOWN, DEPRESSED OR HOPELESS: NOT AT ALL
1. LITTLE INTEREST OR PLEASURE IN DOING THINGS: NOT AT ALL
SUM OF ALL RESPONSES TO PHQ QUESTIONS 1-9: 0

## 2025-03-05 NOTE — PROGRESS NOTES
Dyana Mancera is a 79 y.o. female thatpresents for Headache (Fluttering in left ear/Painful near eye/Been going on for awhile/Not getting any better)      History obtained today from Patient and .    History of Present Illness  The patient presents for evaluation of headaches, memory issues, swallowing difficulties, sinus congestion, and left-sided jaw pain.    She reports experiencing headaches, which are not severe but are exacerbated by certain head positions or when lying down. The pain is described as migratory, affecting both sides of the head and the back. She has no history of chronic headaches or migraines. Accompanying symptoms include light sensitivity and pain around the left eye, although the latter can occur bilaterally. She reports no associated nausea, vomiting, numbness, or tingling. She also reports no fever, dizziness, chest pain, shortness of breath, or syncope during these episodes. She experiences blurry vision, particularly in the mornings, and occasional double vision. She has been using Tylenol for headache relief, which provides some benefit. She maintains good hydration, consuming approximately half a gallon of water daily. She avoids caffeine and does not consume coffee, but admits to occasional chocolate consumption. She does not monitor her blood pressure during headache episodes. She has been experiencing memory issues and has been self-medicating with over-the-counter pills. She does not follow up with a neurologist for this issue.    She has been experiencing difficulty swallowing, with pills often getting lodged in her throat. She continues to experience this issue. She had a consultation with a gastroenterologist's nurse practitioner in 10/2024, during which she underwent a colonoscopy and liver ultrasound. She is uncertain about the results of these tests and does not currently have a follow-up appointment scheduled.    She reports occasional numbness in her hands,

## 2025-03-10 DIAGNOSIS — E78.5 HYPERLIPIDEMIA, UNSPECIFIED HYPERLIPIDEMIA TYPE: ICD-10-CM

## 2025-03-10 RX ORDER — PRAVASTATIN SODIUM 40 MG
40 TABLET ORAL DAILY
Qty: 90 TABLET | Refills: 3 | Status: SHIPPED | OUTPATIENT
Start: 2025-03-10

## 2025-03-10 NOTE — TELEPHONE ENCOUNTER
Future Appointments   Date Time Provider Department Center   3/13/2025 11:45 AM [facility] [place]   4/24/2025 11:00 AM Sayra Cottrell APRN - CNP [facility]

## 2025-03-13 ENCOUNTER — HOSPITAL ENCOUNTER (OUTPATIENT)
Dept: CT IMAGING | Age: 80
Discharge: HOME | End: 2025-03-13
Payer: MEDICARE

## 2025-03-13 DIAGNOSIS — R51.9 CHRONIC NONINTRACTABLE HEADACHE, UNSPECIFIED HEADACHE TYPE: ICD-10-CM

## 2025-03-13 DIAGNOSIS — G89.29 CHRONIC NONINTRACTABLE HEADACHE, UNSPECIFIED HEADACHE TYPE: ICD-10-CM

## 2025-03-13 LAB
CREAT BLD-MCNC: 0.6 MG/DL (ref 0.5–1.2)
GFR SERPL CREATININE-BSD FRML MDRD: > 90 ML/MIN/1.73M2

## 2025-03-13 PROCEDURE — 6360000004 HC RX CONTRAST MEDICATION: Performed by: NURSE PRACTITIONER

## 2025-03-13 PROCEDURE — 70470 CT HEAD/BRAIN W/O & W/DYE: CPT

## 2025-03-13 PROCEDURE — 82565 ASSAY OF CREATININE: CPT

## 2025-03-13 RX ORDER — IOPAMIDOL 755 MG/ML
65 INJECTION, SOLUTION INTRAVASCULAR
Status: COMPLETED | OUTPATIENT
Start: 2025-03-13 | End: 2025-03-13

## 2025-03-13 RX ADMIN — IOPAMIDOL 65 ML: 755 INJECTION, SOLUTION INTRAVENOUS at 11:59

## 2025-03-14 ENCOUNTER — RESULTS FOLLOW-UP (OUTPATIENT)
Dept: CT IMAGING | Age: 80
End: 2025-03-14

## 2025-04-04 ENCOUNTER — RESULTS FOLLOW-UP (OUTPATIENT)
Dept: FAMILY MEDICINE CLINIC | Age: 80
End: 2025-04-04

## 2025-04-04 ENCOUNTER — LAB (OUTPATIENT)
Dept: LAB | Age: 80
End: 2025-04-04

## 2025-04-04 DIAGNOSIS — Z85.3 HX: BREAST CANCER: ICD-10-CM

## 2025-04-04 DIAGNOSIS — Z86.79 H/O CLASS II ANGINA PECTORIS: ICD-10-CM

## 2025-04-04 DIAGNOSIS — R51.9 CHRONIC NONINTRACTABLE HEADACHE, UNSPECIFIED HEADACHE TYPE: ICD-10-CM

## 2025-04-04 DIAGNOSIS — E66.01 CLASS 3 SEVERE OBESITY WITH SERIOUS COMORBIDITY AND BODY MASS INDEX (BMI) OF 45.0 TO 49.9 IN ADULT, UNSPECIFIED OBESITY TYPE: ICD-10-CM

## 2025-04-04 DIAGNOSIS — R09.81 SINUS CONGESTION: ICD-10-CM

## 2025-04-04 DIAGNOSIS — G89.29 CHRONIC NONINTRACTABLE HEADACHE, UNSPECIFIED HEADACHE TYPE: ICD-10-CM

## 2025-04-04 DIAGNOSIS — I10 HTN, GOAL BELOW 140/90: ICD-10-CM

## 2025-04-04 DIAGNOSIS — J34.89 SINUS PRESSURE: ICD-10-CM

## 2025-04-04 DIAGNOSIS — E11.69 TYPE 2 DIABETES MELLITUS WITH OTHER SPECIFIED COMPLICATION, WITHOUT LONG-TERM CURRENT USE OF INSULIN: ICD-10-CM

## 2025-04-04 DIAGNOSIS — E66.813 CLASS 3 SEVERE OBESITY WITH SERIOUS COMORBIDITY AND BODY MASS INDEX (BMI) OF 45.0 TO 49.9 IN ADULT, UNSPECIFIED OBESITY TYPE: ICD-10-CM

## 2025-04-04 DIAGNOSIS — E55.9 VITAMIN D DEFICIENCY: ICD-10-CM

## 2025-04-04 LAB
25(OH)D3 SERPL-MCNC: 44 NG/ML (ref 30–100)
ALBUMIN SERPL BCG-MCNC: 4.3 G/DL (ref 3.4–4.9)
ALP SERPL-CCNC: 118 U/L (ref 38–126)
ALT SERPL W/O P-5'-P-CCNC: 39 U/L (ref 10–35)
ANION GAP SERPL CALC-SCNC: 13 MEQ/L (ref 8–16)
AST SERPL-CCNC: 47 U/L (ref 10–35)
BASOPHILS ABSOLUTE: 0.1 THOU/MM3 (ref 0–0.1)
BASOPHILS NFR BLD AUTO: 0.9 %
BILIRUB SERPL-MCNC: 0.5 MG/DL (ref 0.3–1.2)
BUN SERPL-MCNC: 15 MG/DL (ref 8–23)
CALCIUM SERPL-MCNC: 9.9 MG/DL (ref 8.8–10.2)
CHLORIDE SERPL-SCNC: 103 MEQ/L (ref 98–111)
CHOLEST SERPL-MCNC: 221 MG/DL (ref 100–199)
CO2 SERPL-SCNC: 23 MEQ/L (ref 22–29)
CREAT SERPL-MCNC: 0.7 MG/DL (ref 0.5–0.9)
DEPRECATED RDW RBC AUTO: 49.3 FL (ref 35–45)
EOSINOPHIL NFR BLD AUTO: 2.6 %
EOSINOPHILS ABSOLUTE: 0.1 THOU/MM3 (ref 0–0.4)
ERYTHROCYTE [DISTWIDTH] IN BLOOD BY AUTOMATED COUNT: 15.2 % (ref 11.5–14.5)
GFR SERPL CREATININE-BSD FRML MDRD: 89 ML/MIN/1.73M2
GLUCOSE SERPL-MCNC: 137 MG/DL (ref 74–109)
HCT VFR BLD AUTO: 47.7 % (ref 37–47)
HDLC SERPL-MCNC: 70 MG/DL
HGB BLD-MCNC: 14.9 GM/DL (ref 12–16)
IMM GRANULOCYTES # BLD AUTO: 0.02 THOU/MM3 (ref 0–0.07)
IMM GRANULOCYTES NFR BLD AUTO: 0.4 %
LDLC SERPL CALC-MCNC: 131 MG/DL
LYMPHOCYTES ABSOLUTE: 1.8 THOU/MM3 (ref 1–4.8)
LYMPHOCYTES NFR BLD AUTO: 31.6 %
MCH RBC QN AUTO: 27.8 PG (ref 26–33)
MCHC RBC AUTO-ENTMCNC: 31.2 GM/DL (ref 32.2–35.5)
MCV RBC AUTO: 89 FL (ref 81–99)
MONOCYTES ABSOLUTE: 0.6 THOU/MM3 (ref 0.4–1.3)
MONOCYTES NFR BLD AUTO: 9.8 %
NEUTROPHILS ABSOLUTE: 3.1 THOU/MM3 (ref 1.8–7.7)
NEUTROPHILS NFR BLD AUTO: 54.7 %
NRBC BLD AUTO-RTO: 0 /100 WBC
PLATELET # BLD AUTO: 179 THOU/MM3 (ref 130–400)
PMV BLD AUTO: 10.7 FL (ref 9.4–12.4)
POTASSIUM SERPL-SCNC: 3.8 MEQ/L (ref 3.5–5.2)
PROT SERPL-MCNC: 7.3 G/DL (ref 6.4–8.3)
RBC # BLD AUTO: 5.36 MILL/MM3 (ref 4.2–5.4)
SODIUM SERPL-SCNC: 139 MEQ/L (ref 135–145)
T4 FREE SERPL-MCNC: 1 NG/DL (ref 0.92–1.68)
TRIGL SERPL-MCNC: 102 MG/DL (ref 0–199)
TSH SERPL DL<=0.05 MIU/L-ACNC: 1.64 UIU/ML (ref 0.27–4.2)
VIT B12 SERPL-MCNC: 1132 PG/ML (ref 232–1245)
WBC # BLD AUTO: 5.7 THOU/MM3 (ref 4.8–10.8)

## 2025-04-07 ENCOUNTER — TELEPHONE (OUTPATIENT)
Dept: FAMILY MEDICINE CLINIC | Age: 80
End: 2025-04-07

## 2025-04-07 NOTE — TELEPHONE ENCOUNTER
Pt informed of gavin . Pt voiced understanding with no further questions at this time.     She is taking 20 mg pravastatin nightly.

## 2025-04-07 NOTE — TELEPHONE ENCOUNTER
Message  Received: 3 days ago  Sayra Cottrell, APRN - CNP  P [facility]    Lipid panel revealed elevated LDL (bad fat).  I recommend following a low fat diet, exercise at least 30 min/day 3-5 days a week, try to include foods rich in Omega-3 fatty acids (salmon, walnuts, flaxseed, etc) and soluble fiber (oatmeal, kidney beans, apples, etc) in your diet.  She still taking her Pravastatin nightly?      Other labs are stable.      The 10-year ASCVD risk score (Hiro FORMAN, et al., 2019) is: 41.4%    Values used to calculate the score:      Age: 79 years      Sex: Female      Is Non- : No      Diabetic: Yes      Tobacco smoker: No      Systolic Blood Pressure: 122 mmHg      Is BP treated: Yes      HDL Cholesterol: 70 mg/dL      Total Cholesterol: 221 mg/dL

## 2025-04-08 NOTE — TELEPHONE ENCOUNTER
Pt informed of recommendations and medication to continue . Pt voiced understanding with no further questions at this time.

## 2025-04-08 NOTE — TELEPHONE ENCOUNTER
Pt stated she was on the 20mg previously but checked her bottle and saw that it is 40mg. She has been taking 40 mg nightly.

## 2025-04-08 NOTE — TELEPHONE ENCOUNTER
Continue Pravastatin.  I recommend following a low fat diet, exercise at least 30 min/day 3-5 days a week, try to include foods rich in Omega-3 fatty acids (salmon, walnuts, flaxseed, etc) and soluble fiber (oatmeal, kidney beans, apples, etc) in your diet.

## 2025-04-08 NOTE — TELEPHONE ENCOUNTER
Left message on answering machine. Requested pt to call back at [phone], at their earliest convenience.

## 2025-04-29 ENCOUNTER — OFFICE VISIT (OUTPATIENT)
Dept: FAMILY MEDICINE CLINIC | Age: 80
End: 2025-04-29
Payer: MEDICARE

## 2025-04-29 VITALS
RESPIRATION RATE: 14 BRPM | OXYGEN SATURATION: 94 % | HEART RATE: 98 BPM | DIASTOLIC BLOOD PRESSURE: 78 MMHG | HEIGHT: 61 IN | SYSTOLIC BLOOD PRESSURE: 124 MMHG | TEMPERATURE: 97.8 F | BODY MASS INDEX: 46.07 KG/M2 | WEIGHT: 244 LBS

## 2025-04-29 DIAGNOSIS — E66.813 CLASS 3 SEVERE OBESITY WITH SERIOUS COMORBIDITY AND BODY MASS INDEX (BMI) OF 45.0 TO 49.9 IN ADULT, UNSPECIFIED OBESITY TYPE (HCC): ICD-10-CM

## 2025-04-29 DIAGNOSIS — L29.9 ITCHING: ICD-10-CM

## 2025-04-29 DIAGNOSIS — I10 HTN, GOAL BELOW 140/90: ICD-10-CM

## 2025-04-29 DIAGNOSIS — E11.69 TYPE 2 DIABETES MELLITUS WITH OTHER SPECIFIED COMPLICATION, WITHOUT LONG-TERM CURRENT USE OF INSULIN (HCC): Primary | ICD-10-CM

## 2025-04-29 LAB
CREAT UR-MCNC: 84.1 MG/DL
HBA1C MFR BLD: 6.9 % (ref 4.3–5.7)
MICROALBUMIN UR-MCNC: < 2 MG/DL
MICROALBUMIN/CREAT RATIO PNL UR: 24 MG/G (ref 0–30)

## 2025-04-29 PROCEDURE — 1123F ACP DISCUSS/DSCN MKR DOCD: CPT | Performed by: NURSE PRACTITIONER

## 2025-04-29 PROCEDURE — 83036 HEMOGLOBIN GLYCOSYLATED A1C: CPT | Performed by: NURSE PRACTITIONER

## 2025-04-29 PROCEDURE — G8417 CALC BMI ABV UP PARAM F/U: HCPCS | Performed by: NURSE PRACTITIONER

## 2025-04-29 PROCEDURE — 3078F DIAST BP <80 MM HG: CPT | Performed by: NURSE PRACTITIONER

## 2025-04-29 PROCEDURE — G8427 DOCREV CUR MEDS BY ELIG CLIN: HCPCS | Performed by: NURSE PRACTITIONER

## 2025-04-29 PROCEDURE — 1159F MED LIST DOCD IN RCRD: CPT | Performed by: NURSE PRACTITIONER

## 2025-04-29 PROCEDURE — G8399 PT W/DXA RESULTS DOCUMENT: HCPCS | Performed by: NURSE PRACTITIONER

## 2025-04-29 PROCEDURE — 1036F TOBACCO NON-USER: CPT | Performed by: NURSE PRACTITIONER

## 2025-04-29 PROCEDURE — 3044F HG A1C LEVEL LT 7.0%: CPT | Performed by: NURSE PRACTITIONER

## 2025-04-29 PROCEDURE — 1090F PRES/ABSN URINE INCON ASSESS: CPT | Performed by: NURSE PRACTITIONER

## 2025-04-29 PROCEDURE — 3074F SYST BP LT 130 MM HG: CPT | Performed by: NURSE PRACTITIONER

## 2025-04-29 PROCEDURE — 99214 OFFICE O/P EST MOD 30 MIN: CPT | Performed by: NURSE PRACTITIONER

## 2025-04-29 NOTE — PROGRESS NOTES
Dyana Mancera is a 79 y.o. female thatpresents for Follow-up and Diabetes      History obtained today from Patient.    History of Present Illness  The patient presents for evaluation of diabetes, pruritus, and fatty liver.    The patient has not been monitoring her blood glucose levels recently and reports no symptoms of hyperglycemia such as excessive thirst, hunger, or urination. Persistent hunger is noted but is attributed to her normal appetite. She is currently on Jardiance for diabetes management.    Intermittent headaches are reported, with a decrease in frequency compared to previous episodes. Generalized pruritus, particularly on the scalp and back, is present without any associated rash. A previous diagnosis of sun allergy by her primary care physician caused itching during sun exposure. However, itching has persisted since her last CT scan, even in cold weather. Relief has been achieved using a topical cream prescribed by her dermatologist for a rash on her ankles and legs. Daily moisturizer is not applied. A history of osteoarthritis is noted, with uncertainty if symptoms are related to this condition. No known allergies to dyes are reported. Zyrtec and prednisone were prescribed for sinus issues last month, but prednisone has not been taken.    A history of fatty liver disease is present, with care under a gastroenterologist, although no recent consultation has occurred. Medication for this condition is being taken.    Occasional numbness in the hands during sleep is reported, believed to be positional. No chest pain is experienced. Blood pressure is not regularly monitored but was elevated prior to starting antihypertensive medication. Mild ankle swelling at the end of the day is noted, which improves with leg elevation. Poor sleep quality is reported, often resulting in sleeping in a recliner. Slow cycling on a recumbent stationary bike is performed due to hip discomfort.        I have reviewed

## 2025-04-30 ENCOUNTER — RESULTS FOLLOW-UP (OUTPATIENT)
Dept: FAMILY MEDICINE CLINIC | Age: 80
End: 2025-04-30

## 2025-04-30 NOTE — TELEPHONE ENCOUNTER
Left detailed message regarding urine results. Okay per HIPAA. Requested call back at [phone]  if they have any further questions.

## 2025-05-06 ENCOUNTER — TELEPHONE (OUTPATIENT)
Dept: FAMILY MEDICINE CLINIC | Age: 80
End: 2025-05-06

## 2025-05-06 NOTE — TELEPHONE ENCOUNTER
Patient states that the steroid helped a lot, not much itching at all the cream and lotion seemed to help as well. Will let us know if anything changes

## 2025-05-06 NOTE — TELEPHONE ENCOUNTER
----- Message from TESS Gonzalez CNP sent at 5/6/2025 12:25 PM EDT -----    ----- Message -----  From: Sayra Cottrell APRN - CNP  Sent: 5/6/2025   8:00 AM EDT  To: TESS Jang CNP    Please call and check on her itching since starting the steroid, lotions, and steroid cream.

## 2025-05-13 DIAGNOSIS — H66.90 ACUTE OTITIS MEDIA, UNSPECIFIED OTITIS MEDIA TYPE: ICD-10-CM

## 2025-05-13 DIAGNOSIS — J32.9 RHINOSINUSITIS: ICD-10-CM

## 2025-05-13 RX ORDER — CETIRIZINE HYDROCHLORIDE 10 MG/1
10 TABLET ORAL DAILY
Qty: 90 TABLET | Refills: 3 | Status: SHIPPED | OUTPATIENT
Start: 2025-05-13

## 2025-05-13 NOTE — TELEPHONE ENCOUNTER
Recent Visits  Date Type Provider Dept   04/29/25 Office Visit Sayar Cottrell, APRN - CNP [facility]   03/05/25 Office Visit Sayra Cottrell, APRN - CNP [facility]   10/23/24 Office Visit Sayra Cottrell, APRN - CNP [facility]   06/20/24 Office Visit Sayra Cottrell, APRN - CNP [facility]   04/23/24 Office Visit Sayra Cottrell, APRN - CNP [facility]   03/12/24 Office Visit Sayra Cottrell, APRN - CNP [facility]   02/20/24 Office Visit Sayra Cottrell, APRN - CNP [facility]   12/07/23 Office Visit Sayra Cottrell, APRN - CNP [facility]   Showing recent visits within past 540 days with a meds authorizing provider and meeting all other requirements  Future Appointments  No visits were found meeting these conditions.  Showing future appointments within next 150 days with a meds authorizing provider and meeting all other requirements

## 2025-05-17 ENCOUNTER — HOSPITAL ENCOUNTER (EMERGENCY)
Age: 80
Discharge: HOME | End: 2025-05-17
Payer: MEDICARE

## 2025-05-17 VITALS
HEART RATE: 116 BPM | WEIGHT: 238 LBS | DIASTOLIC BLOOD PRESSURE: 75 MMHG | SYSTOLIC BLOOD PRESSURE: 152 MMHG | BODY MASS INDEX: 45 KG/M2 | OXYGEN SATURATION: 90 % | TEMPERATURE: 98.6 F | RESPIRATION RATE: 18 BRPM

## 2025-05-17 DIAGNOSIS — B37.9 YEAST INFECTION: Primary | ICD-10-CM

## 2025-05-17 PROCEDURE — 99213 OFFICE O/P EST LOW 20 MIN: CPT

## 2025-05-17 RX ORDER — KETOCONAZOLE 20 MG/G
CREAM TOPICAL
Qty: 30 G | Refills: 0 | Status: SHIPPED | OUTPATIENT
Start: 2025-05-17 | End: 2025-05-19

## 2025-05-17 ASSESSMENT — ENCOUNTER SYMPTOMS
NAUSEA: 0
RHINORRHEA: 0
DIARRHEA: 0
WHEEZING: 0
PHOTOPHOBIA: 0
VOMITING: 0
CONSTIPATION: 0
SINUS PAIN: 0
CHEST TIGHTNESS: 0
ABDOMINAL PAIN: 0
EYE PAIN: 0
COUGH: 0
BACK PAIN: 0
APNEA: 0
SORE THROAT: 0
TROUBLE SWALLOWING: 0
SHORTNESS OF BREATH: 0

## 2025-05-17 ASSESSMENT — PAIN DESCRIPTION - LOCATION: LOCATION: LEG

## 2025-05-17 ASSESSMENT — PAIN DESCRIPTION - ORIENTATION: ORIENTATION: RIGHT

## 2025-05-17 ASSESSMENT — PAIN DESCRIPTION - PAIN TYPE: TYPE: ACUTE PAIN

## 2025-05-17 ASSESSMENT — PAIN SCALES - GENERAL: PAINLEVEL_OUTOF10: 5

## 2025-05-17 ASSESSMENT — PAIN - FUNCTIONAL ASSESSMENT: PAIN_FUNCTIONAL_ASSESSMENT: 0-10

## 2025-05-17 ASSESSMENT — PAIN DESCRIPTION - DESCRIPTORS: DESCRIPTORS: BURNING;DISCOMFORT

## 2025-05-17 NOTE — ED NOTES
Pt ambulatory to [facility] with c/o rash to the R inner thigh, lower abdomen and spot on the buttocks. Reports noticing rash on Thursday. Denies pain or itching, however states it becomes \"irritated and burning\" with friction from pants. No other concerns noted.      Amy Mac, RN  05/17/25 [id number]

## 2025-05-17 NOTE — ED PROVIDER NOTES
[facility] URGENT CARE  Urgent Care Encounter       CHIEF COMPLAINT       Chief Complaint   Patient presents with    Rash       Nurses Notes reviewed and I agree except as noted in the HPI.  HISTORY OF PRESENT ILLNESS   Dyana Mancera is a 79 y.o. female who presents to [facility] urgent care for evaluation of rash.  Pt denies any fever, chills, fatigue, SOB, CP, light-headedness or dizziness, numbness or tingling, abd pain, N/V/D, constipation or urinary complaints.  Pt reporting that she noticed it 2 days ago when she pulled her pants down.  Reports that there was an odor as well initially.  Denies pain.  Reports irritation/discomfort with friction.  Pt reporting that she gets yeast infections under her breasts and occasionally will develop a rash in her groin when it is warm outside.  She reports that she did apply some lotrimin and noticed some improvement.  Reports that she also applied hydrocortisone cream with little relief.       The history is provided by the patient. No  was used.       REVIEW OF SYSTEMS     Review of Systems   Constitutional:  Negative for activity change, appetite change, chills, fatigue, fever and unexpected weight change.   HENT:  Negative for congestion, ear pain, hearing loss, mouth sores, nosebleeds, rhinorrhea, sinus pain, sneezing, sore throat, tinnitus and trouble swallowing.    Eyes:  Negative for photophobia, pain and visual disturbance.   Respiratory:  Negative for apnea, cough, chest tightness, shortness of breath and wheezing.    Cardiovascular:  Negative for chest pain and palpitations.   Gastrointestinal:  Negative for abdominal pain, constipation, diarrhea, nausea and vomiting.   Endocrine: Negative for cold intolerance, heat intolerance, polydipsia, polyphagia and polyuria.   Genitourinary:  Negative for difficulty urinating, dysuria, flank pain, frequency, hematuria, menstrual problem and urgency.   Musculoskeletal:  Negative for

## 2025-05-17 NOTE — DISCHARGE INSTRUCTIONS
I sent in the cream for you and powder as discussed.     I recommend using powder in any fold areas.     You can use the cream for vaginal involvement.     If you touch the area, wash hands.     Try to let the area be open to air as much as possible.

## 2025-05-19 ENCOUNTER — OFFICE VISIT (OUTPATIENT)
Dept: FAMILY MEDICINE CLINIC | Age: 80
End: 2025-05-19
Payer: MEDICARE

## 2025-05-19 VITALS
DIASTOLIC BLOOD PRESSURE: 74 MMHG | HEIGHT: 61 IN | RESPIRATION RATE: 20 BRPM | OXYGEN SATURATION: 94 % | SYSTOLIC BLOOD PRESSURE: 130 MMHG | BODY MASS INDEX: 44.56 KG/M2 | WEIGHT: 236 LBS | HEART RATE: 123 BPM | TEMPERATURE: 96.9 F

## 2025-05-19 DIAGNOSIS — B02.9 HERPES ZOSTER WITHOUT COMPLICATION: Primary | ICD-10-CM

## 2025-05-19 PROCEDURE — 1036F TOBACCO NON-USER: CPT | Performed by: NURSE PRACTITIONER

## 2025-05-19 PROCEDURE — 1090F PRES/ABSN URINE INCON ASSESS: CPT | Performed by: NURSE PRACTITIONER

## 2025-05-19 PROCEDURE — 3075F SYST BP GE 130 - 139MM HG: CPT | Performed by: NURSE PRACTITIONER

## 2025-05-19 PROCEDURE — 1125F AMNT PAIN NOTED PAIN PRSNT: CPT | Performed by: NURSE PRACTITIONER

## 2025-05-19 PROCEDURE — G8427 DOCREV CUR MEDS BY ELIG CLIN: HCPCS | Performed by: NURSE PRACTITIONER

## 2025-05-19 PROCEDURE — 3078F DIAST BP <80 MM HG: CPT | Performed by: NURSE PRACTITIONER

## 2025-05-19 PROCEDURE — 1123F ACP DISCUSS/DSCN MKR DOCD: CPT | Performed by: NURSE PRACTITIONER

## 2025-05-19 PROCEDURE — G8399 PT W/DXA RESULTS DOCUMENT: HCPCS | Performed by: NURSE PRACTITIONER

## 2025-05-19 PROCEDURE — 99213 OFFICE O/P EST LOW 20 MIN: CPT | Performed by: NURSE PRACTITIONER

## 2025-05-19 PROCEDURE — G8417 CALC BMI ABV UP PARAM F/U: HCPCS | Performed by: NURSE PRACTITIONER

## 2025-05-19 PROCEDURE — 1159F MED LIST DOCD IN RCRD: CPT | Performed by: NURSE PRACTITIONER

## 2025-05-19 RX ORDER — BETAMETHASONE VALERATE 1.2 MG/G
CREAM TOPICAL
Qty: 45 G | Refills: 1 | Status: SHIPPED | OUTPATIENT
Start: 2025-05-19

## 2025-05-19 RX ORDER — HYDROCODONE BITARTRATE AND ACETAMINOPHEN 5; 325 MG/1; MG/1
1 TABLET ORAL EVERY 6 HOURS PRN
Qty: 12 TABLET | Refills: 0 | Status: SHIPPED | OUTPATIENT
Start: 2025-05-19 | End: 2025-05-22

## 2025-05-19 RX ORDER — ACYCLOVIR 800 MG/1
800 TABLET ORAL
Qty: 35 TABLET | Refills: 0 | Status: SHIPPED | OUTPATIENT
Start: 2025-05-19 | End: 2025-05-26

## 2025-05-19 NOTE — PROGRESS NOTES
Dyana Mancera  is a 79 y.o. y/o female that presents for Rash (States went to ER on 05/17 and was dx with a yeast infection/bacterial infection. At that time was having burning sensation. No itching. Pain has worsened and also has spread down thigh. Rash on right side of the body. At night time pain gets up to a 10)      Rash    HPI:    Length of time Sx have been present - 5 day(s).  Rash has gotten worse since initially starting  Affected areas - right inner thigh, lower back  Inciting events or exposures prior to rash starting? No  Pruritic?  No  Erythematous?  Yes  Weeping or drainage?  No  History of Urticaria?  No  Fever?  No  Painful?  Yes - burning sensation        OBJECTIVE:  /74   Pulse (!) 123   Temp 96.9 °F (36.1 °C) (Temporal)   Resp 20   Ht 1.549 m (5' 1\")   Wt 107 kg (236 lb)   SpO2 94%   BMI 44.59 kg/m²   She appears well; non-toxic and in no apparent distress.   Extremities - no pedal edema noted, intact peripheral pulses  Skin - vesicular rash right inner thigh and sacral area      ASSESSMENT & PLAN  Dyana was seen today for rash.    Diagnoses and all orders for this visit:    Herpes zoster without complication  -     betamethasone valerate (VALISONE) 0.1 % cream; Apply topically 2 times daily PRN.  -     acyclovir (ZOVIRAX) 800 MG tablet; Take 1 tablet by mouth 5 times daily for 7 days  -     HYDROcodone-acetaminophen (NORCO) 5-325 MG per tablet; Take 1 tablet by mouth every 6 hours as needed for Pain for up to 3 days. Intended supply: 3 days. Take lowest dose possible to manage pain Max Daily Amount: 4 tablets    - consistent with shingles  - start topical prednisone, acyclovir  - ok for short supply of Norco for pain    Controlled Substance Monitoring:    Acute and Chronic Pain Monitoring:   RX Monitoring Periodic Controlled Substance Monitoring   5/19/2025   9:06 AM No signs of potential drug abuse or diversion identified.     Return if symptoms worsen or fail to

## 2025-05-20 ENCOUNTER — TELEPHONE (OUTPATIENT)
Dept: FAMILY MEDICINE CLINIC | Age: 80
End: 2025-05-20

## 2025-05-20 DIAGNOSIS — B02.9 HERPES ZOSTER WITHOUT COMPLICATION: Primary | ICD-10-CM

## 2025-05-20 NOTE — TELEPHONE ENCOUNTER
Pt called and stated the shingles has spread down to her knees now. The pain medication is helping some, but they are asking if there is something else that they can do to help or if that is normal? It is itchy now and didn't know what she could put on that.       [facility] pharmacy.

## 2025-05-21 RX ORDER — PREDNISONE 20 MG/1
40 TABLET ORAL DAILY
Qty: 10 TABLET | Refills: 0 | Status: SHIPPED | OUTPATIENT
Start: 2025-05-21 | End: 2025-05-26

## 2025-05-21 NOTE — TELEPHONE ENCOUNTER
Likely hasn't been long enough to see an improvement.  Called yesterday, only been on the meds 24 hours.  Would she want to add oral steroids (Prednisone)?

## 2025-05-30 ENCOUNTER — HOSPITAL ENCOUNTER (OUTPATIENT)
Dept: GENERAL RADIOLOGY | Age: 80
Discharge: HOME | End: 2025-05-30
Payer: MEDICARE

## 2025-05-30 ENCOUNTER — RESULTS FOLLOW-UP (OUTPATIENT)
Dept: FAMILY MEDICINE CLINIC | Age: 80
End: 2025-05-30

## 2025-05-30 ENCOUNTER — HOSPITAL ENCOUNTER (OUTPATIENT)
Age: 80
Discharge: HOME | End: 2025-05-30
Payer: MEDICARE

## 2025-05-30 ENCOUNTER — APPOINTMENT (OUTPATIENT)
Dept: CT IMAGING | Age: 80
End: 2025-05-30
Payer: MEDICARE

## 2025-05-30 ENCOUNTER — HOSPITAL ENCOUNTER (EMERGENCY)
Age: 80
Discharge: HOME | End: 2025-05-30
Attending: EMERGENCY MEDICINE
Payer: MEDICARE

## 2025-05-30 ENCOUNTER — OFFICE VISIT (OUTPATIENT)
Dept: FAMILY MEDICINE CLINIC | Age: 80
End: 2025-05-30
Payer: MEDICARE

## 2025-05-30 VITALS
SYSTOLIC BLOOD PRESSURE: 128 MMHG | WEIGHT: 231 LBS | TEMPERATURE: 97.6 F | DIASTOLIC BLOOD PRESSURE: 82 MMHG | HEIGHT: 61 IN | HEART RATE: 88 BPM | RESPIRATION RATE: 18 BRPM | OXYGEN SATURATION: 95 % | BODY MASS INDEX: 43.61 KG/M2

## 2025-05-30 VITALS
BODY MASS INDEX: 43.65 KG/M2 | TEMPERATURE: 97.6 F | WEIGHT: 231 LBS | RESPIRATION RATE: 23 BRPM | HEART RATE: 109 BPM | DIASTOLIC BLOOD PRESSURE: 78 MMHG | OXYGEN SATURATION: 92 % | SYSTOLIC BLOOD PRESSURE: 137 MMHG

## 2025-05-30 DIAGNOSIS — B02.8 HERPES ZOSTER WITH OTHER COMPLICATION: ICD-10-CM

## 2025-05-30 DIAGNOSIS — R19.7 DIARRHEA, UNSPECIFIED TYPE: Primary | ICD-10-CM

## 2025-05-30 DIAGNOSIS — L30.4 INTERTRIGO: ICD-10-CM

## 2025-05-30 DIAGNOSIS — R09.81 NASAL CONGESTION: ICD-10-CM

## 2025-05-30 DIAGNOSIS — R19.5 WATERY STOOLS: ICD-10-CM

## 2025-05-30 DIAGNOSIS — L03.319 CELLULITIS OF TRUNK, UNSPECIFIED SITE OF TRUNK: ICD-10-CM

## 2025-05-30 DIAGNOSIS — R21 RASH: ICD-10-CM

## 2025-05-30 LAB
ALBUMIN SERPL BCG-MCNC: 4.1 G/DL (ref 3.4–4.9)
ALP SERPL-CCNC: 89 U/L (ref 38–126)
ALT SERPL W/O P-5'-P-CCNC: 49 U/L (ref 10–35)
ANION GAP SERPL CALC-SCNC: 12 MEQ/L (ref 8–16)
AST SERPL-CCNC: 46 U/L (ref 10–35)
BASOPHILS ABSOLUTE: 0 THOU/MM3 (ref 0–0.1)
BASOPHILS NFR BLD AUTO: 0.6 %
BILIRUB CONJ SERPL-MCNC: 0.3 MG/DL (ref 0–0.2)
BILIRUB SERPL-MCNC: 0.6 MG/DL (ref 0.3–1.2)
BILIRUB UR QL STRIP.AUTO: NEGATIVE
BUN SERPL-MCNC: 10 MG/DL (ref 8–23)
CALCIUM SERPL-MCNC: 9.5 MG/DL (ref 8.8–10.2)
CHARACTER UR: CLEAR
CHLORIDE SERPL-SCNC: 103 MEQ/L (ref 98–111)
CO2 SERPL-SCNC: 24 MEQ/L (ref 22–29)
COLOR, UA: YELLOW
CREAT SERPL-MCNC: 0.7 MG/DL (ref 0.5–0.9)
DEPRECATED RDW RBC AUTO: 47.4 FL (ref 35–45)
EKG ATRIAL RATE: 93 BPM
EKG P AXIS: 77 DEGREES
EKG P-R INTERVAL: 178 MS
EKG Q-T INTERVAL: 392 MS
EKG QRS DURATION: 142 MS
EKG QTC CALCULATION (BAZETT): 487 MS
EKG R AXIS: -18 DEGREES
EKG T AXIS: 124 DEGREES
EKG VENTRICULAR RATE: 93 BPM
EOSINOPHIL NFR BLD AUTO: 1.4 %
EOSINOPHILS ABSOLUTE: 0.1 THOU/MM3 (ref 0–0.4)
ERYTHROCYTE [DISTWIDTH] IN BLOOD BY AUTOMATED COUNT: 14.8 % (ref 11.5–14.5)
GFR SERPL CREATININE-BSD FRML MDRD: 89 ML/MIN/1.73M2
GLUCOSE SERPL-MCNC: 137 MG/DL (ref 74–109)
GLUCOSE UR QL STRIP.AUTO: >= 1000 MG/DL
HCT VFR BLD AUTO: 48 % (ref 37–47)
HGB BLD-MCNC: 15.2 GM/DL (ref 12–16)
HGB UR QL STRIP.AUTO: NEGATIVE
IMM GRANULOCYTES # BLD AUTO: 0.01 THOU/MM3 (ref 0–0.07)
IMM GRANULOCYTES NFR BLD AUTO: 0.1 %
KETONES UR QL STRIP.AUTO: NEGATIVE
LIPASE SERPL-CCNC: 26 U/L (ref 13–60)
LYMPHOCYTES ABSOLUTE: 1.9 THOU/MM3 (ref 1–4.8)
LYMPHOCYTES NFR BLD AUTO: 26.5 %
MCH RBC QN AUTO: 27.7 PG (ref 26–33)
MCHC RBC AUTO-ENTMCNC: 31.7 GM/DL (ref 32.2–35.5)
MCV RBC AUTO: 87.4 FL (ref 81–99)
MONOCYTES ABSOLUTE: 0.6 THOU/MM3 (ref 0.4–1.3)
MONOCYTES NFR BLD AUTO: 8.5 %
NEUTROPHILS ABSOLUTE: 4.5 THOU/MM3 (ref 1.8–7.7)
NEUTROPHILS NFR BLD AUTO: 62.9 %
NITRITE UR QL STRIP: NEGATIVE
NRBC BLD AUTO-RTO: 0 /100 WBC
NT-PROBNP SERPL IA-MCNC: 69 PG/ML (ref 0–449)
OSMOLALITY SERPL CALC.SUM OF ELEC: 278.7 MOSMOL/KG (ref 275–300)
PH UR STRIP.AUTO: 7.5 [PH] (ref 5–9)
PLATELET # BLD AUTO: 188 THOU/MM3 (ref 130–400)
PMV BLD AUTO: 9.7 FL (ref 9.4–12.4)
POTASSIUM SERPL-SCNC: 4.2 MEQ/L (ref 3.5–5.2)
PROT SERPL-MCNC: 7.2 G/DL (ref 6.4–8.3)
PROT UR STRIP.AUTO-MCNC: NEGATIVE MG/DL
RBC # BLD AUTO: 5.49 MILL/MM3 (ref 4.2–5.4)
SODIUM SERPL-SCNC: 139 MEQ/L (ref 135–145)
SP GR UR REFRACT.AUTO: 1.01 (ref 1–1.03)
TROPONIN, HIGH SENSITIVITY: 8 NG/L (ref 0–12)
UROBILINOGEN, URINE: 0.2 EU/DL (ref 0–1)
WBC # BLD AUTO: 7.1 THOU/MM3 (ref 4.8–10.8)
WBC #/AREA URNS HPF: NEGATIVE /[HPF]

## 2025-05-30 PROCEDURE — 1090F PRES/ABSN URINE INCON ASSESS: CPT | Performed by: NURSE PRACTITIONER

## 2025-05-30 PROCEDURE — 3079F DIAST BP 80-89 MM HG: CPT | Performed by: NURSE PRACTITIONER

## 2025-05-30 PROCEDURE — 6360000004 HC RX CONTRAST MEDICATION

## 2025-05-30 PROCEDURE — 99285 EMERGENCY DEPT VISIT HI MDM: CPT

## 2025-05-30 PROCEDURE — 83880 ASSAY OF NATRIURETIC PEPTIDE: CPT

## 2025-05-30 PROCEDURE — 84484 ASSAY OF TROPONIN QUANT: CPT

## 2025-05-30 PROCEDURE — 80048 BASIC METABOLIC PNL TOTAL CA: CPT

## 2025-05-30 PROCEDURE — 87635 SARS-COV-2 COVID-19 AMP PRB: CPT | Performed by: NURSE PRACTITIONER

## 2025-05-30 PROCEDURE — 36415 COLL VENOUS BLD VENIPUNCTURE: CPT

## 2025-05-30 PROCEDURE — 1036F TOBACCO NON-USER: CPT | Performed by: NURSE PRACTITIONER

## 2025-05-30 PROCEDURE — 1123F ACP DISCUSS/DSCN MKR DOCD: CPT | Performed by: NURSE PRACTITIONER

## 2025-05-30 PROCEDURE — 83690 ASSAY OF LIPASE: CPT

## 2025-05-30 PROCEDURE — 74018 RADEX ABDOMEN 1 VIEW: CPT

## 2025-05-30 PROCEDURE — 74177 CT ABD & PELVIS W/CONTRAST: CPT

## 2025-05-30 PROCEDURE — 81003 URINALYSIS AUTO W/O SCOPE: CPT

## 2025-05-30 PROCEDURE — G8427 DOCREV CUR MEDS BY ELIG CLIN: HCPCS | Performed by: NURSE PRACTITIONER

## 2025-05-30 PROCEDURE — 1159F MED LIST DOCD IN RCRD: CPT | Performed by: NURSE PRACTITIONER

## 2025-05-30 PROCEDURE — G8399 PT W/DXA RESULTS DOCUMENT: HCPCS | Performed by: NURSE PRACTITIONER

## 2025-05-30 PROCEDURE — 93005 ELECTROCARDIOGRAM TRACING: CPT | Performed by: EMERGENCY MEDICINE

## 2025-05-30 PROCEDURE — 99215 OFFICE O/P EST HI 40 MIN: CPT | Performed by: NURSE PRACTITIONER

## 2025-05-30 PROCEDURE — 80076 HEPATIC FUNCTION PANEL: CPT

## 2025-05-30 PROCEDURE — G8417 CALC BMI ABV UP PARAM F/U: HCPCS | Performed by: NURSE PRACTITIONER

## 2025-05-30 PROCEDURE — 3074F SYST BP LT 130 MM HG: CPT | Performed by: NURSE PRACTITIONER

## 2025-05-30 PROCEDURE — 85025 COMPLETE CBC W/AUTO DIFF WBC: CPT

## 2025-05-30 RX ORDER — IOPAMIDOL 755 MG/ML
80 INJECTION, SOLUTION INTRAVASCULAR
Status: COMPLETED | OUTPATIENT
Start: 2025-05-30 | End: 2025-05-30

## 2025-05-30 RX ORDER — PREDNISONE 20 MG/1
40 TABLET ORAL DAILY
Qty: 14 TABLET | Refills: 0 | Status: SHIPPED | OUTPATIENT
Start: 2025-05-30 | End: 2025-06-06

## 2025-05-30 RX ORDER — DOXYCYCLINE HYCLATE 100 MG
100 TABLET ORAL 2 TIMES DAILY
Qty: 14 TABLET | Refills: 0 | Status: SHIPPED | OUTPATIENT
Start: 2025-05-30 | End: 2025-06-06

## 2025-05-30 RX ORDER — FLUCONAZOLE 100 MG/1
TABLET ORAL
Qty: 9 TABLET | Refills: 0 | Status: SHIPPED | OUTPATIENT
Start: 2025-05-30

## 2025-05-30 RX ORDER — MICONAZOLE NITRATE 20 MG/G
POWDER TOPICAL
Qty: 45 G | Refills: 1 | Status: SHIPPED | OUTPATIENT
Start: 2025-05-30

## 2025-05-30 RX ADMIN — IOPAMIDOL 80 ML: 755 INJECTION, SOLUTION INTRAVENOUS at 16:55

## 2025-05-30 ASSESSMENT — PAIN DESCRIPTION - PAIN TYPE: TYPE: ACUTE PAIN

## 2025-05-30 ASSESSMENT — PAIN DESCRIPTION - ORIENTATION: ORIENTATION: RIGHT

## 2025-05-30 ASSESSMENT — PAIN DESCRIPTION - LOCATION: LOCATION: ABDOMEN

## 2025-05-30 ASSESSMENT — PAIN SCALES - GENERAL: PAINLEVEL_OUTOF10: 3

## 2025-05-30 NOTE — PROGRESS NOTES
Dyana Mancera is a 79 y.o. female thatpresents for Diarrhea (Started about 3 days ago/Still has rash /)      History obtained today from Patient and .    History of Present Illness  The patient presents for evaluation of a rash, diarrhea, and congestion.    She initially sought medical attention at an urgent care facility where her condition was diagnosed as a yeast infection.  Initially rash was just on the groin.  Was sore and reddened.  She was prescribed a cream and powder at the urgent care, but the powder was unavailable at [place]. She has used Monistat gel in the past for similar rashes, wasn't helping.     A couple days later a rash started on the right anterior thigh, came in, saw mariana Everett with shingles, started on antiviral, Betamethasone, and pain medication.  Few days later pt messaged about her pain, sent Prednisone in for her.  Was doing better on this but once done with the Prednisone things worsened.  Rash is now starting midline lower back extending around the right side of the back, buttock, and down right upper anterior thigh.  She describes the rash as sore and sensitive to touch.  She also experiences pain in areas without visible lesions.  Notes a few of these lesions have since drained purulent fluid.  Denies fever or chills last couple days. She applied Neosporin to the affected area yesterday, which provided some relief. She has not worn jeans since the onset of the rash.       She experienced severe pain in her side, described as a \"large knot\", followed by diarrhea on Tuesday and Wednesday. Still having diarrhea.  Says it is liquid, no formed pieces.  She has been cautious with her diet since then following a BRAT diet. Notes decreased appetite and some nausea.  She has been taking Tylenol once in the evening, which seems to help a little. She has reduced her pain medication to half a pill due to concerns about constipation. She was not taking a stool softener while on

## 2025-05-30 NOTE — DISCHARGE INSTRUCTIONS
Your evaluated emergency room today for change in bowel habits.  Your PCP did a x-ray of your abdomen, and were concern for ileus, which is a slowing in motility of your stool.  However, we did a CT abdomen pelvis that did not show evidence of this.  You will be discharged with no new medications, but is recommended you continue taking your MiraLAX supplementation to help with normal stool production.  This is important, as you are on medications that can create issues of constipation/diarrhea.  Please come back to emergency room if develop high fever/chills, significant abdominal pain, or inability to go poop.

## 2025-05-30 NOTE — ED NOTES
Patient presents to the ED after being advised by her PCP to be seen for an abnormal KUB that was completed this morning, suggesting a possible mild ileus. Patient states she was diagnosed with shingles 2 weeks ago. She was reporting right sided back and abdominal pain to which her PCP ordered the KUB. Patient rates pain at a 3/10. She reports diarrhea this morning. VSS. EKG completed. No signs of distress.

## 2025-05-30 NOTE — TELEPHONE ENCOUNTER
Pt  informed of results and to go to the ER . Pt  voiced understanding with no further questions at this time.

## 2025-05-30 NOTE — ED PROVIDER NOTES
[facility] EMERGENCY DEPARTMENT  EMERGENCY DEPARTMENT ENCOUNTER          Pt Name: Dyana Mancera  MRN: [id number]  Birthdate 1945  Date of evaluation: 5/30/2025  Physician: Lasha Fairchild MD  Supervising Attending Physician: Kathleen Ma MD       CHIEF COMPLAINT       Chief Complaint   Patient presents with    sent by PCP for abnormal KUB         HISTORY OF PRESENT ILLNESS    HPI  Dyana Mancera is a 79 y.o. female who presents to the emergency department from home, by private vehicle for evaluation of abnormal KUB done at PCP office.  The patient has had 3 days worth of change in bowel habits to include constipation and intermittent diarrhea, abdominal distention.  The patient was recently treated for shingles with an opioid, and this is when she started develop GI upset, was not on any prophylactic bowel regimen related to the constipation effect of opioids.  The patient endorses right lower quadrant pain, and urinary frequency and dysuria in association with the pain she is experiencing.  The patient denies hematuria also denies hematochezia or melena.  The patient denies any abdominal trauma.  She also has a rash under her pannus, which is being treated with fluconazole and steroids by her PCP.  She was treated with opioids, antivirals, steroids for the shingles rash that she has, and states that is improving.  She did have a culture swab done of the shingles rash sent by PCP today.  The patient has no other acute complaints at this time.      REVIEW OF SYSTEMS   Review of Systems      PAST MEDICAL AND SURGICAL HISTORY     Past Medical History:   Diagnosis Date    Acid reflux     Arthritis     Back pain     Breast cancer (HCC) 2014    left age 69    Cancer (HCC)     breast left    Constipation     Hyperlipidemia     Hypertension     Kidney cysts     Lung mass 2013    left lung no longer there (when they went to do biopsy it was gone)    Ulcer      Past Surgical History:    Procedure Laterality Date    APPENDECTOMY  11/20/2014    Dr. Price    BREAST SURGERY Left 12/18/2014    Left Simple Mastectomy with [place] Lymph Node Biopsy - Dr. Price    BRONCHOSCOPY      CHOLECYSTECTOMY      COLONOSCOPY      ENDOMETRIAL BIOPSY      HERNIA REPAIR  11/20/2014    incarcertated herina repair    MASTECTOMY Left     age 69    TUBAL LIGATION      UPPER GASTROINTESTINAL ENDOSCOPY      URETHRA SURGERY      stretched         MEDICATIONS   No current facility-administered medications for this encounter.    Current Outpatient Medications:     doxycycline hyclate (VIBRA-TABS) 100 MG tablet, Take 1 tablet by mouth 2 times daily for 7 days, Disp: 14 tablet, Rfl: 0    predniSONE (DELTASONE) 20 MG tablet, Take 2 tablets by mouth daily for 7 days, Disp: 14 tablet, Rfl: 0    miconazole (ZEASORB-AF) 2 % powder, Apply topically 2 times daily., Disp: 45 g, Rfl: 1    fluconazole (DIFLUCAN) 100 MG tablet, Take 1 tablet daily x 5 days then take 1 tab weekly x 4 weeks for a total of 9 doses, Disp: 9 tablet, Rfl: 0    betamethasone valerate (VALISONE) 0.1 % cream, Apply topically 2 times daily PRN., Disp: 45 g, Rfl: 1    cetirizine (ZYRTEC) 10 MG tablet, TAKE 1 TABLET DAILY, Disp: 90 tablet, Rfl: 3    pravastatin (PRAVACHOL) 40 MG tablet, TAKE 1 TABLET DAILY, Disp: 90 tablet, Rfl: 3    JARDIANCE 10 MG tablet, TAKE 1 TABLET DAILY, Disp: 90 tablet, Rfl: 3    amLODIPine (NORVASC) 10 MG tablet, Take 1 tablet by mouth daily, Disp: 90 tablet, Rfl: 3    blood glucose monitor strips, Test 1 times a day, Disp: 90 strip, Rfl: 3    desonide (DESOWEN) 0.05 % cream, Apply topically 2 times daily., Disp: 60 g, Rfl: 1    losartan (COZAAR) 100 MG tablet, Take 1 tablet by mouth daily, Disp: 90 tablet, Rfl: 3    omeprazole (PRILOSEC) 20 MG delayed release capsule, Take 1 capsule by mouth daily, Disp: 90 capsule, Rfl: 3    solifenacin (VESICARE) 5 MG tablet, Take 2 tablets by mouth daily, Disp: 180 tablet, Rfl: 3    metoprolol succinate

## 2025-05-30 NOTE — ED PROVIDER NOTES

## 2025-05-31 LAB
BACTERIA SPEC AEROBE CULT: NORMAL
GRAM STN SPEC: NORMAL

## 2025-06-02 ENCOUNTER — TELEPHONE (OUTPATIENT)
Dept: FAMILY MEDICINE CLINIC | Age: 80
End: 2025-06-02

## 2025-06-02 DIAGNOSIS — R19.7 DIARRHEA OF PRESUMED INFECTIOUS ORIGIN: Primary | ICD-10-CM

## 2025-06-02 NOTE — TELEPHONE ENCOUNTER
Pt informed of stopping prednisone . Pt voiced understanding with no further questions at this time.      will come get the kit for her

## 2025-06-02 NOTE — TELEPHONE ENCOUNTER
Pt is still having the diarrhea. She is thinking it is the prednisone that is causing it. She is taking it at night. Then in then morning she takes her probiotic and omeprazole in the morning. Than about 15 mins later she is having diarrhea. She is not sure if its just the prednisone or the probiotic as well. Please advise.

## 2025-06-02 NOTE — TELEPHONE ENCOUNTER
The probiotic shouldn't cause diarrhea. If she's still taking the prednisone, I would stop it. I did order some stool cultures/testing.

## 2025-06-03 ENCOUNTER — LAB (OUTPATIENT)
Dept: LAB | Age: 80
End: 2025-06-03

## 2025-06-03 ENCOUNTER — RESULTS FOLLOW-UP (OUTPATIENT)
Dept: FAMILY MEDICINE CLINIC | Age: 80
End: 2025-06-03

## 2025-06-03 DIAGNOSIS — R19.7 DIARRHEA OF PRESUMED INFECTIOUS ORIGIN: ICD-10-CM

## 2025-06-03 LAB
C DIFF GDH STL QL: NEGATIVE
WBC STL QL MICRO: NORMAL

## 2025-06-03 NOTE — TELEPHONE ENCOUNTER
----- Message from TESS Munroe CNP sent at 6/3/2025  1:59 PM EDT -----  Let Dyana know her stool test for C-diff was negative. Stool culture still pending.

## 2025-06-04 ENCOUNTER — TELEPHONE (OUTPATIENT)
Dept: FAMILY MEDICINE CLINIC | Age: 80
End: 2025-06-04

## 2025-06-04 NOTE — TELEPHONE ENCOUNTER
Karlos Eric, APRN - CNP  P [facility]    Let Dyana know her stool culture was negative. Looks like she's got an appt tomorrow with Aleta, rec'd she f/u then to discuss the testing and her symptoms.

## 2025-06-05 ENCOUNTER — TELEPHONE (OUTPATIENT)
Dept: FAMILY MEDICINE CLINIC | Age: 80
End: 2025-06-05

## 2025-06-05 DIAGNOSIS — R19.7 DIARRHEA, UNSPECIFIED TYPE: Primary | ICD-10-CM

## 2025-06-05 DIAGNOSIS — D64.89 ANEMIA DUE TO OTHER CAUSE, NOT CLASSIFIED: ICD-10-CM

## 2025-06-05 DIAGNOSIS — D64.9 ANEMIA, UNSPECIFIED TYPE: ICD-10-CM

## 2025-06-05 LAB — BACTERIA STL CULT: NORMAL

## 2025-06-05 RX ORDER — DICYCLOMINE HYDROCHLORIDE 10 MG/1
10 CAPSULE ORAL 3 TIMES DAILY PRN
Qty: 30 CAPSULE | Refills: 0 | Status: SHIPPED | OUTPATIENT
Start: 2025-06-05

## 2025-06-05 NOTE — TELEPHONE ENCOUNTER
Patient called in stating she is not able to come in for her appt today due to continued diarrhea with also right sided pain. Please advise.

## 2025-06-05 NOTE — TELEPHONE ENCOUNTER
Stool testing ordered   Bentyl ordered for diarrhea, start after they get sample  [place] diet, push clear liquids

## 2025-06-06 ENCOUNTER — LAB (OUTPATIENT)
Dept: LAB | Age: 80
End: 2025-06-06

## 2025-06-06 DIAGNOSIS — D64.89 ANEMIA DUE TO OTHER CAUSE, NOT CLASSIFIED: ICD-10-CM

## 2025-06-06 DIAGNOSIS — R19.7 DIARRHEA, UNSPECIFIED TYPE: ICD-10-CM

## 2025-06-06 DIAGNOSIS — I10 HTN, GOAL BELOW 140/90: ICD-10-CM

## 2025-06-06 DIAGNOSIS — D64.9 ANEMIA, UNSPECIFIED TYPE: ICD-10-CM

## 2025-06-06 RX ORDER — METOPROLOL SUCCINATE 25 MG/1
25 TABLET, EXTENDED RELEASE ORAL DAILY
Qty: 90 TABLET | Refills: 3 | Status: SHIPPED | OUTPATIENT
Start: 2025-06-06

## 2025-06-06 NOTE — TELEPHONE ENCOUNTER
Recent Visits  Date Type Provider Dept   05/30/25 Office Visit Sayra Cottrell, APRN - CNP [facility]   05/19/25 Office Visit Karlos Eric, APRN - CNP [facility]   04/29/25 Office Visit Sayra Cottrell, APRN - CNP [facility]   03/05/25 Office Visit Sayra Cottrell, APRN - CNP [facility]   10/23/24 Office Visit Sayra Cottrell, APRN - CNP [facility]   06/20/24 Office Visit Sayra Cottrell, APRN - CNP [facility]   04/23/24 Office Visit Sayra Cottrell, APRN - CNP [facility]   03/12/24 Office Visit Sayra Cottrell, APRN - CNP [facility]   02/20/24 Office Visit Sayra Cottrell, APRN - CNP [facility]   Showing recent visits within past 540 days with a meds authorizing provider and meeting all other requirements  Future Appointments  Date Type Provider Dept   10/30/25 Appointment Sayra Cottrell, APRN - CNP [facility]   Showing future appointments within next 150 days with a meds authorizing provider and meeting all other requirements

## 2025-06-19 DIAGNOSIS — K21.9 GASTROESOPHAGEAL REFLUX DISEASE WITHOUT ESOPHAGITIS: ICD-10-CM

## 2025-06-19 RX ORDER — OMEPRAZOLE 20 MG/1
20 CAPSULE, DELAYED RELEASE ORAL DAILY
Qty: 90 CAPSULE | Refills: 3 | Status: SHIPPED | OUTPATIENT
Start: 2025-06-19

## 2025-06-29 ENCOUNTER — APPOINTMENT (OUTPATIENT)
Dept: GENERAL RADIOLOGY | Age: 80
End: 2025-06-29
Payer: MEDICARE

## 2025-06-29 ENCOUNTER — HOSPITAL ENCOUNTER (EMERGENCY)
Age: 80
Discharge: HOME | End: 2025-06-30
Payer: MEDICARE

## 2025-06-29 VITALS
DIASTOLIC BLOOD PRESSURE: 74 MMHG | RESPIRATION RATE: 18 BRPM | WEIGHT: 232 LBS | SYSTOLIC BLOOD PRESSURE: 149 MMHG | HEART RATE: 97 BPM | BODY MASS INDEX: 43.8 KG/M2 | HEIGHT: 61 IN | TEMPERATURE: 98 F | OXYGEN SATURATION: 92 %

## 2025-06-29 DIAGNOSIS — S83.91XA SPRAIN OF RIGHT KNEE, UNSPECIFIED LIGAMENT, INITIAL ENCOUNTER: ICD-10-CM

## 2025-06-29 DIAGNOSIS — M25.461 KNEE EFFUSION, RIGHT: Primary | ICD-10-CM

## 2025-06-29 PROCEDURE — 73564 X-RAY EXAM KNEE 4 OR MORE: CPT

## 2025-06-29 PROCEDURE — 99283 EMERGENCY DEPT VISIT LOW MDM: CPT

## 2025-06-29 PROCEDURE — 6370000000 HC RX 637 (ALT 250 FOR IP): Performed by: PHYSICIAN ASSISTANT

## 2025-06-29 RX ORDER — IBUPROFEN 800 MG/1
800 TABLET, FILM COATED ORAL ONCE
Status: COMPLETED | OUTPATIENT
Start: 2025-06-29 | End: 2025-06-29

## 2025-06-29 RX ADMIN — IBUPROFEN 800 MG: 800 TABLET, FILM COATED ORAL at 23:00

## 2025-06-29 ASSESSMENT — PAIN SCALES - GENERAL: PAINLEVEL_OUTOF10: 6

## 2025-06-29 ASSESSMENT — PAIN DESCRIPTION - DESCRIPTORS: DESCRIPTORS: ACHING

## 2025-06-29 ASSESSMENT — PAIN DESCRIPTION - ORIENTATION: ORIENTATION: RIGHT

## 2025-06-29 ASSESSMENT — PAIN DESCRIPTION - LOCATION: LOCATION: KNEE

## 2025-06-29 ASSESSMENT — PAIN - FUNCTIONAL ASSESSMENT: PAIN_FUNCTIONAL_ASSESSMENT: 0-10

## 2025-06-30 NOTE — ED PROVIDER NOTES
[facility] EMERGENCY DEPT      Pt Name: Dyana Mancera  MRN: [id number]  Birthdate 1945  Date of evaluation: 6/29/2025  Provider: Sayra Gomez PA-C    CHIEF COMPLAINT       Chief Complaint   Patient presents with    Knee Pain       Nurses Notes reviewed and I agree except as noted in the HPI.      HISTORY OF PRESENT ILLNESS    Dyana Mancera is a 80 y.o. female who presents through the lobby from home with spouse for right knee pain.  At noon patient was trying to push her refrigerator back in place.  She then felt a pop in the front of her knee.  Since then she has had pain and swelling to the anterior aspect of the knee.  It is hard to ambulate and move her knee secondary to pain.  If she sits with her leg up it feels better.  Patient has been using her walker.  Patient denies numbness, weakness, or other complaints.  Patient does not use blood thinners.  Patient does admit that her knee had been bothering her the last couple nights and she was using Voltaren gel.  No recent antibiotic use    PAST MEDICAL HISTORY    has a past medical history of Acid reflux, Arthritis, Back pain, Breast cancer (HCC), Cancer (HCC), Constipation, Hyperlipidemia, Hypertension, Kidney cysts, Lung mass, and Ulcer.    SURGICAL HISTORY      has a past surgical history that includes Urethra surgery; Tubal ligation; Cholecystectomy; Colonoscopy; Upper gastrointestinal endoscopy; Endometrial biopsy; bronchoscopy; hernia repair (11/20/2014); Breast surgery (Left, 12/18/2014); Appendectomy (11/20/2014); and Mastectomy (Left).    CURRENT MEDICATIONS       Discharge Medication List as of 6/30/2025 12:42 AM        CONTINUE these medications which have NOT CHANGED    Details   omeprazole (PRILOSEC) 20 MG delayed release capsule TAKE 1 CAPSULE DAILY, Disp-90 capsule, R-3Normal      metoprolol succinate (TOPROL XL) 25 MG extended release tablet TAKE 1 TABLET DAILY, Disp-90 tablet, R-3Normal      dicyclomine (BENTYL) 10 MG

## 2025-06-30 NOTE — ED TRIAGE NOTES
Pt presents to ED with chief complaint of right knee pain. Pt states earlier today, she was attempting to move her refrigerator and went to push it and states she heard a pop in her right knee. Pt has had pain when ambulating since.

## 2025-07-10 ENCOUNTER — OFFICE VISIT (OUTPATIENT)
Dept: FAMILY MEDICINE CLINIC | Age: 80
End: 2025-07-10
Payer: MEDICARE

## 2025-07-10 VITALS
HEART RATE: 102 BPM | SYSTOLIC BLOOD PRESSURE: 128 MMHG | BODY MASS INDEX: 45.16 KG/M2 | HEIGHT: 61 IN | OXYGEN SATURATION: 95 % | DIASTOLIC BLOOD PRESSURE: 76 MMHG | RESPIRATION RATE: 14 BRPM | WEIGHT: 239.2 LBS | TEMPERATURE: 96.8 F

## 2025-07-10 DIAGNOSIS — M25.561 ACUTE PAIN OF RIGHT KNEE: Primary | ICD-10-CM

## 2025-07-10 PROCEDURE — 1159F MED LIST DOCD IN RCRD: CPT | Performed by: NURSE PRACTITIONER

## 2025-07-10 PROCEDURE — 1090F PRES/ABSN URINE INCON ASSESS: CPT | Performed by: NURSE PRACTITIONER

## 2025-07-10 PROCEDURE — 99214 OFFICE O/P EST MOD 30 MIN: CPT | Performed by: NURSE PRACTITIONER

## 2025-07-10 PROCEDURE — 1123F ACP DISCUSS/DSCN MKR DOCD: CPT | Performed by: NURSE PRACTITIONER

## 2025-07-10 PROCEDURE — G8427 DOCREV CUR MEDS BY ELIG CLIN: HCPCS | Performed by: NURSE PRACTITIONER

## 2025-07-10 PROCEDURE — 3074F SYST BP LT 130 MM HG: CPT | Performed by: NURSE PRACTITIONER

## 2025-07-10 PROCEDURE — 3078F DIAST BP <80 MM HG: CPT | Performed by: NURSE PRACTITIONER

## 2025-07-10 PROCEDURE — G8417 CALC BMI ABV UP PARAM F/U: HCPCS | Performed by: NURSE PRACTITIONER

## 2025-07-10 PROCEDURE — G8399 PT W/DXA RESULTS DOCUMENT: HCPCS | Performed by: NURSE PRACTITIONER

## 2025-07-10 PROCEDURE — 1036F TOBACCO NON-USER: CPT | Performed by: NURSE PRACTITIONER

## 2025-07-10 RX ORDER — MELOXICAM 7.5 MG/1
7.5 TABLET ORAL DAILY
Qty: 30 TABLET | Refills: 1 | Status: SHIPPED | OUTPATIENT
Start: 2025-07-10

## 2025-07-10 NOTE — PROGRESS NOTES
NECK: Supple. No masses. No lymphadenopathy.   CARDIOVASCULAR: Regular rate and rhythm.   PULMONARY: Lungs are clear to auscultation bilaterally.   ABDOMEN: Soft, nontender, nondistended. Positive bowel sounds.   MUSCULOSKELETAL: Strength 5/5 bilaterally in all extremities. +tenderness to right medial joint line  NEUROLOGIC: Cranial nerves II through XII grossly intact. No focal deficits are noted.      ASSESSMENT & PLAN  1. Acute pain of right knee  Will treat conservatively for now  Discussed ortho referral and or PT  Pt wants to hold off for now  - diclofenac sodium (VOLTAREN) 1 % GEL; Apply 4 g topically 4 times daily  Dispense: 50 g; Refill: 1  - meloxicam (MOBIC) 7.5 MG tablet; Take 1 tablet by mouth daily  Dispense: 30 tablet; Refill: 1      Return in about 3 weeks (around 7/31/2025) for right knee pain.        All copied or forwarded information in the progress note was verified by me to be accurate at the time of visit  Patient's past medical, surgical, social and family history were reviewed and updated     All patient questions answered.  Patient voiced understanding.     The patient (or guardian, if applicable) and other individuals in attendance with the patient were advised that Artificial Intelligence will be utilized during this visit to record, process the conversation to generate a clinical note, and support improvement of the AI technology. The patient (or guardian, if applicable) and other individuals in attendance at the appointment consented to the use of AI, including the recording.

## 2025-07-11 DIAGNOSIS — I10 HTN, GOAL BELOW 140/90: ICD-10-CM

## 2025-07-11 RX ORDER — LOSARTAN POTASSIUM 100 MG/1
100 TABLET ORAL DAILY
Qty: 90 TABLET | Refills: 3 | Status: SHIPPED | OUTPATIENT
Start: 2025-07-11

## 2025-07-11 NOTE — TELEPHONE ENCOUNTER
Recent Visits  Date Type Provider Dept   07/10/25 Office Visit Paula Hawkins, APRN - CNP [facility]   05/30/25 Office Visit Sayra Cottrell, APRN - CNP [facility]   05/19/25 Office Visit Karlos Eric, APRN - CNP [facility]   04/29/25 Office Visit Sayra Cottrell, APRN - CNP [facility]   03/05/25 Office Visit Sayra Cottrell, APRN - CNP [facility]   10/23/24 Office Visit Sayra Cottrell, APRN - CNP [facility]   06/20/24 Office Visit Sayra Cottrell, APRN - CNP [facility]   04/23/24 Office Visit Sayra Cottrell, APRN - CNP [facility]   03/12/24 Office Visit Sayra Cottrell, APRN - CNP [facility]   02/20/24 Office Visit aSyra Cottrell, APRN - CNP [facility]   Showing recent visits within past 540 days with a meds authorizing provider and meeting all other requirements  Future Appointments  Date Type Provider Dept   07/29/25 Appointment Sayra Cottrell, APRN - CNP [facility]   10/30/25 Appointment Sayra Cottrell, APRN - CNP [facility]   Showing future appointments within next 150 days with a meds authorizing provider and meeting all other requirements

## 2025-07-29 ENCOUNTER — OFFICE VISIT (OUTPATIENT)
Dept: FAMILY MEDICINE CLINIC | Age: 80
End: 2025-07-29
Payer: MEDICARE

## 2025-07-29 VITALS
HEIGHT: 61 IN | DIASTOLIC BLOOD PRESSURE: 76 MMHG | WEIGHT: 240.4 LBS | RESPIRATION RATE: 16 BRPM | TEMPERATURE: 97.6 F | SYSTOLIC BLOOD PRESSURE: 138 MMHG | OXYGEN SATURATION: 95 % | BODY MASS INDEX: 45.39 KG/M2 | HEART RATE: 76 BPM

## 2025-07-29 DIAGNOSIS — M25.561 ACUTE PAIN OF RIGHT KNEE: Primary | ICD-10-CM

## 2025-07-29 PROCEDURE — 1159F MED LIST DOCD IN RCRD: CPT | Performed by: NURSE PRACTITIONER

## 2025-07-29 PROCEDURE — 3078F DIAST BP <80 MM HG: CPT | Performed by: NURSE PRACTITIONER

## 2025-07-29 PROCEDURE — G8399 PT W/DXA RESULTS DOCUMENT: HCPCS | Performed by: NURSE PRACTITIONER

## 2025-07-29 PROCEDURE — G8417 CALC BMI ABV UP PARAM F/U: HCPCS | Performed by: NURSE PRACTITIONER

## 2025-07-29 PROCEDURE — 1123F ACP DISCUSS/DSCN MKR DOCD: CPT | Performed by: NURSE PRACTITIONER

## 2025-07-29 PROCEDURE — 1036F TOBACCO NON-USER: CPT | Performed by: NURSE PRACTITIONER

## 2025-07-29 PROCEDURE — 3075F SYST BP GE 130 - 139MM HG: CPT | Performed by: NURSE PRACTITIONER

## 2025-07-29 PROCEDURE — G8427 DOCREV CUR MEDS BY ELIG CLIN: HCPCS | Performed by: NURSE PRACTITIONER

## 2025-07-29 PROCEDURE — 1090F PRES/ABSN URINE INCON ASSESS: CPT | Performed by: NURSE PRACTITIONER

## 2025-07-29 PROCEDURE — 99213 OFFICE O/P EST LOW 20 MIN: CPT | Performed by: NURSE PRACTITIONER

## 2025-07-29 NOTE — PROGRESS NOTES
Dyana Mancera is a 80 y.o. female thatpresents for Knee Pain (Right)      History obtained today from Patient and .    History of Present Illness  The patient presents for a follow-up on her knee pain.    She reports that the Voltaren gel provides some relief, but the pain persists. An x-ray revealed significant arthritis in her knee. She sought emergency care on Sunday night due to severe pain that rendered her unable to walk. The emergency team performed an x-ray but did not provide any further treatment. She has been using a cane for mobility at home due to the combined pain from her hip and knee. She experiences stiffness when attempting to straighten her leg after sitting with it propped up. She avoids stairs and has a platform installed at her home to assist with mobility. She has tried various treatments, including a knee brace, ice, and Advil, but these have not provided significant relief. She has previously undergone physical therapy and has used a wrap with Velcro, but discontinued its use due to irritation. She has also tried steroids, which provided only temporary relief. She continues to use ibuprofen sparingly and finds that movement, [place], helps alleviate her symptoms. She has a history of side effects with meloxicam.    Additionally, she reports sudden onset of hip pain, which she believes may be related to her altered gait due to the knee injury. She has been experiencing increased pain in both hips over the past week and has been using a cane more frequently at home.    Living Condition: She has a platform installed at her home to assist with mobility.          I have reviewed the patient's past medical history, past surgical history, allergies,medications, social and family history and I have made updates where appropriate.    Past Medical History:   Diagnosis Date    Abnormal ECG 8/24/2023    Acid reflux     Arthritis     Back pain     Breast cancer

## 2025-08-12 ENCOUNTER — TELEPHONE (OUTPATIENT)
Dept: FAMILY MEDICINE CLINIC | Age: 80
End: 2025-08-12